# Patient Record
Sex: FEMALE | Race: BLACK OR AFRICAN AMERICAN | NOT HISPANIC OR LATINO | Employment: UNEMPLOYED | URBAN - METROPOLITAN AREA
[De-identification: names, ages, dates, MRNs, and addresses within clinical notes are randomized per-mention and may not be internally consistent; named-entity substitution may affect disease eponyms.]

---

## 2017-01-24 ENCOUNTER — HOSPITAL ENCOUNTER (EMERGENCY)
Facility: MEDICAL CENTER | Age: 57
End: 2017-01-24
Attending: EMERGENCY MEDICINE
Payer: MEDICAID

## 2017-01-24 ENCOUNTER — APPOINTMENT (OUTPATIENT)
Dept: RADIOLOGY | Facility: MEDICAL CENTER | Age: 57
End: 2017-01-24
Attending: EMERGENCY MEDICINE
Payer: MEDICAID

## 2017-01-24 VITALS
OXYGEN SATURATION: 100 % | BODY MASS INDEX: 28.81 KG/M2 | HEIGHT: 66 IN | TEMPERATURE: 97.5 F | WEIGHT: 179.23 LBS | HEART RATE: 60 BPM | SYSTOLIC BLOOD PRESSURE: 159 MMHG | RESPIRATION RATE: 20 BRPM | DIASTOLIC BLOOD PRESSURE: 75 MMHG

## 2017-01-24 DIAGNOSIS — F17.200 SMOKER: ICD-10-CM

## 2017-01-24 DIAGNOSIS — Z76.0 MEDICATION REFILL: ICD-10-CM

## 2017-01-24 DIAGNOSIS — I10 ESSENTIAL HYPERTENSION: ICD-10-CM

## 2017-01-24 LAB
ALBUMIN SERPL BCP-MCNC: 4.4 G/DL (ref 3.2–4.9)
ALBUMIN/GLOB SERPL: 1.4 G/DL
ALP SERPL-CCNC: 86 U/L (ref 30–99)
ALT SERPL-CCNC: 15 U/L (ref 2–50)
ANION GAP SERPL CALC-SCNC: 8 MMOL/L (ref 0–11.9)
APTT PPP: 31.7 SEC (ref 24.7–36)
AST SERPL-CCNC: 19 U/L (ref 12–45)
BASOPHILS # BLD AUTO: 0.4 % (ref 0–1.8)
BASOPHILS # BLD: 0.03 K/UL (ref 0–0.12)
BILIRUB SERPL-MCNC: 0.6 MG/DL (ref 0.1–1.5)
BNP SERPL-MCNC: 16 PG/ML (ref 0–100)
BUN SERPL-MCNC: 15 MG/DL (ref 8–22)
CALCIUM SERPL-MCNC: 9.6 MG/DL (ref 8.5–10.5)
CHLORIDE SERPL-SCNC: 110 MMOL/L (ref 96–112)
CO2 SERPL-SCNC: 22 MMOL/L (ref 20–33)
CREAT SERPL-MCNC: 0.9 MG/DL (ref 0.5–1.4)
EKG IMPRESSION: NORMAL
EOSINOPHIL # BLD AUTO: 0.22 K/UL (ref 0–0.51)
EOSINOPHIL NFR BLD: 2.8 % (ref 0–6.9)
ERYTHROCYTE [DISTWIDTH] IN BLOOD BY AUTOMATED COUNT: 42 FL (ref 35.9–50)
GFR SERPL CREATININE-BSD FRML MDRD: >60 ML/MIN/1.73 M 2
GLOBULIN SER CALC-MCNC: 3.1 G/DL (ref 1.9–3.5)
GLUCOSE SERPL-MCNC: 95 MG/DL (ref 65–99)
HCT VFR BLD AUTO: 43.9 % (ref 37–47)
HGB BLD-MCNC: 14.5 G/DL (ref 12–16)
IMM GRANULOCYTES # BLD AUTO: 0.02 K/UL (ref 0–0.11)
IMM GRANULOCYTES NFR BLD AUTO: 0.3 % (ref 0–0.9)
INR PPP: 0.97 (ref 0.87–1.13)
LIPASE SERPL-CCNC: 18 U/L (ref 11–82)
LYMPHOCYTES # BLD AUTO: 2.5 K/UL (ref 1–4.8)
LYMPHOCYTES NFR BLD: 31.3 % (ref 22–41)
MCH RBC QN AUTO: 30.4 PG (ref 27–33)
MCHC RBC AUTO-ENTMCNC: 33 G/DL (ref 33.6–35)
MCV RBC AUTO: 92 FL (ref 81.4–97.8)
MONOCYTES # BLD AUTO: 0.46 K/UL (ref 0–0.85)
MONOCYTES NFR BLD AUTO: 5.8 % (ref 0–13.4)
NEUTROPHILS # BLD AUTO: 4.75 K/UL (ref 2–7.15)
NEUTROPHILS NFR BLD: 59.4 % (ref 44–72)
NRBC # BLD AUTO: 0 K/UL
NRBC BLD AUTO-RTO: 0 /100 WBC
PLATELET # BLD AUTO: 213 K/UL (ref 164–446)
PMV BLD AUTO: 9.8 FL (ref 9–12.9)
POTASSIUM SERPL-SCNC: 4.3 MMOL/L (ref 3.6–5.5)
PROT SERPL-MCNC: 7.5 G/DL (ref 6–8.2)
PROTHROMBIN TIME: 13.2 SEC (ref 12–14.6)
RBC # BLD AUTO: 4.77 M/UL (ref 4.2–5.4)
SODIUM SERPL-SCNC: 140 MMOL/L (ref 135–145)
TROPONIN I SERPL-MCNC: <0.01 NG/ML (ref 0–0.04)
WBC # BLD AUTO: 8 K/UL (ref 4.8–10.8)

## 2017-01-24 PROCEDURE — 83880 ASSAY OF NATRIURETIC PEPTIDE: CPT

## 2017-01-24 PROCEDURE — 700102 HCHG RX REV CODE 250 W/ 637 OVERRIDE(OP): Performed by: EMERGENCY MEDICINE

## 2017-01-24 PROCEDURE — 71010 DX-CHEST-PORTABLE (1 VIEW): CPT

## 2017-01-24 PROCEDURE — 85730 THROMBOPLASTIN TIME PARTIAL: CPT

## 2017-01-24 PROCEDURE — 93005 ELECTROCARDIOGRAM TRACING: CPT

## 2017-01-24 PROCEDURE — 80053 COMPREHEN METABOLIC PANEL: CPT

## 2017-01-24 PROCEDURE — 36415 COLL VENOUS BLD VENIPUNCTURE: CPT

## 2017-01-24 PROCEDURE — 83690 ASSAY OF LIPASE: CPT

## 2017-01-24 PROCEDURE — 85610 PROTHROMBIN TIME: CPT

## 2017-01-24 PROCEDURE — 84484 ASSAY OF TROPONIN QUANT: CPT

## 2017-01-24 PROCEDURE — 85025 COMPLETE CBC W/AUTO DIFF WBC: CPT

## 2017-01-24 PROCEDURE — 99284 EMERGENCY DEPT VISIT MOD MDM: CPT

## 2017-01-24 PROCEDURE — A9270 NON-COVERED ITEM OR SERVICE: HCPCS | Performed by: EMERGENCY MEDICINE

## 2017-01-24 RX ORDER — BUDESONIDE AND FORMOTEROL FUMARATE DIHYDRATE 160; 4.5 UG/1; UG/1
2 AEROSOL RESPIRATORY (INHALATION) 2 TIMES DAILY
Qty: 1 INHALER | Refills: 0 | Status: SHIPPED | OUTPATIENT
Start: 2017-01-24 | End: 2017-03-23 | Stop reason: SDUPTHER

## 2017-01-24 RX ORDER — TRIAMTERENE AND HYDROCHLOROTHIAZIDE 37.5; 25 MG/1; MG/1
1 TABLET ORAL EVERY MORNING
Qty: 30 TAB | Refills: 0 | Status: SHIPPED | OUTPATIENT
Start: 2017-01-24 | End: 2017-03-23 | Stop reason: SDUPTHER

## 2017-01-24 RX ORDER — ASPIRIN 81 MG/1
324 TABLET, CHEWABLE ORAL ONCE
Status: COMPLETED | OUTPATIENT
Start: 2017-01-24 | End: 2017-01-24

## 2017-01-24 RX ADMIN — ASPIRIN 324 MG: 81 TABLET, CHEWABLE ORAL at 13:50

## 2017-01-24 ASSESSMENT — ENCOUNTER SYMPTOMS
COUGH: 0
DIZZINESS: 1
SHORTNESS OF BREATH: 0
FEVER: 0
TINGLING: 1

## 2017-01-24 ASSESSMENT — PAIN SCALES - GENERAL: PAINLEVEL_OUTOF10: 4

## 2017-01-24 NOTE — ED AVS SNAPSHOT
1/24/2017          Anali Tellez  [address]  [facility] [address]    Dear Anali:    [facility] wants to ensure your discharge home is safe and you or your loved ones have had all your questions answered regarding your care after you leave the hospital.    You may receive a telephone call within two days of your discharge.  This call is to make certain you understand your discharge instructions as well as ensure we provided you with the best care possible during your stay with us.     The call will only last approximately 3-5 minutes and will be done by a nurse.    Once again, we want to ensure your discharge home is safe and that you have a clear understanding of any next steps in your care.  If you have any questions or concerns, please do not hesitate to contact us, we are here for you.  Thank you for choosing [facility] for your healthcare needs.    Sincerely,    Qasim Chanel    [facility]

## 2017-01-24 NOTE — ED NOTES
Verbalizes understanding of discharge and followup instructions. PIV removed, VSS. Given Rx's x2. Ambulates with steady gait to discharge.

## 2017-01-24 NOTE — DISCHARGE INSTRUCTIONS
Arterial Hypertension  Arterial hypertension (high blood pressure) is a condition of elevated pressure in your blood vessels. Hypertension over a long period of time is a risk factor for strokes, heart attacks, and heart failure. It is also the leading cause of kidney (renal) failure.   CAUSES   · In Adults -- Over 90% of all hypertension has no known cause. This is called essential or primary hypertension. In the other 10% of people with hypertension, the increase in blood pressure is caused by another disorder. This is called secondary hypertension. Important causes of secondary hypertension are:  · Heavy alcohol use.  · Obstructive sleep apnea.  · Hyperaldosterosim (Conn's syndrome).  · Steroid use.  · Chronic kidney failure.  · Hyperparathyroidism.  · Medications.  · Renal artery stenosis.  · Pheochromocytoma.  · Cushing's disease.  · Coarctation of the aorta.  · Scleroderma renal crisis.  · Licorice (in excessive amounts).  · Drugs (cocaine, methamphetamine).  Your caregiver can explain any items above that apply to you.  · In Children -- Secondary hypertension is more common and should always be considered.  · Pregnancy -- Few women of childbearing age have high blood pressure. However, up to 10% of them develop hypertension of pregnancy. Generally, this will not harm the woman. It may be a sign of 3 complications of pregnancy: preeclampsia, HELLP syndrome, and eclampsia. Follow up and control with medication is necessary.  SYMPTOMS   · This condition normally does not produce any noticeable symptoms. It is usually found during a routine exam.  · Malignant hypertension is a late problem of high blood pressure. It may have the following symptoms:  · Headaches.  · Blurred vision.  · End-organ damage (this means your kidneys, heart, lungs, and other organs are being damaged).  · Stressful situations can increase the blood pressure. If a person with normal blood pressure has their blood pressure go up while being  "seen by their caregiver, this is often termed \"white coat hypertension.\" Its importance is not known. It may be related with eventually developing hypertension or complications of hypertension.  · Hypertension is often confused with mental tension, stress, and anxiety.  DIAGNOSIS   The diagnosis is made by 3 separate blood pressure measurements. They are taken at least 1 week apart from each other. If there is organ damage from hypertension, the diagnosis may be made without repeat measurements.  Hypertension is usually identified by having blood pressure readings:  · Above 140/90 mmHg measured in both arms, at 3 separate times, over a couple weeks.  · Over 130/80 mmHg should be considered a risk factor and may require treatment in patients with diabetes.  Blood pressure readings over 120/80 mmHg are called \"pre-hypertension\" even in non-diabetic patients.  To get a true blood pressure measurement, use the following guidelines. Be aware of the factors that can alter blood pressure readings.  · Take measurements at least 1 hour after caffeine.  · Take measurements 30 minutes after smoking and without any stress. This is another reason to quit smoking  it raises your blood pressure.  · Use a proper cuff size. Ask your caregiver if you are not sure about your cuff size.  · Most home blood pressure cuffs are automatic. They will measure systolic and diastolic pressures. The systolic pressure is the pressure reading at the start of sounds. Diastolic pressure is the pressure at which the sounds disappear. If you are elderly, measure pressures in multiple postures. Try sitting, lying or standing.  · Sit at rest for a minimum of 5 minutes before taking measurements.  · You should not be on any medications like decongestants. These are found in many cold medications.  · Record your blood pressure readings and review them with your caregiver.  If you have hypertension:  · Your caregiver may do tests to be sure you do not have " "secondary hypertension (see \"causes\" above).  · Your caregiver may also look for signs of metabolic syndrome. This is also called Syndrome X or Insulin Resistance Syndrome. You may have this syndrome if you have type 2 diabetes, abdominal obesity, and abnormal blood lipids in addition to hypertension.  · Your caregiver will take your medical and family history and perform a physical exam.  · Diagnostic tests may include blood tests (for glucose, cholesterol, potassium, and kidney function), a urinalysis, or an EKG. Other tests may also be necessary depending on your condition.  PREVENTION   There are important lifestyle issues that you can adopt to reduce your chance of developing hypertension:  · Maintain a normal weight.  · Limit the amount of salt (sodium) in your diet.  · Exercise often.  · Limit alcohol intake.  · Get enough potassium in your diet. Discuss specific advice with your caregiver.  · Follow a DASH diet (dietary approaches to stop hypertension). This diet is rich in fruits, vegetables, and low-fat dairy products, and avoids certain fats.  PROGNOSIS   Essential hypertension cannot be cured. Lifestyle changes and medical treatment can lower blood pressure and reduce complications. The prognosis of secondary hypertension depends on the underlying cause. Many people whose hypertension is controlled with medicine or lifestyle changes can live a normal, healthy life.   RISKS AND COMPLICATIONS   While high blood pressure alone is not an illness, it often requires treatment due to its short- and long-term effects on many organs. Hypertension increases your risk for:  · CVAs or strokes (cerebrovascular accident).  · Heart failure due to chronically high blood pressure (hypertensive cardiomyopathy).  · Heart attack (myocardial infarction).  · Damage to the retina (hypertensive retinopathy).  · Kidney failure (hypertensive nephropathy).  Your caregiver can explain list items above that apply to you. Treatment " "of hypertension can significantly reduce the risk of complications.  TREATMENT   · For overweight patients, weight loss and regular exercise are recommended. Physical fitness lowers blood pressure.  · Mild hypertension is usually treated with diet and exercise. A diet rich in fruits and vegetables, fat-free dairy products, and foods low in fat and salt (sodium) can help lower blood pressure. Decreasing salt intake decreases blood pressure in a 1/3 of people.  · Stop smoking if you are a smoker.  The steps above are highly effective in reducing blood pressure. While these actions are easy to suggest, they are difficult to achieve. Most patients with moderate or severe hypertension end up requiring medications to bring their blood pressure down to a normal level. There are several classes of medications for treatment. Blood pressure pills (antihypertensives) will lower blood pressure by their different actions. Lowering the blood pressure by 10 mmHg may decrease the risk of complications by as much as 25%.  The goal of treatment is effective blood pressure control. This will reduce your risk for complications. Your caregiver will help you determine the best treatment for you according to your lifestyle. What is excellent treatment for one person, may not be for you.  HOME CARE INSTRUCTIONS   · Do not smoke.  · Follow the lifestyle changes outlined in the \"Prevention\" section.  · If you are on medications, follow the directions carefully. Blood pressure medications must be taken as prescribed. Skipping doses reduces their benefit. It also puts you at risk for problems.  · Follow up with your caregiver, as directed.  · If you are asked to monitor your blood pressure at home, follow the guidelines in the \"Diagnosis\" section above.  SEEK MEDICAL CARE IF:   · You think you are having medication side effects.  · You have recurrent headaches or lightheadedness.  · You have swelling in your ankles.  · You have trouble with " your vision.  SEEK IMMEDIATE MEDICAL CARE IF:   · You have sudden onset of chest pain or pressure, difficulty breathing, or other symptoms of a heart attack.  · You have a severe headache.  · You have symptoms of a stroke (such as sudden weakness, difficulty speaking, difficulty walking).  MAKE SURE YOU:   · Understand these instructions.  · Will watch your condition.  · Will get help right away if you are not doing well or get worse.  Document Released: 12/18/2006 Document Revised: 03/11/2013 Document Reviewed: 07/17/2008  [employer]® Patient Information ©2014 [employer].  Chest Pain, Nonspecific  It is often hard to give a specific diagnosis for the cause of chest pain. There is always a chance that your pain could be related to something serious, like a heart attack or a blood clot in the lungs. You need to follow up with your caregiver for further evaluation. More lab tests or other studies such as X-rays, electrocardiography, stress testing, or cardiac imaging may be needed to find the cause of your pain.  Most of the time, nonspecific chest pain improves within 2 to 3 days with rest and mild pain medicine. For the next few days, avoid physical exertion or activities that bring on pain. Do not smoke. Avoid drinking alcohol. Call your caregiver for routine follow-up as advised.   SEEK IMMEDIATE MEDICAL CARE IF:  · You develop increased chest pain or pain that radiates to the arm, neck, jaw, back, or abdomen.   · You develop shortness of breath, increased coughing, or you start coughing up blood.   · You have severe back or abdominal pain, nausea, or vomiting.   · You develop severe weakness, fainting, fever, or chills.   Document Released: 12/18/2006 Document Revised: 03/11/2013 Document Reviewed: 06/06/2008  [employer]® Patient Information ©2013 [employer].    Smoking Cessation, Tips for Success  If you are ready to quit smoking, congratulations! You have chosen to help yourself be healthier. Cigarettes bring  "nicotine, tar, carbon monoxide, and other irritants into your body. Your lungs, heart, and blood vessels will be able to work better without these poisons. There are many different ways to quit smoking. Nicotine gum, nicotine patches, a nicotine inhaler, or nicotine nasal spray can help with physical craving. Hypnosis, support groups, and medicines help break the habit of smoking.  WHAT THINGS CAN I DO TO MAKE QUITTING EASIER?   Here are some tips to help you quit for good:  · Pick a date when you will quit smoking completely. Tell all of your friends and family about your plan to quit on that date.  · Do not try to slowly cut down on the number of cigarettes you are smoking. Pick a quit date and quit smoking completely starting on that day.  · Throw away all cigarettes.    · Clean and remove all ashtrays from your home, work, and car.  · On a card, write down your reasons for quitting. Carry the card with you and read it when you get the urge to smoke.  · Cleanse your body of nicotine. Drink enough water and fluids to keep your urine clear or pale yellow. Do this after quitting to flush the nicotine from your body.  · Learn to predict your moods. Do not let a bad situation be your excuse to have a cigarette. Some situations in your life might tempt you into wanting a cigarette.  · Never have \"just one\" cigarette. It leads to wanting another and another. Remind yourself of your decision to quit.  · Change habits associated with smoking. If you smoked while driving or when feeling stressed, try other activities to replace smoking. Stand up when drinking your coffee. Brush your teeth after eating. Sit in a different chair when you read the paper. Avoid alcohol while trying to quit, and try to drink fewer caffeinated beverages. Alcohol and caffeine may urge you to smoke.  · Avoid foods and drinks that can trigger a desire to smoke, such as sugary or spicy foods and alcohol.  · Ask people who smoke not to smoke around " "you.  · Have something planned to do right after eating or having a cup of coffee. For example, plan to take a walk or exercise.  · Try a relaxation exercise to calm you down and decrease your stress. Remember, you may be tense and nervous for the first 2 weeks after you quit, but this will pass.  · Find new activities to keep your hands busy. Play with a pen, coin, or rubber band. Doodle or draw things on paper.  · Brush your teeth right after eating. This will help cut down on the craving for the taste of tobacco after meals. You can also try mouthwash.    · Use oral substitutes in place of cigarettes. Try using lemon drops, carrots, cinnamon sticks, or chewing gum. Keep them handy so they are available when you have the urge to smoke.  · When you have the urge to smoke, try deep breathing.  · Designate your home as a nonsmoking area.  · If you are a heavy smoker, ask your health care provider about a prescription for nicotine chewing gum. It can ease your withdrawal from nicotine.  · Reward yourself. Set aside the cigarette money you save and buy yourself something nice.  · Look for support from others. Join a support group or smoking cessation program. Ask someone at home or at work to help you with your plan to quit smoking.  · Always ask yourself, \"Do I need this cigarette or is this just a reflex?\" Tell yourself, \"Today, I choose not to smoke,\" or \"I do not want to smoke.\" You are reminding yourself of your decision to quit.  · Do not replace cigarette smoking with electronic cigarettes (commonly called e-cigarettes). The safety of e-cigarettes is unknown, and some may contain harmful chemicals.  · If you relapse, do not give up! Plan ahead and think about what you will do the next time you get the urge to smoke.  HOW WILL I FEEL WHEN I QUIT SMOKING?  You may have symptoms of withdrawal because your body is used to nicotine (the addictive substance in cigarettes). You may crave cigarettes, be irritable, feel " very hungry, cough often, get headaches, or have difficulty concentrating. The withdrawal symptoms are only temporary. They are strongest when you first quit but will go away within 10-14 days. When withdrawal symptoms occur, stay in control. Think about your reasons for quitting. Remind yourself that these are signs that your body is healing and getting used to being without cigarettes. Remember that withdrawal symptoms are easier to treat than the major diseases that smoking can cause.   Even after the withdrawal is over, expect periodic urges to smoke. However, these cravings are generally short lived and will go away whether you smoke or not. Do not smoke!  WHAT RESOURCES ARE AVAILABLE TO HELP ME QUIT SMOKING?  Your health care provider can direct you to community resources or hospitals for support, which may include:  · Group support.  · Education.  · Hypnosis.  · Therapy.     This information is not intended to replace advice given to you by your health care provider. Make sure you discuss any questions you have with your health care provider.     Document Released: 09/15/2005 Document Revised: 01/08/2016 Document Reviewed: 06/05/2014  [employer] Interactive Patient Education ©2016 [employer]

## 2017-01-24 NOTE — ED NOTES
Pt ambulated to yellow 65.   A/ox4, has been off her bp medication for 3wks. C/o on/off chest pain and tingling left fingers.   No other deficit noted.   Changed to gown and placed monitor

## 2017-01-24 NOTE — ED PROVIDER NOTES
ED Provider Note    Scribed for Francis Cochran M.D. by Tolu Maradiaga. 1/24/2017, 1:29 PM.    Means of arrival: Walk in   History obtained from: Patient  History limited by: None    CHIEF COMPLAINT  Chief Complaint   Patient presents with   • Hypertension   • Medication Refill     off htn meds x 3 weeks.   • Tingling     left arm hand onset last night        HPI  Anali Tellez is a 56 y.o. female who presents to the Emergency Department complaining of hypertension, medication refill, tingling. Patient reports she has been off of her blood pressure medications for the past 3 weeks due to difficulty finding primary care provider and insurance issues that she is currently working out. She says she was taking Triamterene-HCTZ. She requests a medications refill. She says she came to the ED because of symptoms of chest pain, dizziness, and tingling to her left arm and hand onset last night around 10:00 PM or 11:00 PM. Currently, she has mild chest pain. Her chest pain is not exacerbated with activity. She denies any fever, shortness of breath, cough. Patient is a current smoker (Quarter pack per day). She has a past medical history of hypertension, pneumonia, and COPD.     REVIEW OF SYSTEMS  Review of Systems   Constitutional: Negative for fever.   Respiratory: Negative for cough and shortness of breath.    Cardiovascular: Positive for chest pain.   Neurological: Positive for dizziness and tingling.   All other systems reviewed and are negative.      PAST MEDICAL HISTORY   has a past medical history of Hypertension; Pneumonia; and Chronic obstructive pulmonary disease (CMS-HCC).    SURGICAL HISTORY  patient denies any surgical history    SOCIAL HISTORY  Social History   Substance Use Topics   • Smoking status: Current Every Day Smoker   • Smokeless tobacco: None   • Alcohol Use: Yes      Comment: occ      History   Drug Use No       FAMILY HISTORY  History reviewed. No pertinent family history.    CURRENT  "MEDICATIONS  Home Medications     Reviewed by Laila Johnson R.N. (Registered Nurse) on 01/24/17 at 1309  Med List Status: Complete    Medication Last Dose Status    albuterol (VENTOLIN OR PROVENTIL) 108 (90 BASE) MCG/ACT Aero Soln inhalation aerosol 1/23/2017 Active    albuterol 108 (90 BASE) MCG/ACT Aero Soln inhalation aerosol 1/23/2017 Active    budesonide-formoterol (SYMBICORT) 160-4.5 MCG/ACT Aerosol 1/23/2017 Active    triamterene-hctz (MAXZIDE-25/DYAZIDE) 37.5-25 MG Tab >3wks Active                ALLERGIES  No Known Allergies    PHYSICAL EXAM  VITAL SIGNS: /75 mmHg  Pulse 66  Temp(Src) 36.4 °C (97.5 °F)  Resp 18  Ht 1.676 m (5' 6\")  Wt 81.3 kg (179 lb 3.7 oz)  BMI 28.94 kg/m2  SpO2 99%  LMP 05/19/2010    Constitutional: Well developed, Well nourished, No acute distress, Non-toxic appearance.   HENT: Normocephalic, Atraumatic, Bilateral external ears normal, oropharynx moist, No oral exudates, Nose normal.   Eyes: Pupils are equal round and react to light, extraocular motions are intact, conjunctiva is normal, there are no signs of exudate.   Neck: Supple, no meningeal signs.  Lymphatic: No lymphadenopathy noted.   Cardiovascular: Regular rate and rhythm without murmurs gallops or rubs.   Thorax & Lungs: No respiratory distress. Breathing comfortably. Lungs are clear to auscultation bilaterally, there are no wheezes no rales. Chest wall is nontender.  Abdomen: Soft, nontender, nondistended. Bowel sounds are present.   Skin: Warm, Dry, No erythema,   Back: No tenderness, No CVA tenderness.  Musculoskeletal: Good range of motion in all major joints. No tenderness to palpation or major deformities noted. Intact distal pulses, no clubbing, no cyanosis, no edema,   Neurologic: Alert & oriented x 3, Moving all extremities. No gross abnormalities.    Psychiatric: Affect normal, Judgment normal, Mood normal.         LABS  Results for orders placed or performed during the hospital encounter of " 01/24/17   CBC WITH DIFFERENTIAL   Result Value Ref Range    WBC 8.0 4.8 - 10.8 K/uL    RBC 4.77 4.20 - 5.40 M/uL    Hemoglobin 14.5 12.0 - 16.0 g/dL    Hematocrit 43.9 37.0 - 47.0 %    MCV 92.0 81.4 - 97.8 fL    MCH 30.4 27.0 - 33.0 pg    MCHC 33.0 (L) 33.6 - 35.0 g/dL    RDW 42.0 35.9 - 50.0 fL    Platelet Count 213 164 - 446 K/uL    MPV 9.8 9.0 - 12.9 fL    Neutrophils-Polys 59.40 44.00 - 72.00 %    Lymphocytes 31.30 22.00 - 41.00 %    Monocytes 5.80 0.00 - 13.40 %    Eosinophils 2.80 0.00 - 6.90 %    Basophils 0.40 0.00 - 1.80 %    Immature Granulocytes 0.30 0.00 - 0.90 %    Nucleated RBC 0.00 /100 WBC    Neutrophils (Absolute) 4.75 2.00 - 7.15 K/uL    Lymphs (Absolute) 2.50 1.00 - 4.80 K/uL    Monos (Absolute) 0.46 0.00 - 0.85 K/uL    Eos (Absolute) 0.22 0.00 - 0.51 K/uL    Baso (Absolute) 0.03 0.00 - 0.12 K/uL    Immature Granulocytes (abs) 0.02 0.00 - 0.11 K/uL    NRBC (Absolute) 0.00 K/uL   COMP METABOLIC PANEL   Result Value Ref Range    Sodium 140 135 - 145 mmol/L    Potassium 4.3 3.6 - 5.5 mmol/L    Chloride 110 96 - 112 mmol/L    Co2 22 20 - 33 mmol/L    Anion Gap 8.0 0.0 - 11.9    Glucose 95 65 - 99 mg/dL    Bun 15 8 - 22 mg/dL    Creatinine 0.90 0.50 - 1.40 mg/dL    Calcium 9.6 8.5 - 10.5 mg/dL    AST(SGOT) 19 12 - 45 U/L    ALT(SGPT) 15 2 - 50 U/L    Alkaline Phosphatase 86 30 - 99 U/L    Total Bilirubin 0.6 0.1 - 1.5 mg/dL    Albumin 4.4 3.2 - 4.9 g/dL    Total Protein 7.5 6.0 - 8.2 g/dL    Globulin 3.1 1.9 - 3.5 g/dL    A-G Ratio 1.4 g/dL   LIPASE   Result Value Ref Range    Lipase 18 11 - 82 U/L   PROTHROMBIN TIME   Result Value Ref Range    PT 13.2 12.0 - 14.6 sec    INR 0.97 0.87 - 1.13   APTT   Result Value Ref Range    APTT 31.7 24.7 - 36.0 sec   TROPONIN   Result Value Ref Range    Troponin I <0.01 0.00 - 0.04 ng/mL   BTYPE NATRIURETIC PEPTIDE   Result Value Ref Range    B Natriuretic Peptide 16 0 - 100 pg/mL   ESTIMATED GFR   Result Value Ref Range    GFR If  >60 >60  mL/min/1.73 m 2    GFR If Non African American >60 >60 mL/min/1.73 m 2     All labs reviewed by me.    EKG  Interpreted by me    Rhythm: normal sinus  Rate: 88 which is normal  Axis: normal at 5   Ectopy: none  Conduction: Signs of left atrial enlargement. Otherwise no acute.    ST Segments: Non-specific depressions in 2, 3, and  aVF. Otherwise no acute.  T Waves: Non-specific T wave flattening throughout. Otherwise no acute.   Q Waves: none    Clinical Impression: Nonspecific EKG improved from prior EKG. No signs of acute MI.       RADIOLOGY  DX-CHEST-PORTABLE (1 VIEW)   Final Result      No evidence of acute cardiopulmonary process.        The radiologist's interpretation of all radiological studies have been reviewed by me.    COURSE & MEDICAL DECISION MAKING  Pertinent Labs & Imaging studies reviewed. (See chart for details)    1:29 PM - Patient seen and examined at bedside. Patient will be treated with ASA 324mg PO. Ordered DX-chest, CBC with differential, CMP, Lipase, Prothrombin time, APTT, Troponin, BNP, EKG to evaluate her symptoms. The differential diagnoses include but are not limited to: Hypertension, acute coronary syndrome. It has been over 16 hours since onset of patient's chest pain.      2:29 PM - Patient re-evaluated at bedside. Patient reports feeling fine. I discussed patient's lab and radiology results noted above that showed to be normal. Discussed patient's refill medications of Triamterene and Symbicort. Patient advised to return to the ED if symptoms worsen and [facility]. Patient understood and agreed.     Francis Cochran M.D. spent approximately 10 minutes with the patient explaining the importance of smoking cessation.     Decision Making:  Patient uses for evaluation. Her clinically, I do not feel is cardiovascular in nature. Her chest discomfort is been going on for greater than 16 hours and constant in nature. EKG troponin are negative. Blood  pressure slightly elevated. I'll start her back, her blood pressure medications and recommended she follow up with primary care physician 1 week for recheck, return as needed.    DISPOSITION:  Patient will be discharged home in stable condition.    FOLLOW UP:  [facility]  [address]  [place] [address]  [phone]          [facility]  [address] [facility] [address]  [phone]  Schedule an appointment as soon as possible for a visit      FINAL IMPRESSION  1. Essential hypertension    2. Medication refill    3. Smoker     3. Smoking cessation discussion with the patient 10 minutes     Tolu WHITE (Scribe), am scribing for, and in the presence of, Francis Cochran M.D..    Electronically signed by: Tolu Maradiaga (Scribe), 1/24/2017    IFrancis M.D. personally performed the services described in this documentation, as scribed by Tolu Maradiaga in my presence, and it is both accurate and complete.    The note accurately reflects work and decisions made by me.  Francis Cochran  1/24/2017  5:38 PM

## 2017-01-24 NOTE — ED NOTES
.  Chief Complaint   Patient presents with   • Hypertension   • Medication Refill     off htn meds x 3 weeks.   • Tingling     left arm hand onset last night      Ambulated to triage above c/c.   Informed of triage process. Awaiting room in triage lobby. Asked to return to triage desk with any questions or concerns.

## 2017-01-24 NOTE — ED AVS SNAPSHOT
365net Access Code: [email or web address]  Expires: 2/2/2017  8:56 AM    365net  A secure, online tool to manage your health information     [employer]’s 365net® is a secure, online tool that connects you to your personalized health information from the privacy of your home -- day or night - making it very easy for you to manage your healthcare. Once the activation process is completed, you can even access your medical information using the 365net anni, which is available for free in the Apple Anni store or Google Play store.     365net provides the following levels of access (as shown below):   My Chart Features   [facility] Primary Care Doctor [facility]  Specialists [facility]  Urgent  Care Non-[facility]  Primary Care  Doctor   Email your healthcare team securely and privately 24/7 X X X X   Manage appointments: schedule your next appointment; view details of past/upcoming appointments X      Request prescription refills. X      View recent personal medical records, including lab and immunizations X X X X   View health record, including health history, allergies, medications X X X X   Read reports about your outpatient visits, procedures, consult and ER notes X X X X   See your discharge summary, which is a recap of your hospital and/or ER visit that includes your diagnosis, lab results, and care plan. X X       How to register for 365net:  1. Go to  [email or web address].  2. Click on the Sign Up Now box, which takes you to the New Member Sign Up page. You will need to provide the following information:  a. Enter your 365net Access Code exactly as it appears at the top of this page. (You will not need to use this code after you’ve completed the sign-up process. If you do not sign up before the expiration date, you must request a new code.)   b. Enter your date of birth.   c. Enter your home email address.   d. Click Submit, and follow the next screen’s instructions.  3. Create a 365net ID. This will be your 365net  login ID and cannot be changed, so think of one that is secure and easy to remember.  4. Create a [employer] password. You can change your password at any time.  5. Enter your Password Reset Question and Answer. This can be used at a later time if you forget your password.   6. Enter your e-mail address. This allows you to receive e-mail notifications when new information is available in [employer].  7. Click Sign Up. You can now view your health information.    For assistance activating your [employer] account, call [phone]

## 2017-01-24 NOTE — ED AVS SNAPSHOT
After Visit Summary                                                                                                                Anlai Tellez   MRN: [id number]    Department:  [facility], Emergency Dept   Date of Visit:  1/24/2017            [facility], Emergency Dept    [address] [address]    Phone:  [phone]      You were seen by     Francis Cochran M.D.      Your Diagnosis Was     Essential hypertension     I10       These are the medications you received during your hospitalization from 01/24/2017 1239 to 01/24/2017 1439     Date/Time Order Dose Route Action    01/24/2017 1350 aspirin (ASA) chewable tab 324 mg 324 mg Oral Given      Follow-up Information     1. Follow up with [facility].    Contact information    [address]  [place] [address] [phone]          2. Schedule an appointment as soon as possible for a visit with [facility].    Contact information    [address] [id number] [phone]      Medication Information     Review all of your home medications and newly ordered medications with your primary doctor and/or pharmacist as soon as possible. Follow medication instructions as directed by your doctor and/or pharmacist.     Please keep your complete medication list with you and share with your physician. Update the information when medications are discontinued, doses are changed, or new medications (including over-the-counter products) are added; and carry medication information at all times in the event of emergency situations.               Medication List      CONTINUE taking these medications        Instructions    budesonide-formoterol 160-4.5 MCG/ACT Aero   Commonly known as:  SYMBICORT    Inhale 2 Puffs by mouth 2 Times a Day.   Dose:  2 Puff       triamterene-hctz 37.5-25 MG Tabs   Commonly known as:  MAXZIDE-25/DYAZIDE    Take 1 Tab by mouth every morning.   Dose:  1 Tab            ASK your doctor about these medications        Instructions    * albuterol 108 (90 BASE) MCG/ACT Aers inhalation aerosol    Inhale 2 Puffs by mouth every four hours as needed for Shortness of Breath.   Dose:  2 Puff       * albuterol 108 (90 BASE) MCG/ACT Aers inhalation aerosol    Inhale 2 Puffs by mouth every 6 hours as needed for Shortness of Breath.   Dose:  2 Puff       * Notice:  This list has 2 medication(s) that are the same as other medications prescribed for you. Read the directions carefully, and ask your doctor or other care provider to review them with you.            Procedures and tests performed during your visit     APTT    BTYPE NATRIURETIC PEPTIDE    CBC WITH DIFFERENTIAL    COMP METABOLIC PANEL    DX-CHEST-PORTABLE (1 VIEW)    EKG (NOW)    ESTIMATED GFR    LIPASE    PROTHROMBIN TIME    TROPONIN        Discharge Instructions       Arterial Hypertension  Arterial hypertension (high blood pressure) is a condition of elevated pressure in your blood vessels. Hypertension over a long period of time is a risk factor for strokes, heart attacks, and heart failure. It is also the leading cause of kidney (renal) failure.   CAUSES   · In Adults -- Over 90% of all hypertension has no known cause. This is called essential or primary hypertension. In the other 10% of people with hypertension, the increase in blood pressure is caused by another disorder. This is called secondary hypertension. Important causes of secondary hypertension are:  · Heavy alcohol use.  · Obstructive sleep apnea.  · Hyperaldosterosim (Conn's syndrome).  · Steroid use.  · Chronic kidney failure.  · Hyperparathyroidism.  · Medications.  · Renal artery stenosis.  · Pheochromocytoma.  · Cushing's disease.  · Coarctation of the aorta.  · Scleroderma renal crisis.  · Licorice (in excessive amounts).  · Drugs (cocaine, methamphetamine).  Your caregiver can explain any items above that apply to you.  · In Children -- Secondary hypertension is  "more common and should always be considered.  · Pregnancy -- Few women of childbearing age have high blood pressure. However, up to 10% of them develop hypertension of pregnancy. Generally, this will not harm the woman. It may be a sign of 3 complications of pregnancy: preeclampsia, HELLP syndrome, and eclampsia. Follow up and control with medication is necessary.  SYMPTOMS   · This condition normally does not produce any noticeable symptoms. It is usually found during a routine exam.  · Malignant hypertension is a late problem of high blood pressure. It may have the following symptoms:  · Headaches.  · Blurred vision.  · End-organ damage (this means your kidneys, heart, lungs, and other organs are being damaged).  · Stressful situations can increase the blood pressure. If a person with normal blood pressure has their blood pressure go up while being seen by their caregiver, this is often termed \"white coat hypertension.\" Its importance is not known. It may be related with eventually developing hypertension or complications of hypertension.  · Hypertension is often confused with mental tension, stress, and anxiety.  DIAGNOSIS   The diagnosis is made by 3 separate blood pressure measurements. They are taken at least 1 week apart from each other. If there is organ damage from hypertension, the diagnosis may be made without repeat measurements.  Hypertension is usually identified by having blood pressure readings:  · Above 140/90 mmHg measured in both arms, at 3 separate times, over a couple weeks.  · Over 130/80 mmHg should be considered a risk factor and may require treatment in patients with diabetes.  Blood pressure readings over 120/80 mmHg are called \"pre-hypertension\" even in non-diabetic patients.  To get a true blood pressure measurement, use the following guidelines. Be aware of the factors that can alter blood pressure readings.  · Take measurements at least 1 hour after caffeine.  · Take measurements 30 " "minutes after smoking and without any stress. This is another reason to quit smoking  it raises your blood pressure.  · Use a proper cuff size. Ask your caregiver if you are not sure about your cuff size.  · Most home blood pressure cuffs are automatic. They will measure systolic and diastolic pressures. The systolic pressure is the pressure reading at the start of sounds. Diastolic pressure is the pressure at which the sounds disappear. If you are elderly, measure pressures in multiple postures. Try sitting, lying or standing.  · Sit at rest for a minimum of 5 minutes before taking measurements.  · You should not be on any medications like decongestants. These are found in many cold medications.  · Record your blood pressure readings and review them with your caregiver.  If you have hypertension:  · Your caregiver may do tests to be sure you do not have secondary hypertension (see \"causes\" above).  · Your caregiver may also look for signs of metabolic syndrome. This is also called Syndrome X or Insulin Resistance Syndrome. You may have this syndrome if you have type 2 diabetes, abdominal obesity, and abnormal blood lipids in addition to hypertension.  · Your caregiver will take your medical and family history and perform a physical exam.  · Diagnostic tests may include blood tests (for glucose, cholesterol, potassium, and kidney function), a urinalysis, or an EKG. Other tests may also be necessary depending on your condition.  PREVENTION   There are important lifestyle issues that you can adopt to reduce your chance of developing hypertension:  · Maintain a normal weight.  · Limit the amount of salt (sodium) in your diet.  · Exercise often.  · Limit alcohol intake.  · Get enough potassium in your diet. Discuss specific advice with your caregiver.  · Follow a DASH diet (dietary approaches to stop hypertension). This diet is rich in fruits, vegetables, and low-fat dairy products, and avoids certain fats.  PROGNOSIS   "   Essential hypertension cannot be cured. Lifestyle changes and medical treatment can lower blood pressure and reduce complications. The prognosis of secondary hypertension depends on the underlying cause. Many people whose hypertension is controlled with medicine or lifestyle changes can live a normal, healthy life.   RISKS AND COMPLICATIONS   While high blood pressure alone is not an illness, it often requires treatment due to its short- and long-term effects on many organs. Hypertension increases your risk for:  · CVAs or strokes (cerebrovascular accident).  · Heart failure due to chronically high blood pressure (hypertensive cardiomyopathy).  · Heart attack (myocardial infarction).  · Damage to the retina (hypertensive retinopathy).  · Kidney failure (hypertensive nephropathy).  Your caregiver can explain list items above that apply to you. Treatment of hypertension can significantly reduce the risk of complications.  TREATMENT   · For overweight patients, weight loss and regular exercise are recommended. Physical fitness lowers blood pressure.  · Mild hypertension is usually treated with diet and exercise. A diet rich in fruits and vegetables, fat-free dairy products, and foods low in fat and salt (sodium) can help lower blood pressure. Decreasing salt intake decreases blood pressure in a 1/3 of people.  · Stop smoking if you are a smoker.  The steps above are highly effective in reducing blood pressure. While these actions are easy to suggest, they are difficult to achieve. Most patients with moderate or severe hypertension end up requiring medications to bring their blood pressure down to a normal level. There are several classes of medications for treatment. Blood pressure pills (antihypertensives) will lower blood pressure by their different actions. Lowering the blood pressure by 10 mmHg may decrease the risk of complications by as much as 25%.  The goal of treatment is effective blood pressure control.  "This will reduce your risk for complications. Your caregiver will help you determine the best treatment for you according to your lifestyle. What is excellent treatment for one person, may not be for you.  HOME CARE INSTRUCTIONS   · Do not smoke.  · Follow the lifestyle changes outlined in the \"Prevention\" section.  · If you are on medications, follow the directions carefully. Blood pressure medications must be taken as prescribed. Skipping doses reduces their benefit. It also puts you at risk for problems.  · Follow up with your caregiver, as directed.  · If you are asked to monitor your blood pressure at home, follow the guidelines in the \"Diagnosis\" section above.  SEEK MEDICAL CARE IF:   · You think you are having medication side effects.  · You have recurrent headaches or lightheadedness.  · You have swelling in your ankles.  · You have trouble with your vision.  SEEK IMMEDIATE MEDICAL CARE IF:   · You have sudden onset of chest pain or pressure, difficulty breathing, or other symptoms of a heart attack.  · You have a severe headache.  · You have symptoms of a stroke (such as sudden weakness, difficulty speaking, difficulty walking).  MAKE SURE YOU:   · Understand these instructions.  · Will watch your condition.  · Will get help right away if you are not doing well or get worse.  Document Released: 12/18/2006 Document Revised: 03/11/2013 Document Reviewed: 07/17/2008  [employer]® Patient Information ©2014 [employer].  Chest Pain, Nonspecific  It is often hard to give a specific diagnosis for the cause of chest pain. There is always a chance that your pain could be related to something serious, like a heart attack or a blood clot in the lungs. You need to follow up with your caregiver for further evaluation. More lab tests or other studies such as X-rays, electrocardiography, stress testing, or cardiac imaging may be needed to find the cause of your pain.  Most of the time, nonspecific chest pain improves " within 2 to 3 days with rest and mild pain medicine. For the next few days, avoid physical exertion or activities that bring on pain. Do not smoke. Avoid drinking alcohol. Call your caregiver for routine follow-up as advised.   SEEK IMMEDIATE MEDICAL CARE IF:  · You develop increased chest pain or pain that radiates to the arm, neck, jaw, back, or abdomen.   · You develop shortness of breath, increased coughing, or you start coughing up blood.   · You have severe back or abdominal pain, nausea, or vomiting.   · You develop severe weakness, fainting, fever, or chills.   Document Released: 12/18/2006 Document Revised: 03/11/2013 Document Reviewed: 06/06/2008  [employer]® Patient Information ©2013 [employer].    Smoking Cessation, Tips for Success  If you are ready to quit smoking, congratulations! You have chosen to help yourself be healthier. Cigarettes bring nicotine, tar, carbon monoxide, and other irritants into your body. Your lungs, heart, and blood vessels will be able to work better without these poisons. There are many different ways to quit smoking. Nicotine gum, nicotine patches, a nicotine inhaler, or nicotine nasal spray can help with physical craving. Hypnosis, support groups, and medicines help break the habit of smoking.  WHAT THINGS CAN I DO TO MAKE QUITTING EASIER?   Here are some tips to help you quit for good:  · Pick a date when you will quit smoking completely. Tell all of your friends and family about your plan to quit on that date.  · Do not try to slowly cut down on the number of cigarettes you are smoking. Pick a quit date and quit smoking completely starting on that day.  · Throw away all cigarettes.    · Clean and remove all ashtrays from your home, work, and car.  · On a card, write down your reasons for quitting. Carry the card with you and read it when you get the urge to smoke.  · Cleanse your body of nicotine. Drink enough water and fluids to keep your urine clear or pale yellow. Do  "this after quitting to flush the nicotine from your body.  · Learn to predict your moods. Do not let a bad situation be your excuse to have a cigarette. Some situations in your life might tempt you into wanting a cigarette.  · Never have \"just one\" cigarette. It leads to wanting another and another. Remind yourself of your decision to quit.  · Change habits associated with smoking. If you smoked while driving or when feeling stressed, try other activities to replace smoking. Stand up when drinking your coffee. Brush your teeth after eating. Sit in a different chair when you read the paper. Avoid alcohol while trying to quit, and try to drink fewer caffeinated beverages. Alcohol and caffeine may urge you to smoke.  · Avoid foods and drinks that can trigger a desire to smoke, such as sugary or spicy foods and alcohol.  · Ask people who smoke not to smoke around you.  · Have something planned to do right after eating or having a cup of coffee. For example, plan to take a walk or exercise.  · Try a relaxation exercise to calm you down and decrease your stress. Remember, you may be tense and nervous for the first 2 weeks after you quit, but this will pass.  · Find new activities to keep your hands busy. Play with a pen, coin, or rubber band. Doodle or draw things on paper.  · Brush your teeth right after eating. This will help cut down on the craving for the taste of tobacco after meals. You can also try mouthwash.    · Use oral substitutes in place of cigarettes. Try using lemon drops, carrots, cinnamon sticks, or chewing gum. Keep them handy so they are available when you have the urge to smoke.  · When you have the urge to smoke, try deep breathing.  · Designate your home as a nonsmoking area.  · If you are a heavy smoker, ask your health care provider about a prescription for nicotine chewing gum. It can ease your withdrawal from nicotine.  · Reward yourself. Set aside the cigarette money you save and buy yourself " "something nice.  · Look for support from others. Join a support group or smoking cessation program. Ask someone at home or at work to help you with your plan to quit smoking.  · Always ask yourself, \"Do I need this cigarette or is this just a reflex?\" Tell yourself, \"Today, I choose not to smoke,\" or \"I do not want to smoke.\" You are reminding yourself of your decision to quit.  · Do not replace cigarette smoking with electronic cigarettes (commonly called e-cigarettes). The safety of e-cigarettes is unknown, and some may contain harmful chemicals.  · If you relapse, do not give up! Plan ahead and think about what you will do the next time you get the urge to smoke.  HOW WILL I FEEL WHEN I QUIT SMOKING?  You may have symptoms of withdrawal because your body is used to nicotine (the addictive substance in cigarettes). You may crave cigarettes, be irritable, feel very hungry, cough often, get headaches, or have difficulty concentrating. The withdrawal symptoms are only temporary. They are strongest when you first quit but will go away within 10-14 days. When withdrawal symptoms occur, stay in control. Think about your reasons for quitting. Remind yourself that these are signs that your body is healing and getting used to being without cigarettes. Remember that withdrawal symptoms are easier to treat than the major diseases that smoking can cause.   Even after the withdrawal is over, expect periodic urges to smoke. However, these cravings are generally short lived and will go away whether you smoke or not. Do not smoke!  WHAT RESOURCES ARE AVAILABLE TO HELP ME QUIT SMOKING?  Your health care provider can direct you to community resources or hospitals for support, which may include:  · Group support.  · Education.  · Hypnosis.  · Therapy.     This information is not intended to replace advice given to you by your health care provider. Make sure you discuss any questions you have with your health care provider.   "   Document Released: 09/15/2005 Document Revised: 01/08/2016 Document Reviewed: 06/05/2014  [employer] Interactive Patient Education ©2016 [employer]            Patient Information     Patient Information    Following emergency treatment: all patient requiring follow-up care must return either to a private physician or a clinic if your condition worsens before you are able to obtain further medical attention, please return to the emergency room.     Billing Information    At [facility], we work to make the billing process streamlined for our patients.  Our Representatives are here to answer any questions you may have regarding your hospital bill.  If you have insurance coverage and have supplied your insurance information to us, we will submit a claim to your insurer on your behalf.  Should you have any questions regarding your bill, we can be reached online or by phone as follows:  Online: You are able pay your bills online or live chat with our representatives about any billing questions you may have. We are here to help Monday - Friday from 8:00am to 7:30pm and 9:00am - 12:00pm on Saturdays.  Please visit [email or web address]  for more information.   Phone:  [phone] or [phone]    Please note that your emergency physician, surgeon, pathologist, radiologist, anesthesiologist, and other specialists are not employed by [facility] and will therefore bill separately for their services.  Please contact them directly for any questions concerning their bills at the numbers below:     Emergency Physician Services:  [phone]  [facility]:  [phone]  [facility]:  [phone]  [facility]:  [phone]    1. Your final bill may vary from the amount quoted upon discharge if all procedures are not complete at that time, or if your doctor has additional procedures of which we are not aware. You will receive an additional bill  if you return to the Emergency Department at [facility] for suture removal regardless of the facility of which the sutures were placed.     2. Please arrange for settlement of this account at the emergency registration.    3. All self-pay accounts are due in full at the time of treatment.  If you are unable to meet this obligation then payment is expected within 4-5 days.     4. If you have had radiology studies (CT, X-ray, Ultrasound, MRI), you have received a preliminary result during your emergency department visit. Please contact the radiology department [phone] to receive a copy of your final result. Please discuss the Final result with your primary physician or with the follow up physician provided.     Crisis Hotline:  [place] Crisis Hotline:  9-532-NFORAEP or 1-784.683.5422  [place] Crisis Hotline:    1-179.748.2768 or 538-084-6464         ED Discharge Follow Up Questions    1. In order to provide you with very good care, we would like to follow up with a phone call in the next few days.  May we have your permission to contact you?     YES /  NO    2. What is the best phone number to call you? (       )_____-__________    3. What is the best time to call you?      Morning  /  Afternoon  /  Evening                   Patient Signature:  ____________________________________________________________    Date:  ____________________________________________________________

## 2017-03-23 ENCOUNTER — OFFICE VISIT (OUTPATIENT)
Dept: MEDICAL GROUP | Facility: MEDICAL CENTER | Age: 57
End: 2017-03-23
Attending: INTERNAL MEDICINE
Payer: MEDICAID

## 2017-03-23 VITALS
WEIGHT: 183 LBS | HEART RATE: 78 BPM | TEMPERATURE: 97.9 F | SYSTOLIC BLOOD PRESSURE: 162 MMHG | HEIGHT: 66 IN | DIASTOLIC BLOOD PRESSURE: 90 MMHG | RESPIRATION RATE: 16 BRPM | OXYGEN SATURATION: 98 % | BODY MASS INDEX: 29.41 KG/M2

## 2017-03-23 DIAGNOSIS — Z72.0 TOBACCO USE: ICD-10-CM

## 2017-03-23 DIAGNOSIS — I10 ESSENTIAL HYPERTENSION: ICD-10-CM

## 2017-03-23 DIAGNOSIS — G89.29 CHRONIC BILATERAL LOW BACK PAIN WITH LEFT-SIDED SCIATICA: ICD-10-CM

## 2017-03-23 DIAGNOSIS — F10.21 ALCOHOLISM IN REMISSION (HCC): ICD-10-CM

## 2017-03-23 DIAGNOSIS — Z13.9 SCREENING: ICD-10-CM

## 2017-03-23 DIAGNOSIS — M54.42 CHRONIC BILATERAL LOW BACK PAIN WITH LEFT-SIDED SCIATICA: ICD-10-CM

## 2017-03-23 DIAGNOSIS — J44.9 COPD WITHOUT EXACERBATION (HCC): ICD-10-CM

## 2017-03-23 DIAGNOSIS — Z12.11 SCREEN FOR COLON CANCER: ICD-10-CM

## 2017-03-23 PROCEDURE — 99203 OFFICE O/P NEW LOW 30 MIN: CPT | Performed by: INTERNAL MEDICINE

## 2017-03-23 PROCEDURE — 99204 OFFICE O/P NEW MOD 45 MIN: CPT | Performed by: INTERNAL MEDICINE

## 2017-03-23 RX ORDER — BUDESONIDE AND FORMOTEROL FUMARATE DIHYDRATE 160; 4.5 UG/1; UG/1
2 AEROSOL RESPIRATORY (INHALATION) 2 TIMES DAILY
Qty: 1 INHALER | Refills: 6 | Status: SHIPPED | OUTPATIENT
Start: 2017-03-23 | End: 2017-04-20 | Stop reason: CLARIF

## 2017-03-23 RX ORDER — TRIAMTERENE AND HYDROCHLOROTHIAZIDE 37.5; 25 MG/1; MG/1
1 TABLET ORAL EVERY MORNING
Qty: 30 TAB | Refills: 6 | Status: SHIPPED | OUTPATIENT
Start: 2017-03-23 | End: 2017-10-23 | Stop reason: SDUPTHER

## 2017-03-23 RX ORDER — ALBUTEROL SULFATE 2.5 MG/3ML
2.5 SOLUTION RESPIRATORY (INHALATION) EVERY 4 HOURS PRN
COMMUNITY
End: 2021-01-15 | Stop reason: SDUPTHER

## 2017-03-23 RX ORDER — NAPROXEN 500 MG/1
500 TABLET ORAL 2 TIMES DAILY WITH MEALS
Qty: 60 TAB | Refills: 1 | Status: SHIPPED | OUTPATIENT
Start: 2017-03-23 | End: 2017-07-26

## 2017-03-23 RX ORDER — TIOTROPIUM BROMIDE 18 UG/1
18 CAPSULE ORAL; RESPIRATORY (INHALATION) DAILY
Qty: 30 CAP | Refills: 6 | Status: SHIPPED | OUTPATIENT
Start: 2017-03-23 | End: 2017-09-22 | Stop reason: CLARIF

## 2017-03-23 ASSESSMENT — PATIENT HEALTH QUESTIONNAIRE - PHQ9: CLINICAL INTERPRETATION OF PHQ2 SCORE: 0

## 2017-03-23 NOTE — MR AVS SNAPSHOT
"Anali Tellez   3/23/2017 2:10 PM   Office Visit   MRN: [id number]    Department:  [facility]   Dept Phone:  [phone]    Description:  Female : 1960   Provider:  Tata Gonsalez M.D.           Reason for Visit     Establish Care meds/high bp       Allergies as of 3/23/2017     No Known Allergies      You were diagnosed with     COPD without exacerbation (CMS-[facility])   [5837175]       Essential hypertension   [9609987]       Screen for colon cancer   [667111]       Screening   [413098]         Vital Signs     Blood Pressure Pulse Temperature Respirations Height Weight    162/90 mmHg 78 36.6 °C (97.9 °F) 16 1.689 m (5' 6.5\") 83.008 kg (183 lb)    Body Mass Index Oxygen Saturation Last Menstrual Period Breastfeeding? Smoking Status       29.10 kg/m2 98% 2010 No Current Every Day Smoker       Basic Information     Date Of Birth Sex Race Ethnicity Preferred Language    1960 Female Black or  Non- English      Your appointments     Mar 23, 2017  2:10 PM   New Patient with Tata Gonsalez M.D.   [facility] ([facility]    [address] [facility] [address] [address]   [phone]           Please bring Photo ID, Insurance Cards, All Medication Bottles and copies of any legal documents (such as Living Will, Power of ) If speaking a language besides English please bring an adult . Please arrive 30 minutes prior for check in and registration. You will be receiving a confirmation call a few days before your appointment from our automated call confirmation system.            May 04, 2017  2:30 PM   Established Patient with Tata Gonsalez M.D.   [facility])    [address] [place] [address]   [phone]           You will be receiving a confirmation call a few days before your appointment from our automated call confirmation system.              Problem List              ICD-10-CM Priority Class " Noted - Resolved    COPD without exacerbation (CMS-[facility]) J44.9   11/6/2016 - Present    HTN (hypertension) I10   11/6/2016 - Present      Health Maintenance        Date Due Completion Dates    COLONOSCOPY 1960 ---    IMM DTaP/Tdap/Td Vaccine (1 - Tdap) 7/17/1978 ---    PAP SMEAR 7/17/1980 ---    MAMMOGRAM 7/17/1999 ---    IMM INFLUENZA (1) 9/1/2016 9/2/2014            Current Immunizations     Influenza Vaccine Quad Inj (Pf) 9/2/2014    Pneumococcal polysaccharide vaccine (PPSV-23) 11/6/2016  6:13 AM      Below and/or attached are the medications your provider expects you to take. Review all of your home medications and newly ordered medications with your provider and/or pharmacist. Follow medication instructions as directed by your provider and/or pharmacist. Please keep your medication list with you and share with your provider. Update the information when medications are discontinued, doses are changed, or new medications (including over-the-counter products) are added; and carry medication information at all times in the event of emergency situations     Allergies:  No Known Allergies          Medications  Valid as of: March 23, 2017 -  2:04 PM    Generic Name Brand Name Tablet Size Instructions for use    Albuterol Sulfate (Aero Soln) albuterol 108 (90 BASE) MCG/ACT Inhale 2 Puffs by mouth every four hours as needed for Shortness of Breath.        Albuterol Sulfate (Aero Soln) albuterol 108 (90 BASE) MCG/ACT Inhale 2 Puffs by mouth every 6 hours as needed for Shortness of Breath.        Albuterol Sulfate (Nebu Soln) PROVENTIL 2.5mg/3ml 2.5 mg by Nebulization route every four hours as needed for Shortness of Breath.        Budesonide-Formoterol Fumarate (Aerosol) SYMBICORT 160-4.5 MCG/ACT Inhale 2 Puffs by mouth 2 Times a Day.        Naproxen (Tab) NAPROSYN 500 MG Take 1 Tab by mouth 2 times a day, with meals.        Tiotropium Bromide Monohydrate (Cap) SPIRIVA 18 MCG Inhale 1 Cap by mouth every day.         Triamterene-HCTZ (Tab) MAXZIDE-25/DYAZIDE 37.5-25 MG Take 1 Tab by mouth every morning.        .                 Medicines prescribed today were sent to:     [facility] [address] [facility]     [facility] [place] [address]    Phone: [phone] Fax: [phone]    Open 24 Hours?: No      Medication refill instructions:       If your prescription bottle indicates you have medication refills left, it is not necessary to call your provider’s office. Please contact your pharmacy and they will refill your medication.    If your prescription bottle indicates you do not have any refills left, you may request refills at any time through one of the following ways: The online [employer] system (except Urgent Care), by calling your provider’s office, or by asking your pharmacy to contact your provider’s office with a refill request. Medication refills are processed only during regular business hours and may not be available until the next business day. Your provider may request additional information or to have a follow-up visit with you prior to refilling your medication.   *Please Note: Medication refills are assigned a new Rx number when refilled electronically. Your pharmacy may indicate that no refills were authorized even though a new prescription for the same medication is available at the pharmacy. Please request the medicine by name with the pharmacy before contacting your provider for a refill.        Your To Do List     Future Labs/Procedures Complete By Expires    HEMOGLOBIN A1C  As directed 3/24/2018    LIPID PROFILE  As directed 3/24/2018      Referral     A referral request has been sent to our patient care coordination department. Please allow 3-5 business days for us to process this request and contact you either by phone or mail. If you do not hear from us by the 5th business day, please call us at [phone].           [employer] Access Code: [email or web address]  Expires: 4/1/2017  1:25  PM    [employer]  A secure, online tool to manage your health information     [employer]’s [employer]® is a secure, online tool that connects you to your personalized health information from the privacy of your home -- day or night - making it very easy for you to manage your healthcare. Once the activation process is completed, you can even access your medical information using the [employer] anni, which is available for free in the Apple Anni store or Google Play store.     [employer] provides the following levels of access (as shown below):   My Chart Features   [facility] Doctor [facility]  Specialists [facility]  Urgent  Care Non-[facility]  Primary Care  Doctor   Email your healthcare team securely and privately 24/7 X X X    Manage appointments: schedule your next appointment; view details of past/upcoming appointments X      Request prescription refills. X      View recent personal medical records, including lab and immunizations X X X X   View health record, including health history, allergies, medications X X X X   Read reports about your outpatient visits, procedures, consult and ER notes X X X X   See your discharge summary, which is a recap of your hospital and/or ER visit that includes your diagnosis, lab results, and care plan. X X       How to register for [employer]:  1. Go to  [email or web address].  2. Click on the Sign Up Now box, which takes you to the New Member Sign Up page. You will need to provide the following information:  a. Enter your [employer] Access Code exactly as it appears at the top of this page. (You will not need to use this code after you’ve completed the sign-up process. If you do not sign up before the expiration date, you must request a new code.)   b. Enter your date of birth.   c. Enter your home email address.   d. Click Submit, and follow the next screen’s instructions.  3. Create a [employer] ID. This will be your [employer] login ID and cannot be changed, so think of one that is secure and  easy to remember.  4. Create a [employer] password. You can change your password at any time.  5. Enter your Password Reset Question and Answer. This can be used at a later time if you forget your password.   6. Enter your e-mail address. This allows you to receive e-mail notifications when new information is available in [employer].  7. Click Sign Up. You can now view your health information.    For assistance activating your [employer] account, call [phone]        Quit Tobacco Information     Do you want to quit using tobacco?    Quitting tobacco decreases risks of cancer, heart and lung disease, increases life expectancy, improves sense of taste and smell, and increases spending money, among other benefits.    If you are thinking about quitting, we can help.  • [facility] Quit Tobacco Program: 639.404.6569  o Program occurs weekly for four weeks and includes pharmacist consultation on products to support quitting smoking or chewing tobacco. A provider referral is needed for pharmacist consultation.  • Tobacco Users Help Hotline: 5-726-QUIT-NOW (320-5433) or https://[place].quitlogix.org/  o Free, confidential telephone and online coaching for [place] residents. Sessions are designed on a schedule that is convenient for you. Eligible clients receive free nicotine replacement therapy.  • Nationally: www.smokefree.gov  o Information and professional assistance to support both immediate and long-term needs as you become, and remain, a non-smoker. Smokefree.gov allows you to choose the help that best fits your needs.
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
## 2017-03-23 NOTE — ASSESSMENT & PLAN NOTE
Patient reports issues with hypertension since age 14. She has been on medication since she was 17 years old. She has been off of her blood pressure medication for about the past month due to inability to get refills. She does not have a blood pressure cuff at home but will occasionally check her blood pressure at the drug store. When she is taking her triamterene-HCTZ, she reports her blood pressures are generally in the 130s over 80s. Today in clinic blood pressure is 162/90.
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
## 2017-03-23 NOTE — ASSESSMENT & PLAN NOTE
Patient reports initially being diagnosed in the hospital with COPD in 2015 when she presented with an acute exacerbation. She has had several exacerbations since that time. During her most recent hospitalization in February, she was started on Symbicort and reports that her symptoms have improved dramatically since taking this inhaler. She still has mild shortness of breath with exertion and sometimes has wheezing at night. She has a rescue inhaler and nebulizer which she generally uses after meals. For the past 5 weeks, she finds that after she eats and feels full, she also feels short of breath. She denies choking, coughing after eating, food allergies, heartburn, abdominal pain, nausea, vomiting, diarrhea, constipation. She only feels this way at the end of the meal. If she drinks water, she does not get this sensation. It seems to happen after every meal independent of the type of food she is eating.

## 2017-03-23 NOTE — ASSESSMENT & PLAN NOTE
Patient reports she used to have a problem with heavy drinking but stopped doing this at age 28. Currently she drinks about a sixpack of beer per week and denies any trouble with alcohol.

## 2017-03-23 NOTE — ASSESSMENT & PLAN NOTE
Patient currently smokes about 5 cigarettes daily. She has been tapering down and is interested in quitting.

## 2017-04-04 ENCOUNTER — APPOINTMENT (OUTPATIENT)
Dept: OTHER | Facility: MEDICAL CENTER | Age: 57
End: 2017-04-04
Attending: INTERNAL MEDICINE
Payer: MEDICAID

## 2017-04-04 ENCOUNTER — HOSPITAL ENCOUNTER (OUTPATIENT)
Dept: LAB | Facility: MEDICAL CENTER | Age: 57
End: 2017-04-04
Attending: INTERNAL MEDICINE
Payer: MEDICAID

## 2017-04-04 DIAGNOSIS — Z13.9 SCREENING: ICD-10-CM

## 2017-04-04 LAB
CHOLEST SERPL-MCNC: 177 MG/DL (ref 100–199)
EST. AVERAGE GLUCOSE BLD GHB EST-MCNC: 117 MG/DL
HBA1C MFR BLD: 5.7 % (ref 0–5.6)
HDLC SERPL-MCNC: 88 MG/DL
LDLC SERPL CALC-MCNC: 73 MG/DL
TRIGL SERPL-MCNC: 78 MG/DL (ref 0–149)

## 2017-04-04 PROCEDURE — 83036 HEMOGLOBIN GLYCOSYLATED A1C: CPT

## 2017-04-04 PROCEDURE — 80061 LIPID PANEL: CPT

## 2017-04-04 PROCEDURE — 36415 COLL VENOUS BLD VENIPUNCTURE: CPT

## 2017-04-05 PROBLEM — R73.03 PRE-DIABETES: Status: ACTIVE | Noted: 2017-04-05

## 2017-04-13 ENCOUNTER — APPOINTMENT (OUTPATIENT)
Dept: OTHER | Facility: MEDICAL CENTER | Age: 57
End: 2017-04-13
Payer: MEDICAID

## 2017-04-18 ENCOUNTER — TELEPHONE (OUTPATIENT)
Dept: PULMONOLOGY | Facility: HOSPICE | Age: 57
End: 2017-04-18

## 2017-04-18 NOTE — TELEPHONE ENCOUNTER
1. Caller Name: pt                      Call Back Number: [phone] (home)     2. Message: Pt call, left VM stating she is coughing up sputum and would like to know if this would effect the results of the test (pft) tomorrow. Called pt back to get more details or symptoms, she wasn't home. Left message with her sister to call us back with more information    3. Patient approves office to leave a detailed voicemail/MyChart message: yes

## 2017-04-19 ENCOUNTER — APPOINTMENT (OUTPATIENT)
Dept: OTHER | Facility: MEDICAL CENTER | Age: 57
End: 2017-04-19
Attending: INTERNAL MEDICINE
Payer: MEDICAID

## 2017-04-20 ENCOUNTER — OFFICE VISIT (OUTPATIENT)
Dept: MEDICAL GROUP | Facility: MEDICAL CENTER | Age: 57
End: 2017-04-20
Attending: INTERNAL MEDICINE
Payer: MEDICAID

## 2017-04-20 VITALS
OXYGEN SATURATION: 98 % | HEIGHT: 66 IN | TEMPERATURE: 98.6 F | WEIGHT: 181.6 LBS | RESPIRATION RATE: 18 BRPM | HEART RATE: 90 BPM | DIASTOLIC BLOOD PRESSURE: 76 MMHG | BODY MASS INDEX: 29.18 KG/M2 | SYSTOLIC BLOOD PRESSURE: 140 MMHG

## 2017-04-20 DIAGNOSIS — J06.9 VIRAL URI WITH COUGH: ICD-10-CM

## 2017-04-20 DIAGNOSIS — R73.03 PRE-DIABETES: ICD-10-CM

## 2017-04-20 DIAGNOSIS — J44.9 COPD WITHOUT EXACERBATION (HCC): ICD-10-CM

## 2017-04-20 PROBLEM — R05.9 COUGH: Status: ACTIVE | Noted: 2017-04-20

## 2017-04-20 PROCEDURE — 99212 OFFICE O/P EST SF 10 MIN: CPT | Performed by: INTERNAL MEDICINE

## 2017-04-20 PROCEDURE — 99214 OFFICE O/P EST MOD 30 MIN: CPT | Performed by: INTERNAL MEDICINE

## 2017-04-20 RX ORDER — GUAIFENESIN 600 MG/1
600 TABLET, EXTENDED RELEASE ORAL EVERY 12 HOURS
Qty: 28 TAB | Refills: 0 | Status: SHIPPED | OUTPATIENT
Start: 2017-04-20 | End: 2017-07-26

## 2017-04-20 RX ORDER — BENZONATATE 100 MG/1
100 CAPSULE ORAL 3 TIMES DAILY PRN
Qty: 60 CAP | Refills: 0 | Status: SHIPPED | OUTPATIENT
Start: 2017-04-20 | End: 2017-07-26

## 2017-04-20 NOTE — ASSESSMENT & PLAN NOTE
Patient reports onset of cough 2 days ago. It was initially productive of sputum which is out of the ordinary for her. Now it has become dry and she has developed a sore throat. She denies fevers and chills. She reports slightly increased shortness of breath but denies using her rescue inhaler or nebulizer treatments more frequently. She denies any sick contacts. Feels like her cough symptoms are worst at night when she is laying down in bed.

## 2017-04-20 NOTE — MR AVS SNAPSHOT
"Anali Tellez   2017 3:10 PM   Office Visit   MRN: [id number]    Department:  [facility]   Dept Phone:  [phone]    Description:  Female : 1960   Provider:  Tata Gonsalez M.D.           Reason for Visit     Follow-Up     Cough           Allergies as of 2017     No Known Allergies      You were diagnosed with     Cough   [786.2.ICD-9-CM]         Vital Signs     Blood Pressure Pulse Temperature Respirations Height Weight    140/76 mmHg 90 37 °C (98.6 °F) 18 1.689 m (5' 6.5\") 82.373 kg (181 lb 9.6 oz)    Body Mass Index Oxygen Saturation Last Menstrual Period Breastfeeding? Smoking Status       28.88 kg/m2 98% 2010 No Current Every Day Smoker       Basic Information     Date Of Birth Sex Race Ethnicity Preferred Language    1960 Female Black or  Non- English      Your appointments     2017 10:30 AM   Pulmonary Function Test with PULMONARY FUNCTION LAB   PULMONARY LAB [facility] (--)    [address] [facility] [id number] [phone]            May 04, 2017  2:30 PM   Established Patient with Taat Gonsalez M.D.   [facility] ([facility])    [address] [place] [id number] [phone]           You will be receiving a confirmation call a few days before your appointment from our automated call confirmation system.              Problem List              ICD-10-CM Priority Class Noted - Resolved    COPD without exacerbation (CMS-[facility]) J44.9   2016 - Present    HTN (hypertension) I10   2016 - Present    Alcoholism in remission (CMS-HCC) F10.21   3/23/2017 - Present    Tobacco use Z72.0   3/23/2017 - Present    Chronic bilateral low back pain with left-sided sciatica M54.42, G89.29   3/23/2017 - Present    Pre-diabetes R73.03   2017 - Present    Cough R05   2017 - Present      Health Maintenance        Date Due Completion Dates    COLONOSCOPY 1960 ---    IMM DTaP/Tdap/Td Vaccine (1 - " Tdap) 7/17/1978 ---    PAP SMEAR 7/17/1980 ---    MAMMOGRAM 7/17/1999 ---            Current Immunizations     Influenza Vaccine Quad Inj (Pf) 9/2/2014    Pneumococcal polysaccharide vaccine (PPSV-23) 11/6/2016  6:13 AM      Below and/or attached are the medications your provider expects you to take. Review all of your home medications and newly ordered medications with your provider and/or pharmacist. Follow medication instructions as directed by your provider and/or pharmacist. Please keep your medication list with you and share with your provider. Update the information when medications are discontinued, doses are changed, or new medications (including over-the-counter products) are added; and carry medication information at all times in the event of emergency situations     Allergies:  No Known Allergies          Medications  Valid as of: April 20, 2017 -  3:41 PM    Generic Name Brand Name Tablet Size Instructions for use    Albuterol Sulfate (Aero Soln) albuterol 108 (90 BASE) MCG/ACT Inhale 2 Puffs by mouth every four hours as needed for Shortness of Breath.        Albuterol Sulfate (Aero Soln) albuterol 108 (90 BASE) MCG/ACT Inhale 2 Puffs by mouth every 6 hours as needed for Shortness of Breath.        Albuterol Sulfate (Nebu Soln) PROVENTIL 2.5mg/3ml 2.5 mg by Nebulization route every four hours as needed for Shortness of Breath.        Benzonatate (Cap) TESSALON 100 MG Take 1 Cap by mouth 3 times a day as needed for Cough.        Budesonide-Formoterol Fumarate (Aerosol) SYMBICORT 160-4.5 MCG/ACT Inhale 2 Puffs by mouth 2 Times a Day.        GuaiFENesin (TABLET SR 12 HR) MUCINEX 600 MG Take 1 Tab by mouth every 12 hours.        Naproxen (Tab) NAPROSYN 500 MG Take 1 Tab by mouth 2 times a day, with meals.        Tiotropium Bromide Monohydrate (Cap) SPIRIVA 18 MCG Inhale 1 Cap by mouth every day.        Triamterene-HCTZ (Tab) MAXZIDE-25/DYAZIDE 37.5-25 MG Take 1 Tab by mouth every morning.        .                  Medicines prescribed today were sent to:     [facility] - [place], [place] - [address]    [address] [place] [address]    Phone: [phone] Fax: [phone]    Open 24 Hours?: No      Medication refill instructions:       If your prescription bottle indicates you have medication refills left, it is not necessary to call your provider’s office. Please contact your pharmacy and they will refill your medication.    If your prescription bottle indicates you do not have any refills left, you may request refills at any time through one of the following ways: The online [employer] system (except Urgent Care), by calling your provider’s office, or by asking your pharmacy to contact your provider’s office with a refill request. Medication refills are processed only during regular business hours and may not be available until the next business day. Your provider may request additional information or to have a follow-up visit with you prior to refilling your medication.   *Please Note: Medication refills are assigned a new Rx number when refilled electronically. Your pharmacy may indicate that no refills were authorized even though a new prescription for the same medication is available at the pharmacy. Please request the medicine by name with the pharmacy before contacting your provider for a refill.           Arg[employer] Access Code: [email or web address]  Expires: 4/30/2017 10:49 AM    Arg[employer]al  A secure, online tool to manage your health information     [employer]’s Arg[employer]al® is a secure, online tool that connects you to your personalized health information from the privacy of your home -- day or night - making it very easy for you to manage your healthcare. Once the activation process is completed, you can even access your medical information using the Arg[employer]al anni, which is available for free in the Apple Anni store or Google Play store.     Arg[employer]al provides the following levels of access (as shown below):   My  Chart Features   [facility] Doctor [facility]  Specialists [facility]  Urgent  Care Non-[facility]  Primary Care  Doctor   Email your healthcare team securely and privately 24/7 X X X    Manage appointments: schedule your next appointment; view details of past/upcoming appointments X      Request prescription refills. X      View recent personal medical records, including lab and immunizations X X X X   View health record, including health history, allergies, medications X X X X   Read reports about your outpatient visits, procedures, consult and ER notes X X X X   See your discharge summary, which is a recap of your hospital and/or ER visit that includes your diagnosis, lab results, and care plan. X X       How to register for [employer]:  1. Go to  [email or web address].  2. Click on the Sign Up Now box, which takes you to the New Member Sign Up page. You will need to provide the following information:  a. Enter your [employer] Access Code exactly as it appears at the top of this page. (You will not need to use this code after you’ve completed the sign-up process. If you do not sign up before the expiration date, you must request a new code.)   b. Enter your date of birth.   c. Enter your home email address.   d. Click Submit, and follow the next screen’s instructions.  3. Create a [employer] ID. This will be your [employer] login ID and cannot be changed, so think of one that is secure and easy to remember.  4. Create a [employer] password. You can change your password at any time.  5. Enter your Password Reset Question and Answer. This can be used at a later time if you forget your password.   6. Enter your e-mail address. This allows you to receive e-mail notifications when new information is available in [employer].  7. Click Sign Up. You can now view your health information.    For assistance activating your [employer] account, call [phone]        Quit Tobacco Information     Do you want to quit using  tobacco?    Quitting tobacco decreases risks of cancer, heart and lung disease, increases life expectancy, improves sense of taste and smell, and increases spending money, among other benefits.    If you are thinking about quitting, we can help.  • [facility] Quit Tobacco Program: [phone]  o Program occurs weekly for four weeks and includes pharmacist consultation on products to support quitting smoking or chewing tobacco. A provider referral is needed for pharmacist consultation.  • Tobacco Users Help Hotline: 4-647-QUIT-NOW (128-5612) or https://[place].quitlogix.org/  o Free, confidential telephone and online coaching for [place] residents. Sessions are designed on a schedule that is convenient for you. Eligible clients receive free nicotine replacement therapy.  • Nationally: www.smokefree.gov  o Information and professional assistance to support both immediate and long-term needs as you become, and remain, a non-smoker. Smokefree.gov allows you to choose the help that best fits your needs.

## 2017-04-21 NOTE — ASSESSMENT & PLAN NOTE
Symptoms are currently well controlled with Symbicort 2 puffs twice daily and Spiriva once daily. The patient generally uses her albuterol inhaler only once daily. She has been notified that the Symbicort is not covered by insurance and need to find an alternative medication.

## 2017-04-21 NOTE — PROGRESS NOTES
Subjective:   Anali Tellez is a 56 y.o. female here today for cough ×2 days, follow-up labs    Viral URI with cough  Patient reports onset of cough 2 days ago. It was initially productive of sputum which is out of the ordinary for her. Now it has become dry and she has developed a sore throat. She denies fevers and chills. She reports slightly increased shortness of breath but denies using her rescue inhaler or nebulizer treatments more frequently. She denies any sick contacts. Feels like her cough symptoms are worst at night when she is laying down in bed.    COPD without exacerbation (CMS-[facility])  Symptoms are currently well controlled with Symbicort 2 puffs twice daily and Spiriva once daily. The patient generally uses her albuterol inhaler only once daily. She has been notified that the Symbicort is not covered by insurance and need to find an alternative medication.    Pre-diabetes  Hemoglobin A1c of 5.7 on 4/4/17         Current medicines (including changes today)  Current Outpatient Prescriptions   Medication Sig Dispense Refill   • benzonatate (TESSALON) 100 MG Cap Take 1 Cap by mouth 3 times a day as needed for Cough. 60 Cap 0   • guaifenesin LA (MUCINEX) 600 MG TABLET SR 12 HR Take 1 Tab by mouth every 12 hours. 28 Tab 0   • Mometasone Furo-Formoterol Fum 200-5 MCG/ACT Aerosol Inhale 2 Puffs by mouth 2 Times a Day. 1 Inhaler 6   • albuterol (PROVENTIL) 2.5mg/3ml Nebu Soln solution for nebulization 2.5 mg by Nebulization route every four hours as needed for Shortness of Breath.     • tiotropium (SPIRIVA) 18 MCG Cap Inhale 1 Cap by mouth every day. 30 Cap 6   • triamterene-hctz (MAXZIDE-25/DYAZIDE) 37.5-25 MG Tab Take 1 Tab by mouth every morning. 30 Tab 6   • naproxen (NAPROSYN) 500 MG Tab Take 1 Tab by mouth 2 times a day, with meals. 60 Tab 1   • albuterol 108 (90 BASE) MCG/ACT Aero Soln inhalation aerosol Inhale 2 Puffs by mouth every 6 hours as needed for Shortness of Breath. 8.5 g 3   • albuterol  "(VENTOLIN OR PROVENTIL) 108 (90 BASE) MCG/ACT Aero Soln inhalation aerosol Inhale 2 Puffs by mouth every four hours as needed for Shortness of Breath. 1 Inhaler 1     No current facility-administered medications for this visit.     She  has a past medical history of Hypertension; Chronic obstructive pulmonary disease (CMS-HCC); and Low back pain.    ROS   As above in HPI     Objective:     Blood pressure 140/76, pulse 90, temperature 37 °C (98.6 °F), resp. rate 18, height 1.689 m (5' 6.5\"), weight 82.373 kg (181 lb 9.6 oz), last menstrual period 05/19/2010, SpO2 98 %, not currently breastfeeding. Body mass index is 28.88 kg/(m^2).   Physical Exam:  Constitutional: Alert, no distress.  Skin: Warm, dry, good turgor, no rashes in visible areas.  Eye: Equal, round and reactive, conjunctiva clear, lids normal.  ENMT: Lips without lesions, fair dentition, oropharynx clear, TM's clear bilaterally, turbinates mildly swollen bilaterally.  Neck: Trachea midline, no masses, no thyromegaly. No cervical or supraclavicular lymphadenopathy  Respiratory: Unlabored respiratory effort, lungs clear to auscultation, no wheezes, no ronchi.  Cardiovascular: Normal S1, S2, no murmur, no edema.  Psych: Alert and oriented x3, normal affect and mood.    Results and Imaging:   Lab Results   Component Value Date/Time    CHOLESTEROL, 04/04/2017 01:58 PM    LDL 73 04/04/2017 01:58 PM    HDL 88 04/04/2017 01:58 PM    TRIGLYCERIDES 78 04/04/2017 01:58 PM       Lab Results   Component Value Date/Time    SODIUM 140 01/24/2017 01:35 PM    POTASSIUM 4.3 01/24/2017 01:35 PM    CHLORIDE 110 01/24/2017 01:35 PM    CO2 22 01/24/2017 01:35 PM    GLUCOSE 95 01/24/2017 01:35 PM    BUN 15 01/24/2017 01:35 PM    CREATININE 0.90 01/24/2017 01:35 PM     Lab Results   Component Value Date/Time    ALKALINE PHOSPHATASE 86 01/24/2017 01:35 PM    AST(SGOT) 19 01/24/2017 01:35 PM    ALT(SGPT) 15 01/24/2017 01:35 PM    TOTAL BILIRUBIN 0.6 01/24/2017 01:35 PM    "        Ref. Range 4/4/2017 13:58   Glycohemoglobin Latest Ref Range: 0.0-5.6 % 5.7 (H)   Estim. Avg Glu Latest Units: mg/dL 117       Assessment and Plan:   The following treatment plan was discussed    1. Viral URI with cough  Today duration of symptoms. History and physical exam most consistent with a viral upper respiratory infection.  Discussed supportive therapy with patient. Prescription for Tessalon Perles and Mucinex given today. Patient will contact us if no improvement in symptoms over the next week.  -Tessalon Perles 100 mg 3 times a day when necessary  -Mucinex 600 mg twice a day when necessary    2. COPD without exacerbation (CMS-HCC)  Stable, well controlled with current medications. Symbicort not covered by insurance. Will switch to Dulera  -cont spiriva daily  -switch to Dulera 200/5 2 puff BID    3. Pre-diabetes  Hemoglobin A1c of 5.7 on most recent labs. Discussed diet and exercise with patient.    HCM:  Patient continues to refuse mammogram and colon cancer screening  Scheduled for Pap at her convenience      Followup: Return in about 2 weeks (around 5/4/2017) for PAP.

## 2017-04-28 ENCOUNTER — APPOINTMENT (OUTPATIENT)
Dept: OTHER | Facility: MEDICAL CENTER | Age: 57
End: 2017-04-28
Attending: INTERNAL MEDICINE
Payer: MEDICAID

## 2017-05-26 DIAGNOSIS — J44.9 COPD WITHOUT EXACERBATION (HCC): ICD-10-CM

## 2017-07-26 ENCOUNTER — OFFICE VISIT (OUTPATIENT)
Dept: MEDICAL GROUP | Facility: MEDICAL CENTER | Age: 57
End: 2017-07-26
Attending: INTERNAL MEDICINE
Payer: MEDICAID

## 2017-07-26 VITALS
OXYGEN SATURATION: 99 % | RESPIRATION RATE: 16 BRPM | BODY MASS INDEX: 29.09 KG/M2 | HEIGHT: 66 IN | HEART RATE: 88 BPM | TEMPERATURE: 97.1 F | WEIGHT: 181 LBS | SYSTOLIC BLOOD PRESSURE: 128 MMHG | DIASTOLIC BLOOD PRESSURE: 74 MMHG

## 2017-07-26 DIAGNOSIS — R21 RASH: ICD-10-CM

## 2017-07-26 DIAGNOSIS — Z72.0 TOBACCO USE: ICD-10-CM

## 2017-07-26 DIAGNOSIS — G89.29 CHRONIC BILATERAL LOW BACK PAIN WITH LEFT-SIDED SCIATICA: ICD-10-CM

## 2017-07-26 DIAGNOSIS — J44.9 COPD WITHOUT EXACERBATION (HCC): ICD-10-CM

## 2017-07-26 DIAGNOSIS — M54.42 CHRONIC BILATERAL LOW BACK PAIN WITH LEFT-SIDED SCIATICA: ICD-10-CM

## 2017-07-26 PROBLEM — J06.9 VIRAL URI WITH COUGH: Status: RESOLVED | Noted: 2017-04-20 | Resolved: 2017-07-26

## 2017-07-26 PROCEDURE — 99214 OFFICE O/P EST MOD 30 MIN: CPT | Performed by: INTERNAL MEDICINE

## 2017-07-26 PROCEDURE — 99213 OFFICE O/P EST LOW 20 MIN: CPT | Performed by: INTERNAL MEDICINE

## 2017-07-26 RX ORDER — METHOCARBAMOL 500 MG/1
TABLET, FILM COATED ORAL
Qty: 30 TAB | Refills: 0 | Status: SHIPPED | OUTPATIENT
Start: 2017-07-26 | End: 2017-08-11 | Stop reason: SDUPTHER

## 2017-07-26 RX ORDER — BUDESONIDE AND FORMOTEROL FUMARATE DIHYDRATE 160; 4.5 UG/1; UG/1
2 AEROSOL RESPIRATORY (INHALATION) 2 TIMES DAILY
Qty: 1 INHALER | Refills: 11 | Status: SHIPPED | OUTPATIENT
Start: 2017-07-26 | End: 2017-11-22 | Stop reason: SDUPTHER

## 2017-07-26 ASSESSMENT — PAIN SCALES - GENERAL: PAINLEVEL: 4=SLIGHT-MODERATE PAIN

## 2017-07-26 NOTE — MR AVS SNAPSHOT
"Anali Tellez   2017 4:50 PM   Office Visit   MRN: [id number]    Department:  Healthcare Center   Dept Phone:  [phone]    Description:  Female : 1960   Provider:  Tata Gonsalez M.D.           Allergies as of 2017     No Known Allergies      You were diagnosed with     Rash   [[id number]]       COPD without exacerbation (CMS-HCC)   [5929905]       Chronic bilateral low back pain with left-sided sciatica   [[id number]]         Vital Signs     Blood Pressure Pulse Temperature Respirations Height Weight    128/74 mmHg 88 36.2 °C (97.1 °F) 16 1.689 m (5' 6.5\") 82.101 kg (181 lb)    Body Mass Index Oxygen Saturation Last Menstrual Period Breastfeeding? Smoking Status       28.78 kg/m2 99% 2010 No Current Every Day Smoker       Basic Information     Date Of Birth Sex Race Ethnicity Preferred Language    1960 Female Black or  Non- English      Your appointments     Aug 31, 2017 11:30 AM   Established Patient with Tata Gonsalez M.D.   [facility] ([facility])    [address] [place] [id number]   [phone]           You will be receiving a confirmation call a few days before your appointment from our automated call confirmation system.              Problem List              ICD-10-CM Priority Class Noted - Resolved    COPD without exacerbation (CMS-HCC) J44.9   2016 - Present    HTN (hypertension) I10   2016 - Present    Alcoholism in remission (CMS-HCC) F10.21   3/23/2017 - Present    Tobacco use Z72.0   3/23/2017 - Present    Chronic bilateral low back pain with left-sided sciatica M54.42, G89.29   3/23/2017 - Present    Pre-diabetes R73.03   2017 - Present    Rash R21   2017 - Present      Health Maintenance        Date Due Completion Dates    COLONOSCOPY 1960 ---    IMM DTaP/Tdap/Td Vaccine (1 - Tdap) 1978 ---    PAP SMEAR 1980 ---    MAMMOGRAM 2018 (Originally 1999) ---    IMM " INFLUENZA (1) 9/1/2017 9/2/2014            Current Immunizations     Influenza Vaccine Quad Inj (Pf) 9/2/2014    Pneumococcal polysaccharide vaccine (PPSV-23) 11/6/2016  6:13 AM      Below and/or attached are the medications your provider expects you to take. Review all of your home medications and newly ordered medications with your provider and/or pharmacist. Follow medication instructions as directed by your provider and/or pharmacist. Please keep your medication list with you and share with your provider. Update the information when medications are discontinued, doses are changed, or new medications (including over-the-counter products) are added; and carry medication information at all times in the event of emergency situations     Allergies:  No Known Allergies          Medications  Valid as of: July 26, 2017 -  5:06 PM    Generic Name Brand Name Tablet Size Instructions for use    Albuterol Sulfate (Aero Soln) albuterol 108 (90 BASE) MCG/ACT Inhale 2 Puffs by mouth every four hours as needed for Shortness of Breath.        Albuterol Sulfate (Aero Soln) albuterol 108 (90 BASE) MCG/ACT Inhale 2 Puffs by mouth every 6 hours as needed for Shortness of Breath.        Albuterol Sulfate (Nebu Soln) PROVENTIL 2.5mg/3ml 2.5 mg by Nebulization route every four hours as needed for Shortness of Breath.        Budesonide-Formoterol Fumarate (Aerosol) SYMBICORT 160-4.5 MCG/ACT Inhale 2 Puffs by mouth 2 Times a Day.        Hydrocortisone (Cream) hydrocortisone 2.5 % Apply to rash on arms twice a day as needed        Methocarbamol (Tab) ROBAXIN 500 MG Take 1-2 tabs up to TID PRN muscle spasm        Tiotropium Bromide Monohydrate (Cap) SPIRIVA 18 MCG Inhale 1 Cap by mouth every day.        Triamterene-HCTZ (Tab) MAXZIDE-25/DYAZIDE 37.5-25 MG Take 1 Tab by mouth every morning.        .                 Medicines prescribed today were sent to:     [facility] - [place] - [address]    [address] [facility] [place]  [address]    Phone: [phone] Fax: [phone]    Open 24 Hours?: No      Medication refill instructions:       If your prescription bottle indicates you have medication refills left, it is not necessary to call your provider’s office. Please contact your pharmacy and they will refill your medication.    If your prescription bottle indicates you do not have any refills left, you may request refills at any time through one of the following ways: The online [employer] system (except Urgent Care), by calling your provider’s office, or by asking your pharmacy to contact your provider’s office with a refill request. Medication refills are processed only during regular business hours and may not be available until the next business day. Your provider may request additional information or to have a follow-up visit with you prior to refilling your medication.   *Please Note: Medication refills are assigned a new Rx number when refilled electronically. Your pharmacy may indicate that no refills were authorized even though a new prescription for the same medication is available at the pharmacy. Please request the medicine by name with the pharmacy before contacting your provider for a refill.        Your To Do List     Future Labs/Procedures Complete By Expires    DX-LUMBAR SPINE-4+ VIEWS  As directed 7/26/2018      Referral     A referral request has been sent to our patient care coordination department. Please allow 3-5 business days for us to process this request and contact you either by phone or mail. If you do not hear from us by the 5th business day, please call us at [phone].           [employer] Access Code: [email or web address]  Expires: 8/25/2017  5:06 PM    [employer]  A secure, online tool to manage your health information     [employer]’s [employer]® is a secure, online tool that connects you to your personalized health information from the privacy of your home -- day or night - making it very easy for you to manage  your healthcare. Once the activation process is completed, you can even access your medical information using the [facility] anni, which is available for free in the Apple Anni store or Google Play store.     [facility] provides the following levels of access (as shown below):   My Chart Features   [facility] Doctor [facility]  Specialists [facility]  Urgent  Care Non-[facility]  Primary Care  Doctor   Email your healthcare team securely and privately 24/7 X X X    Manage appointments: schedule your next appointment; view details of past/upcoming appointments X      Request prescription refills. X      View recent personal medical records, including lab and immunizations X X X X   View health record, including health history, allergies, medications X X X X   Read reports about your outpatient visits, procedures, consult and ER notes X X X X   See your discharge summary, which is a recap of your hospital and/or ER visit that includes your diagnosis, lab results, and care plan. X X       How to register for [facility]:  1. Go to  [email or web address].  2. Click on the Sign Up Now box, which takes you to the New Member Sign Up page. You will need to provide the following information:  a. Enter your [facility] Access Code exactly as it appears at the top of this page. (You will not need to use this code after you’ve completed the sign-up process. If you do not sign up before the expiration date, you must request a new code.)   b. Enter your date of birth.   c. Enter your home email address.   d. Click Submit, and follow the next screen’s instructions.  3. Create a [facility] ID. This will be your [facility] login ID and cannot be changed, so think of one that is secure and easy to remember.  4. Create a [facility] password. You can change your password at any time.  5. Enter your Password Reset Question and Answer. This can be used at a later time if you forget your password.   6. Enter your e-mail address. This allows you to receive  e-mail notifications when new information is available in [employer].  7. Click Sign Up. You can now view your health information.    For assistance activating your [employer] account, call [phone]        Quit Tobacco Information     Do you want to quit using tobacco?    Quitting tobacco decreases risks of cancer, heart and lung disease, increases life expectancy, improves sense of taste and smell, and increases spending money, among other benefits.    If you are thinking about quitting, we can help.  • [facility] Quit Tobacco Program: 715.575.5183  o Program occurs weekly for four weeks and includes pharmacist consultation on products to support quitting smoking or chewing tobacco. A provider referral is needed for pharmacist consultation.  • Tobacco Users Help Hotline: 7-247-QUIT-NOW (643-9433) or https://[place].quitlogix.org/  o Free, confidential telephone and online coaching for [place] residents. Sessions are designed on a schedule that is convenient for you. Eligible clients receive free nicotine replacement therapy.  • Nationally: www.smokefree.gov  o Information and professional assistance to support both immediate and long-term needs as you become, and remain, a non-smoker. Smokefree.gov allows you to choose the help that best fits your needs.

## 2017-07-26 NOTE — ASSESSMENT & PLAN NOTE
Patient reports an itchy raised rash on both of her arms and her neck that started several months ago at the time she switched from her Symbicort to Breo Ellipta.  She denies any other changes in medications, contact with new cleaning products or chemicals in the environment, changes in her soaps, lotions, or laundry detergent. She has been applying some over-the-counter hydrocortisone to the rash which helps a little bit but it has not resolved. It is not painful and she denies fevers, chills, pus draining from the rash.

## 2017-07-26 NOTE — ASSESSMENT & PLAN NOTE
At last visit, patient was started on Breo ellipta due to formulary restrictions. However, she broke out in a rash shortly after starting this medication and thinks it may be related. She continues to have shortness of breath that is worse when she eats and she tends to take her inhaler following this, either her Spiriva or her Breo. I had ordered pulmonary function testing at her last visit because her symptoms are somewhat atypical, but she has not had this done yet. She continues to smoke daily.
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
## 2017-07-27 NOTE — ASSESSMENT & PLAN NOTE
Patient reports she is trying to cut back but is having some difficulty. She used a nicotine patch when she was in the hospital and states that this curbed her cravings. She is not quite ready to quit at this point in time and does not want a prescription for nicotine patches.

## 2017-08-11 ENCOUNTER — HOSPITAL ENCOUNTER (OUTPATIENT)
Dept: RADIOLOGY | Facility: MEDICAL CENTER | Age: 57
End: 2017-08-11
Attending: INTERNAL MEDICINE
Payer: MEDICAID

## 2017-08-11 ENCOUNTER — HOSPITAL ENCOUNTER (OUTPATIENT)
Dept: OTHER | Facility: MEDICAL CENTER | Age: 57
End: 2017-08-11
Attending: INTERNAL MEDICINE
Payer: MEDICAID

## 2017-08-11 DIAGNOSIS — J44.9 COPD WITHOUT EXACERBATION (HCC): ICD-10-CM

## 2017-08-11 DIAGNOSIS — M54.42 CHRONIC BILATERAL LOW BACK PAIN WITH LEFT-SIDED SCIATICA: ICD-10-CM

## 2017-08-11 DIAGNOSIS — G89.29 CHRONIC BILATERAL LOW BACK PAIN WITH LEFT-SIDED SCIATICA: ICD-10-CM

## 2017-08-11 PROBLEM — M47.816 LUMBAR FACET ARTHROPATHY: Status: ACTIVE | Noted: 2017-08-11

## 2017-08-11 PROCEDURE — 94060 EVALUATION OF WHEEZING: CPT | Mod: 26 | Performed by: INTERNAL MEDICINE

## 2017-08-11 PROCEDURE — 94060 EVALUATION OF WHEEZING: CPT

## 2017-08-11 PROCEDURE — 72110 X-RAY EXAM L-2 SPINE 4/>VWS: CPT

## 2017-08-11 ASSESSMENT — PULMONARY FUNCTION TESTS
FEV1_PERCENT_CHANGE: 6
FEV1_PERCENT_PREDICTED: 110
FEV1/FVC_PERCENT_PREDICTED: 96
FVC_PERCENT_PREDICTED: 122
FVC_PREDICTED: 3.01
FVC_PERCENT_PREDICTED: 115
FEV1: 2.64
FVC: 3.48
FEV1: 2.8
FEV1_PERCENT_PREDICTED: 117
FEV1/FVC_PERCENT_CHANGE: 100
FEV1/FVC_PERCENT_PREDICTED: 79
FEV1/FVC: 76
FVC: 3.7
FEV1/FVC: 75.68
FEV1_PREDICTED: 2.39
FEV1/FVC_PERCENT_PREDICTED: 96
FEV1_PERCENT_CHANGE: 6

## 2017-08-14 NOTE — PROCEDURES
DATE OF SERVICE:  08/11/2017    Baseline spirometry demonstrates a normal FEV1 at 2.64 liters, which is 110%   predicted.  FEV1/FVC ratio was normal at 76%.  MVV is normal at 122% of   predicted.    After the administration of an inhaled bronchodilator, there is a 6%   improvement in FEV1, which is not statistically significant.    Flow volume loop was normal.    IMPRESSION:  No evidence of any significant obstructive or restrictive lung   disease is noted on this study.       ____________________________________     MD MANNY SERRANO / [facility]    DD:  08/14/2017 10:07:09  DT:  08/14/2017 10:13:53    D#:  [id number]  Job#:  [id number]    cc: Tata Gonsalez MD

## 2017-08-15 ENCOUNTER — TELEPHONE (OUTPATIENT)
Dept: MEDICAL GROUP | Facility: MEDICAL CENTER | Age: 57
End: 2017-08-15

## 2017-08-15 RX ORDER — METHOCARBAMOL 500 MG/1
TABLET, FILM COATED ORAL
Qty: 90 TAB | Refills: 3 | Status: SHIPPED | OUTPATIENT
Start: 2017-08-15 | End: 2018-02-16 | Stop reason: SDUPTHER

## 2017-08-15 NOTE — TELEPHONE ENCOUNTER
----- Message from Tata Gonsalez M.D. sent at 8/11/2017 12:40 PM PDT -----  Please inform patient her x ray does show some worsening of the arthritis in her spine when compared to previous x rays in the system from 2010.  The arthritis is affecting the disks between the vertebrae as well as the small joints in the spine called facet joints.  At this point, physical therapy anti-inflammatories as we are doing is the best treatment.  We can discuss any questions she has at her next visit.    Tata Gonsalez M.D.

## 2017-08-31 ENCOUNTER — OFFICE VISIT (OUTPATIENT)
Dept: MEDICAL GROUP | Facility: MEDICAL CENTER | Age: 57
End: 2017-08-31
Attending: INTERNAL MEDICINE
Payer: MEDICAID

## 2017-08-31 VITALS
HEART RATE: 88 BPM | TEMPERATURE: 98.6 F | OXYGEN SATURATION: 98 % | BODY MASS INDEX: 28.41 KG/M2 | HEIGHT: 67 IN | WEIGHT: 181 LBS | DIASTOLIC BLOOD PRESSURE: 78 MMHG | SYSTOLIC BLOOD PRESSURE: 124 MMHG | RESPIRATION RATE: 16 BRPM

## 2017-08-31 DIAGNOSIS — M54.42 CHRONIC BILATERAL LOW BACK PAIN WITH LEFT-SIDED SCIATICA: ICD-10-CM

## 2017-08-31 DIAGNOSIS — Z72.0 TOBACCO USE: ICD-10-CM

## 2017-08-31 DIAGNOSIS — Z12.11 SCREEN FOR COLON CANCER: ICD-10-CM

## 2017-08-31 DIAGNOSIS — G89.29 CHRONIC BILATERAL LOW BACK PAIN WITH LEFT-SIDED SCIATICA: ICD-10-CM

## 2017-08-31 DIAGNOSIS — M47.816 LUMBAR FACET ARTHROPATHY: ICD-10-CM

## 2017-08-31 DIAGNOSIS — R21 RASH: ICD-10-CM

## 2017-08-31 PROCEDURE — 99214 OFFICE O/P EST MOD 30 MIN: CPT | Performed by: INTERNAL MEDICINE

## 2017-08-31 PROCEDURE — 99213 OFFICE O/P EST LOW 20 MIN: CPT | Performed by: INTERNAL MEDICINE

## 2017-08-31 RX ORDER — TRIAMCINOLONE ACETONIDE 1 MG/G
CREAM TOPICAL
Qty: 1 TUBE | Refills: 0 | Status: SHIPPED | OUTPATIENT
Start: 2017-08-31 | End: 2018-03-09 | Stop reason: SDUPTHER

## 2017-08-31 NOTE — ASSESSMENT & PLAN NOTE
Patient continues to smoke daily. She does not feel she is quite ready to quit at this point. We discussed nicotine patches but she declined.

## 2017-08-31 NOTE — ASSESSMENT & PLAN NOTE
Patient continues to complain of raised papular itchy rash over her right arm. It only extends up to her elbow. She states that the hydrocortisone cream that we tried at her last visit was not helpful. She has noticed it spreads to her hairline but nowhere else. Her left arm is relatively unaffected.

## 2017-08-31 NOTE — PROGRESS NOTES
Subjective:   Anali Tellez is a 57 y.o. female here today for Follow-up back pain, rash    Rash  Patient continues to complain of raised papular itchy rash over her right arm. It only extends up to her elbow. She states that the hydrocortisone cream that we tried at her last visit was not helpful. She has noticed it spreads to her hairline but nowhere else. Her left arm is relatively unaffected.    Chronic bilateral low back pain with left-sided sciatica  Patient reports that her back pain improved significantly with the Robaxin. She did physical therapy for her back in 2011 but has not done any recently.    Tobacco use  Patient continues to smoke daily. She does not feel she is quite ready to quit at this point. We discussed nicotine patches but she declined.       Current medicines (including changes today)  Current Outpatient Prescriptions   Medication Sig Dispense Refill   • triamcinolone acetonide (KENALOG) 0.1 % Cream Apply to rash on arm twice daily 1 Tube 0   • methocarbamol (ROBAXIN) 500 MG Tab TAKE 1 TO 2 TABLETS ORALLY 3 TIMES DAILY IF NEEDED FOR MUSCLE SPASM 90 Tab 3   • budesonide-formoterol (SYMBICORT) 160-4.5 MCG/ACT Aerosol Inhale 2 Puffs by mouth 2 Times a Day. 1 Inhaler 11   • albuterol (PROVENTIL) 2.5mg/3ml Nebu Soln solution for nebulization 2.5 mg by Nebulization route every four hours as needed for Shortness of Breath.     • tiotropium (SPIRIVA) 18 MCG Cap Inhale 1 Cap by mouth every day. 30 Cap 6   • triamterene-hctz (MAXZIDE-25/DYAZIDE) 37.5-25 MG Tab Take 1 Tab by mouth every morning. 30 Tab 6   • albuterol 108 (90 BASE) MCG/ACT Aero Soln inhalation aerosol Inhale 2 Puffs by mouth every 6 hours as needed for Shortness of Breath. 8.5 g 3   • albuterol (VENTOLIN OR PROVENTIL) 108 (90 BASE) MCG/ACT Aero Soln inhalation aerosol Inhale 2 Puffs by mouth every four hours as needed for Shortness of Breath. 1 Inhaler 1     No current facility-administered medications for this visit.      She  " has a past medical history of Chronic obstructive pulmonary disease (CMS-HCC); Hypertension; and Low back pain.    ROS   As above in HPI     Objective:     Blood pressure 124/78, pulse 88, temperature 37 °C (98.6 °F), resp. rate 16, height 1.689 m (5' 6.5\"), weight 82.1 kg (181 lb), last menstrual period 05/19/2010, SpO2 98 %. Body mass index is 28.78 kg/m².   Physical Exam:  Constitutional: Alert, no distress.  Skin: Warm, dry, good turgor, papular rash over right forearm with excoriation, several small papules at the base of the hairline bilaterally.  Eye: Equal, round and reactive, conjunctiva clear, lids normal.  Psych: Alert and oriented x3, normal affect and mood.    Results and Imaging:   L spine X ray (8/11/17):    FINDINGS:  Mild right convex scoliosis of the lumbar spine.  Lumbar vertebral body heights preserved.  Mild L3-4 through L5-S1 degenerative disc disease mildly increased when compared to previous examination. Moderate L3-4 through L5-S1 facet joint arthritic changes increased since previous examination.    No instability on lateral flexion and extension views.  No dislocation.  The SI joints are mildly sclerotic.   Impression       Mildly increased L3-4 through L5-S1 degenerative disc disease and facet joint arthritic changes.  No instability on lateral flexion and extension views.         Assessment and Plan:   The following treatment plan was discussed    1. Rash  No improvement with low potency topical corticosteroid, however does appear to be an allergic type reaction. We will increase topical steroid to a medium potency.  I have also placed a referral to dermatology in case this does not work for further evaluation.  - REFERRAL TO DERMATOLOGY  - triamcinolone acetonide (KENALOG) 0.1 % Cream; Apply to rash on arm twice daily  Dispense: 1 Tube; Refill: 0    2. Lumbar facet arthropathy (HCC)  Improved with Robaxin. Patient will continue. We will also get her set up with physical therapy as it " has been about 6 years since she has done any to help with strengthening of the core and back muscles and injury prevention.  - REFERRAL TO PHYSICAL THERAPY Reason for Therapy: Eval/Treat/Report    3. Chronic bilateral low back pain with left-sided sciatica  Improved with Robaxin. Patient will continue. We will also get her set up with physical therapy as it has been about 6 years since she has done any to help with strengthening of the core and back muscles and injury prevention.  -robaxin 500 mg TID PRN  - REFERRAL TO PHYSICAL THERAPY Reason for Therapy: Eval/Treat/Report    4. Tobacco use  Continues to smoke daily. Not interested in quitting at this time. We will continue to encourage cessation.    5. Screen for colon cancer  - OCCULT BLOOD FECES IMMUNOASSAY; Future      Followup: Return in about 6 months (around 2/28/2018), or if symptoms worsen or fail to improve.

## 2017-08-31 NOTE — ASSESSMENT & PLAN NOTE
Patient reports that her back pain improved significantly with the Robaxin. She did physical therapy for her back in 2011 but has not done any recently.

## 2017-09-07 ENCOUNTER — TELEPHONE (OUTPATIENT)
Dept: MEDICAL GROUP | Facility: MEDICAL CENTER | Age: 57
End: 2017-09-07

## 2017-09-07 ENCOUNTER — OFFICE VISIT (OUTPATIENT)
Dept: MEDICAL GROUP | Facility: MEDICAL CENTER | Age: 57
End: 2017-09-07
Attending: INTERNAL MEDICINE
Payer: MEDICAID

## 2017-09-07 VITALS
BODY MASS INDEX: 29.09 KG/M2 | HEIGHT: 66 IN | OXYGEN SATURATION: 98 % | SYSTOLIC BLOOD PRESSURE: 140 MMHG | TEMPERATURE: 97.1 F | DIASTOLIC BLOOD PRESSURE: 88 MMHG | WEIGHT: 181 LBS | HEART RATE: 90 BPM | RESPIRATION RATE: 16 BRPM

## 2017-09-07 DIAGNOSIS — G89.29 CHRONIC BILATERAL LOW BACK PAIN WITH LEFT-SIDED SCIATICA: ICD-10-CM

## 2017-09-07 DIAGNOSIS — M54.42 CHRONIC BILATERAL LOW BACK PAIN WITH LEFT-SIDED SCIATICA: ICD-10-CM

## 2017-09-07 PROCEDURE — 99214 OFFICE O/P EST MOD 30 MIN: CPT | Performed by: INTERNAL MEDICINE

## 2017-09-07 PROCEDURE — 99213 OFFICE O/P EST LOW 20 MIN: CPT | Performed by: INTERNAL MEDICINE

## 2017-09-07 RX ORDER — MELOXICAM 15 MG/1
15 TABLET ORAL DAILY
Qty: 30 TAB | Refills: 1 | Status: SHIPPED | OUTPATIENT
Start: 2017-09-07 | End: 2017-12-08 | Stop reason: SDUPTHER

## 2017-09-07 ASSESSMENT — PAIN SCALES - GENERAL: PAINLEVEL: 5=MODERATE PAIN

## 2017-09-07 NOTE — TELEPHONE ENCOUNTER
[facility] called in, states the pts referral will need to be re-routed. States they do not see anyone with any type of medicaid plan over the age of 18 yrs old. FYI please advise. I will forward into to referral team as well.

## 2017-09-07 NOTE — ASSESSMENT & PLAN NOTE
"Patient reports that work yesterday \"throwing her back out\". Since that time, she has had worsening shooting pain down the back for legs worse on the left than the right. She denies any weakness in her legs. States the pain feels like burning. It is happening mostly at night when she is asleep. She has been taking the Robaxin twice daily which helps a lot with her muscle spasms, however she is still having significant pain. She is not taking any Tylenol or ibuprofen over-the-counter. She brings in a request from the HR department at the [employer] where she works as a , requesting a description of her limitations, medical diagnoses, and requesting for any accommodations that would make her job more sustainable. I have completed this paperwork for her today.  "

## 2017-09-08 NOTE — PROGRESS NOTES
"Subjective:   Anali Tellez is a 57 y.o. female here today for Follow-up back pain, paperwork    Chronic bilateral low back pain with left-sided sciatica  Patient reports that work yesterday \"throwing her back out\". Since that time, she has had worsening shooting pain down the back for legs worse on the left than the right. She denies any weakness in her legs. States the pain feels like burning. It is happening mostly at night when she is asleep. She has been taking the Robaxin twice daily which helps a lot with her muscle spasms, however she is still having significant pain. She is not taking any Tylenol or ibuprofen over-the-counter. She brings in a request from the HR department at the [employer] where she works as a , requesting a description of her limitations, medical diagnoses, and requesting for any accommodations that would make her job more sustainable. I have completed this paperwork for her today.       Current medicines (including changes today)  Current Outpatient Prescriptions   Medication Sig Dispense Refill   • meloxicam (MOBIC) 15 MG tablet Take 1 Tab by mouth every day. 30 Tab 1   • triamcinolone acetonide (KENALOG) 0.1 % Cream Apply to rash on arm twice daily 1 Tube 0   • methocarbamol (ROBAXIN) 500 MG Tab TAKE 1 TO 2 TABLETS ORALLY 3 TIMES DAILY IF NEEDED FOR MUSCLE SPASM 90 Tab 3   • budesonide-formoterol (SYMBICORT) 160-4.5 MCG/ACT Aerosol Inhale 2 Puffs by mouth 2 Times a Day. 1 Inhaler 11   • albuterol (PROVENTIL) 2.5mg/3ml Nebu Soln solution for nebulization 2.5 mg by Nebulization route every four hours as needed for Shortness of Breath.     • tiotropium (SPIRIVA) 18 MCG Cap Inhale 1 Cap by mouth every day. 30 Cap 6   • triamterene-hctz (MAXZIDE-25/DYAZIDE) 37.5-25 MG Tab Take 1 Tab by mouth every morning. 30 Tab 6   • albuterol 108 (90 BASE) MCG/ACT Aero Soln inhalation aerosol Inhale 2 Puffs by mouth every 6 hours as needed for Shortness of Breath. 8.5 g 3   • albuterol " "(VENTOLIN OR PROVENTIL) 108 (90 BASE) MCG/ACT Aero Soln inhalation aerosol Inhale 2 Puffs by mouth every four hours as needed for Shortness of Breath. 1 Inhaler 1     No current facility-administered medications for this visit.      She  has a past medical history of Chronic obstructive pulmonary disease (CMS-[facility]); Hypertension; and Low back pain.    ROS   As above in HPI     Objective:     Blood pressure 140/88, pulse 90, temperature 36.2 °C (97.1 °F), resp. rate 16, height 1.689 m (5' 6.5\"), weight 82.1 kg (181 lb), last menstrual period 05/19/2010, SpO2 98 %, not currently breastfeeding. Body mass index is 28.78 kg/m².   Physical Exam:  Constitutional: Alert, no distress.  Skin: Warm, dry, good turgor, no rashes in visible areas.  Eye: Equal, round and reactive, conjunctiva clear, lids normal.  MSK: Tenderness to palpation over lumbar spine and bilateral lumbar paraspinal muscles  Psych: Alert and oriented x3, normal affect and mood.      Assessment and Plan:   The following treatment plan was discussed    1. Chronic bilateral low back pain with left-sided sciatica  Acute flare of chronic pain related to \"overdoing it\" at work. I have prescribed her meloxicam to take for the next 14 days to help decrease the inflammation. She can continue her Robaxin as needed. I completed the paperwork she gave me for her work detailing her condition and limitations. She will follow up with physical therapy as was ordered at last visit. If symptoms continue to persist, would consider obtaining an MRI and possibly starting her on gabapentin if neuropathic component of pain is persistent.  - start meloxicam (MOBIC) 15 MG tablet; Take 1 Tab by mouth every day.  Dispense: 30 Tab; Take daily for the next 2 weeks  -Robaxin 500 mg 3 times a day when necessary  -Follow up with physical therapy  -Paperwork requesting work accommodation completed today    Followup: Return if symptoms worsen or fail to improve.         "

## 2017-09-22 ENCOUNTER — TELEPHONE (OUTPATIENT)
Dept: MEDICAL GROUP | Facility: MEDICAL CENTER | Age: 57
End: 2017-09-22

## 2017-09-22 NOTE — TELEPHONE ENCOUNTER
Received notification from pharmacy that Pt's spiriva would not be covered and would need a prior auth. Continue with prior auth or change Rx.? Please advise

## 2017-09-27 ENCOUNTER — OFFICE VISIT (OUTPATIENT)
Dept: MEDICAL GROUP | Facility: MEDICAL CENTER | Age: 57
End: 2017-09-27
Attending: NURSE PRACTITIONER
Payer: MEDICAID

## 2017-09-27 VITALS
DIASTOLIC BLOOD PRESSURE: 78 MMHG | SYSTOLIC BLOOD PRESSURE: 114 MMHG | HEART RATE: 92 BPM | BODY MASS INDEX: 28.25 KG/M2 | OXYGEN SATURATION: 98 % | WEIGHT: 180 LBS | TEMPERATURE: 97.9 F | HEIGHT: 67 IN | RESPIRATION RATE: 16 BRPM

## 2017-09-27 DIAGNOSIS — G89.29 CHRONIC BILATERAL LOW BACK PAIN WITH LEFT-SIDED SCIATICA: ICD-10-CM

## 2017-09-27 DIAGNOSIS — M54.42 CHRONIC BILATERAL LOW BACK PAIN WITH LEFT-SIDED SCIATICA: ICD-10-CM

## 2017-09-27 PROCEDURE — 99213 OFFICE O/P EST LOW 20 MIN: CPT | Performed by: NURSE PRACTITIONER

## 2017-09-27 PROCEDURE — 99212 OFFICE O/P EST SF 10 MIN: CPT | Performed by: NURSE PRACTITIONER

## 2017-09-27 RX ORDER — GABAPENTIN 300 MG/1
300 CAPSULE ORAL
Qty: 30 CAP | Refills: 0 | Status: SHIPPED | OUTPATIENT
Start: 2017-09-27 | End: 2017-11-01 | Stop reason: SDUPTHER

## 2017-09-27 ASSESSMENT — PAIN SCALES - GENERAL: PAINLEVEL: NO PAIN

## 2017-09-27 NOTE — ASSESSMENT & PLAN NOTE
"She has chronic low back pain with left sided sciatica and was started on Meloxicam and methocarbamol at her last appt. She states these medications are helpful for her during the day but the pain keeps her up at night. She states the pain is mostly in her legs and she describes it as \"heavy\" but she also reports sharp pains in her legs. She denies any saddles paraesthesia, bowel or bladder incontinence.    She picked up some paperwork done for unemployment by her PCP today and is not satisfied with the responses. It states that she can work as a  with modification but the patient argues that there are no available modifications. She is required to be able to lift and bend. She states that she has also applied for disability. We had a discussion in regards to this and I explained to her that she should seek out vocational rehabilitation through [employer] to be trained to do other less physical work.  "

## 2017-09-27 NOTE — PROGRESS NOTES
"Subjective:     Chief Complaint   Patient presents with   • Leg Pain     bilat.\"Only at night.It's like somebody sitting on my legs\"     Anali Tellez is a 57 y.o. female here today for leg pain. She is a patient of my colleague Dr. Gonsalez who is out of the clinic.      Chronic bilateral low back pain with left-sided sciatica  She has chronic low back pain with left sided sciatica and was started on Meloxicam and methocarbamol at her last appt. She states these medications are helpful for her during the day but the pain keeps her up at night. She states the pain is mostly in her legs and she describes it as \"heavy\" but she also reports sharp pains in her legs. She denies any saddles paraesthesia, bowel or bladder incontinence.    She picked up some paperwork done for unemployment by her PCP today and is not satisfied with the responses. It states that she can work as a  with modification but the patient argues that there are no available modifications. She is required to be able to lift and bend. She states that she has also applied for disability. We had a discussion in regards to this and I explained to her that she should seek out vocational rehabilitation through [employer] to be trained to do other less physical work.           Current medicines (including changes today)  Current Outpatient Prescriptions   Medication Sig Dispense Refill   • gabapentin (NEURONTIN) 300 MG Cap Take 1 Cap by mouth every bedtime. 30 Cap 0   • Tiotropium Bromide Monohydrate (SPIRIVA RESPIMAT) 2.5 MCG/ACT Aero Soln Inhale 2 Puffs by mouth every day. 1 Inhaler 5   • meloxicam (MOBIC) 15 MG tablet Take 1 Tab by mouth every day. 30 Tab 1   • triamcinolone acetonide (KENALOG) 0.1 % Cream Apply to rash on arm twice daily 1 Tube 0   • methocarbamol (ROBAXIN) 500 MG Tab TAKE 1 TO 2 TABLETS ORALLY 3 TIMES DAILY IF NEEDED FOR MUSCLE SPASM 90 Tab 3   • budesonide-formoterol (SYMBICORT) 160-4.5 MCG/ACT Aerosol Inhale " "2 Puffs by mouth 2 Times a Day. 1 Inhaler 11   • albuterol (PROVENTIL) 2.5mg/3ml Nebu Soln solution for nebulization 2.5 mg by Nebulization route every four hours as needed for Shortness of Breath.     • triamterene-hctz (MAXZIDE-25/DYAZIDE) 37.5-25 MG Tab Take 1 Tab by mouth every morning. 30 Tab 6   • albuterol 108 (90 BASE) MCG/ACT Aero Soln inhalation aerosol Inhale 2 Puffs by mouth every 6 hours as needed for Shortness of Breath. 8.5 g 3   • albuterol (VENTOLIN OR PROVENTIL) 108 (90 BASE) MCG/ACT Aero Soln inhalation aerosol Inhale 2 Puffs by mouth every four hours as needed for Shortness of Breath. 1 Inhaler 1     No current facility-administered medications for this visit.      She  has a past medical history of Chronic obstructive pulmonary disease (CMS-[facility]); Hypertension; and Low back pain.      Current medications, allergies and problems list reviewed and updated in EPIC.        ROS  Review of systems as documented above in history of present illness.         Objective:     Blood pressure 114/78, pulse 92, temperature 36.6 °C (97.9 °F), resp. rate 16, height 1.689 m (5' 6.5\"), weight 81.6 kg (180 lb), last menstrual period 05/19/2010, SpO2 98 %, not currently breastfeeding. Body mass index is 28.62 kg/m².     Physical Exam:  Alert, oriented in no acute distress.  Eye contact is good, speech goal directed, affect calm  HEENT: conjunctiva non-injected, sclera non-icteric.  Neck: Supple.  Lungs: clear to auscultation bilaterally with good excursion.  CV: regular rate and rhythm.  Abdomen: soft  Ext: no edema  Skin: no rashes or lesions in visible areas.  MSK:  Normal gait. She does appear uncomfortable sitting in the exam chair and shifted her weight or stood up. No swelling or deformity noted in lumbar spine. Positive paraspinal muscle tenderness noted on exam      Assessment and Plan:   The following treatment plan was discussed     1. Chronic bilateral low back pain with left-sided sciatica  Her pain is " not controlled, particularly at night when she is experiencing pain and a heavy sensation in her legs.  Will start on gabapentin 300 mg nightly. May continue on meloxicam and methocarbamol as needed during the day.    As noted above, had a long discussion with her in regards to vocational rehabilitation and training as I doubt she will be deemed disabled by the Social Security Administration. She voiced understanding of this and will look into it.    gabapentin (NEURONTIN) 300 MG Cap  Discussed dosing/side effects. Pt instructed to call clinic with any adverse effects           Followup: Return if symptoms worsen or fail to improve.          Please note that this dictation was created using voice recognition software. Every reasonable attempt has been made to correct obvious errors, however there may be errors of grammar and possibly content that were not discovered before finalizing the note.

## 2017-10-04 ENCOUNTER — TELEPHONE (OUTPATIENT)
Dept: MEDICAL GROUP | Facility: MEDICAL CENTER | Age: 57
End: 2017-10-04

## 2017-10-04 NOTE — TELEPHONE ENCOUNTER
We received a call today from the  at [employer] regarding this patient. She had brought by paperwork today to be completed regarding her ability to continue to work as a . Due to her chronic back pain, she has difficulty performing the tasks required by her job, and has been off of work for the past month. I recommended that she return to work with light duty and modified activities that included left bending and lifting, however, the  told me that this was not possible because it would be unfair to the other employees who are doing full duty. I then be completed the paperwork to state that since light duty is not an option for this patient that she should find a different type of work.  When she initially brought in paperwork for me to complete, there was a list of 80 jobs that the [employer] offered attached. The  at [facility] [facility] [employer] asked me to determine whether the patient was capable of filling any of those positions. I let him know that I was not comfortable doing this given my lack of knowledge about what these positions entailed and the fact that I'm not an occupational health doctor. I recommended that the patient be seen by an occupational health specialist regarding a full assessment of what she can and cannot do at work. I told him that I was not comfortable stating that any of the 80 plus jobs submitted were not possible for the patient. He had originally stated that the purpose of this phone call was to get my statement in writing that the patient could not do any of the jobs that they offered. Again I told him I could not do this as is possible given my training and my assessment of the patient.    Tata Gonsalez M.D.

## 2017-10-23 DIAGNOSIS — I10 ESSENTIAL HYPERTENSION: ICD-10-CM

## 2017-10-25 RX ORDER — TRIAMTERENE AND HYDROCHLOROTHIAZIDE 75; 50 MG/1; MG/1
TABLET ORAL
Qty: 30 TAB | Refills: 5 | Status: SHIPPED | OUTPATIENT
Start: 2017-10-25 | End: 2018-04-24 | Stop reason: SDUPTHER

## 2017-11-01 DIAGNOSIS — G89.29 CHRONIC BILATERAL LOW BACK PAIN WITH LEFT-SIDED SCIATICA: ICD-10-CM

## 2017-11-01 DIAGNOSIS — M54.42 CHRONIC BILATERAL LOW BACK PAIN WITH LEFT-SIDED SCIATICA: ICD-10-CM

## 2017-11-02 RX ORDER — GABAPENTIN 300 MG/1
300 CAPSULE ORAL
Qty: 30 CAP | Refills: 3 | Status: SHIPPED | OUTPATIENT
Start: 2017-11-02 | End: 2018-03-23 | Stop reason: SDUPTHER

## 2017-11-22 DIAGNOSIS — J44.9 COPD WITHOUT EXACERBATION (HCC): ICD-10-CM

## 2017-11-22 RX ORDER — BUDESONIDE AND FORMOTEROL FUMARATE DIHYDRATE 160; 4.5 UG/1; UG/1
AEROSOL RESPIRATORY (INHALATION)
Qty: 1 INHALER | Refills: 2 | Status: SHIPPED | OUTPATIENT
Start: 2017-11-22 | End: 2018-02-08 | Stop reason: SDUPTHER

## 2017-12-08 DIAGNOSIS — M54.42 CHRONIC BILATERAL LOW BACK PAIN WITH LEFT-SIDED SCIATICA: ICD-10-CM

## 2017-12-08 DIAGNOSIS — G89.29 CHRONIC BILATERAL LOW BACK PAIN WITH LEFT-SIDED SCIATICA: ICD-10-CM

## 2017-12-11 RX ORDER — MELOXICAM 15 MG/1
15 TABLET ORAL
Qty: 30 TAB | Refills: 3 | Status: SHIPPED | OUTPATIENT
Start: 2017-12-11 | End: 2018-04-24 | Stop reason: SDUPTHER

## 2018-02-08 ENCOUNTER — OFFICE VISIT (OUTPATIENT)
Dept: MEDICAL GROUP | Facility: MEDICAL CENTER | Age: 58
End: 2018-02-08
Attending: INTERNAL MEDICINE
Payer: MEDICAID

## 2018-02-08 VITALS
WEIGHT: 194 LBS | HEIGHT: 66 IN | HEART RATE: 90 BPM | BODY MASS INDEX: 31.18 KG/M2 | OXYGEN SATURATION: 98 % | SYSTOLIC BLOOD PRESSURE: 128 MMHG | DIASTOLIC BLOOD PRESSURE: 80 MMHG | TEMPERATURE: 97.9 F | RESPIRATION RATE: 16 BRPM

## 2018-02-08 DIAGNOSIS — R06.02 SHORTNESS OF BREATH: ICD-10-CM

## 2018-02-08 DIAGNOSIS — E66.9 OBESITY (BMI 30-39.9): ICD-10-CM

## 2018-02-08 DIAGNOSIS — J44.9 COPD WITHOUT EXACERBATION (HCC): ICD-10-CM

## 2018-02-08 DIAGNOSIS — R09.89 CHOKING EPISODE: ICD-10-CM

## 2018-02-08 PROCEDURE — 99214 OFFICE O/P EST MOD 30 MIN: CPT | Performed by: INTERNAL MEDICINE

## 2018-02-08 PROCEDURE — 99213 OFFICE O/P EST LOW 20 MIN: CPT | Performed by: INTERNAL MEDICINE

## 2018-02-08 RX ORDER — RANITIDINE 150 MG/1
150 TABLET ORAL 2 TIMES DAILY
Qty: 60 TAB | Refills: 0 | Status: SHIPPED | OUTPATIENT
Start: 2018-02-08 | End: 2018-03-09

## 2018-02-08 RX ORDER — BUDESONIDE AND FORMOTEROL FUMARATE DIHYDRATE 160; 4.5 UG/1; UG/1
AEROSOL RESPIRATORY (INHALATION)
Qty: 1 INHALER | Refills: 2 | Status: SHIPPED | OUTPATIENT
Start: 2018-02-08 | End: 2018-03-28

## 2018-02-08 ASSESSMENT — PAIN SCALES - GENERAL: PAINLEVEL: NO PAIN

## 2018-02-08 NOTE — ASSESSMENT & PLAN NOTE
Patient reports sudden onset of shortness of breath and difficulty breathing that occurs regularly around 8 or 9 PM every night. She is with her sister today who helps provide some of the history. Her sister states that they usually eat dinner about half an hour before that time, and that the shortness of breath seems to be preceded by a coughing event. Patient also reports coughing and sometimes feeling like she is choking when she eats. Sometimes when the shortness of breath happens, she feels like her throat makes a weird noise although she denies any phlegm or cough. Usually, she will run and grab her Symbicort and take 2 puffs of the inhaler after which she feels better. The shortness of breath is generally accompanied by a hot flash. Patient denies any associated anxiety. Symptoms last for brief seconds to minutes and then resolve. They always occur about 20 minutes after eating

## 2018-02-09 NOTE — PROGRESS NOTES
Subjective:   Anali Tellez is a 57 y.o. female here today for shortness of breath, paperwork     Shortness of breath  Patient reports sudden onset of shortness of breath and difficulty breathing that occurs regularly around 8 or 9 PM every night. She is with her sister today who helps provide some of the history. Her sister states that they usually eat dinner about half an hour before that time, and that the shortness of breath seems to be preceded by a coughing event. Patient also reports coughing and sometimes feeling like she is choking when she eats. Sometimes when the shortness of breath happens, she feels like her throat makes a weird noise although she denies any phlegm or cough. Usually, she will run and grab her Symbicort and take 2 puffs of the inhaler after which she feels better. The shortness of breath is generally accompanied by a hot flash. Patient denies any associated anxiety. Symptoms last for brief seconds to minutes and then resolve. They always occur about 20 minutes after eating    COPD without exacerbation (CMS-[facility])  Patient does have a long history of tobacco use. She had PFTs done in August 2017 which were essentially normal. She states that without her Spiriva, her breathing is very difficult during the day. She has been using the Symbicort is more a rescue inhaler, taking 2 puffs at night when she feels short of breath. She states that she will occasionally use the albuterol. She denies dyspnea on exertion. Her shortness of breath is mostly related to eating as discussed extensively below.     Patient brings in paperwork from her disability  which she asks me to complete today. See scanned into media.    Current medicines (including changes today)  Current Outpatient Prescriptions   Medication Sig Dispense Refill   • raNITidine (ZANTAC) 150 MG Tab Take 1 Tab by mouth 2 times a day. 60 Tab 0   • budesonide-formoterol (SYMBICORT) 160-4.5 MCG/ACT Aerosol INHALE 2 PUFFS ORALLY 2  "TIMES DAILY 1 Inhaler 2   • meloxicam (MOBIC) 15 MG tablet Take 1 Tab by mouth 1 time daily as needed for Moderate Pain. 30 Tab 3   • gabapentin (NEURONTIN) 300 MG Cap Take 1 Cap by mouth every bedtime. 30 Cap 3   • triamterene-hydrochlorothiazide (MAXZIDE 75-50) 75-50 MG Tab TAKE 1 TABLET ORALLY EVERY MORNING 30 Tab 5   • Tiotropium Bromide Monohydrate (SPIRIVA RESPIMAT) 2.5 MCG/ACT Aero Soln Inhale 2 Puffs by mouth every day. 1 Inhaler 5   • triamcinolone acetonide (KENALOG) 0.1 % Cream Apply to rash on arm twice daily 1 Tube 0   • methocarbamol (ROBAXIN) 500 MG Tab TAKE 1 TO 2 TABLETS ORALLY 3 TIMES DAILY IF NEEDED FOR MUSCLE SPASM 90 Tab 3   • albuterol (PROVENTIL) 2.5mg/3ml Nebu Soln solution for nebulization 2.5 mg by Nebulization route every four hours as needed for Shortness of Breath.     • albuterol 108 (90 BASE) MCG/ACT Aero Soln inhalation aerosol Inhale 2 Puffs by mouth every 6 hours as needed for Shortness of Breath. 8.5 g 3   • albuterol (VENTOLIN OR PROVENTIL) 108 (90 BASE) MCG/ACT Aero Soln inhalation aerosol Inhale 2 Puffs by mouth every four hours as needed for Shortness of Breath. 1 Inhaler 1     No current facility-administered medications for this visit.      She  has a past medical history of Chronic obstructive pulmonary disease (CMS-formerly [facility]); Hypertension; and Low back pain.    ROS   As above in HPI     Objective:     Blood pressure 128/80, pulse 90, temperature 36.6 °C (97.9 °F), resp. rate 16, height 1.689 m (5' 6.5\"), weight 88 kg (194 lb), last menstrual period 05/19/2010, SpO2 98 %, not currently breastfeeding. Body mass index is 30.85 kg/m².   Physical Exam:  Constitutional: Alert, no distress.  Skin: Warm, dry, good turgor, no rashes in visible areas.  Eye: Equal, round and reactive, conjunctiva clear, lids normal.  Respiratory: Unlabored respiratory effort, lungs clear to auscultation, no wheezes, no ronchi.  Psych: Alert and oriented x3, normal affect and mood.      Assessment and Plan: "   The following treatment plan was discussed    1. Shortness of breath  Unclear etiology. Does not sound like classic COPD to me. Given its relationship to eating, I question whether she is having slight aspiration events. I also question whether she demonstrating an atypical presentation of GERD. Differential would also include vocal cord spasm. We will start with a swallow eval as well as empiric therapy with ranitidine twice daily for the next 4 weeks and she will follow-up in clinic after that.  -Start ranitidine 150 mg twice a day ×4 weeks  - CLINICAL SWALLOW EVALUATION  -Follow-up in 4 weeks    2. COPD without exacerbation (CMS-HCC)  Unclear whether patient has significant COPD although she does seem to do better when on Spiriva. We discussed that she could continue this and at the Symbicort is not meant to be used as needed but rather as a daily maintenance inhaler. We clarified today that the albuterol is to be used as a rescue inhaler. Patient expressed understanding. Symbicort refilled today.  - budesonide-formoterol (SYMBICORT) 160-4.5 MCG/ACT Aerosol; INHALE 2 PUFFS ORALLY 2 TIMES DAILY  Dispense: 1 Inhaler; Refill: 2    3. Obesity (BMI 30-39.9)  - Patient identified as having weight management issue.  Appropriate orders and counseling given.    4. Choking episode  We need to rule out aspiration events given patient's shortness of breath and coughing related to meals. Therefore I have ordered a swallow eval.  - CLINICAL SWALLOW EVALUATION    Paperwork: I did complete the patient's paperwork for her  today. I listed all of her medical diagnoses. I did state that she has more difficulty with prolonged sitting, standing, or walking, but that she is still able to do these things. I also discussed that we do not have ways to test whether she should continue some of the more detailed tasks or strength related tasks. I stated that I do believe she can go back to work and does not qualify for  disability at this time.    Followup: Return in about 4 weeks (around 3/8/2018), or if symptoms worsen or fail to improve, for shortness of breath.

## 2018-02-09 NOTE — ASSESSMENT & PLAN NOTE
Patient does have a long history of tobacco use. She had PFTs done in August 2017 which were essentially normal. She states that without her Spiriva, her breathing is very difficult during the day. She has been using the Symbicort is more a rescue inhaler, taking 2 puffs at night when she feels short of breath. She states that she will occasionally use the albuterol. She denies dyspnea on exertion. Her shortness of breath is mostly related to eating as discussed extensively below.

## 2018-02-16 RX ORDER — METHOCARBAMOL 500 MG/1
TABLET, FILM COATED ORAL
Qty: 90 TAB | Refills: 2 | Status: SHIPPED | OUTPATIENT
Start: 2018-02-16 | End: 2018-07-05 | Stop reason: SDUPTHER

## 2018-02-28 RX ORDER — TIOTROPIUM BROMIDE INHALATION SPRAY 3.12 UG/1
SPRAY, METERED RESPIRATORY (INHALATION)
Qty: 4 G | Refills: 4 | Status: SHIPPED | OUTPATIENT
Start: 2018-02-28 | End: 2018-09-11

## 2018-03-09 ENCOUNTER — OFFICE VISIT (OUTPATIENT)
Dept: MEDICAL GROUP | Facility: MEDICAL CENTER | Age: 58
End: 2018-03-09
Attending: INTERNAL MEDICINE
Payer: MEDICAID

## 2018-03-09 VITALS
TEMPERATURE: 97.9 F | HEIGHT: 66 IN | BODY MASS INDEX: 31.82 KG/M2 | DIASTOLIC BLOOD PRESSURE: 84 MMHG | WEIGHT: 198 LBS | SYSTOLIC BLOOD PRESSURE: 130 MMHG | HEART RATE: 96 BPM | OXYGEN SATURATION: 97 % | RESPIRATION RATE: 16 BRPM

## 2018-03-09 DIAGNOSIS — M47.816 LUMBAR FACET ARTHROPATHY: ICD-10-CM

## 2018-03-09 DIAGNOSIS — M54.42 CHRONIC BILATERAL LOW BACK PAIN WITH LEFT-SIDED SCIATICA: ICD-10-CM

## 2018-03-09 DIAGNOSIS — G89.29 CHRONIC BILATERAL LOW BACK PAIN WITH LEFT-SIDED SCIATICA: ICD-10-CM

## 2018-03-09 DIAGNOSIS — L30.8 OTHER ECZEMA: ICD-10-CM

## 2018-03-09 DIAGNOSIS — R06.02 SHORTNESS OF BREATH: ICD-10-CM

## 2018-03-09 PROBLEM — L30.9 ECZEMA: Status: ACTIVE | Noted: 2018-03-09

## 2018-03-09 PROCEDURE — 99214 OFFICE O/P EST MOD 30 MIN: CPT | Performed by: INTERNAL MEDICINE

## 2018-03-09 PROCEDURE — 99212 OFFICE O/P EST SF 10 MIN: CPT | Performed by: INTERNAL MEDICINE

## 2018-03-09 RX ORDER — TRIAMCINOLONE ACETONIDE 1 MG/G
CREAM TOPICAL
Qty: 45 G | Refills: 3 | Status: SHIPPED | OUTPATIENT
Start: 2018-03-09 | End: 2018-05-03 | Stop reason: SDUPTHER

## 2018-03-09 ASSESSMENT — PAIN SCALES - GENERAL: PAINLEVEL: 4=SLIGHT-MODERATE PAIN

## 2018-03-09 NOTE — ASSESSMENT & PLAN NOTE
Patient reports continued daily low back pain. She was given a physical therapy referral back in August but never followed up with it. She is interested in doing this now. She would also like a referral to pain management. Her current pain regimen includes meloxicam and Robaxin and she states when she stops these medications she has significantly worsened pain. She would ultimately like to be off of medications and back in the work force. She does describe pain shooting down the side of her left leg. She denies weakness in her legs, loss of bowel or bladder function, numbness or tingling.

## 2018-03-09 NOTE — PROGRESS NOTES
Subjective:   Anali Tellez is a 57 y.o. female here today for follow-up shortness of breath, low back pain, rash on hands    Chronic bilateral low back pain with left-sided sciatica  Patient reports continued daily low back pain. She was given a physical therapy referral back in August but never followed up with it. She is interested in doing this now. She would also like a referral to pain management. Her current pain regimen includes meloxicam and Robaxin and she states when she stops these medications she has significantly worsened pain. She would ultimately like to be off of medications and back in the work force. She does describe pain shooting down the side of her left leg. She denies weakness in her legs, loss of bowel or bladder function, numbness or tingling.    Eczema  Patient reports a flare of dyshidrotic eczema on her hands. She did use the Kenalog cream last time this happened with full resolution of the rash. She states that it is itchy and slightly painful in the areas where the skin has cracked. She ran out of the Kenalog cream so hasn't been able to use it again. She denies a rash on any other places outside of her hands. She wears gloves when she washes dishes and she tries to minimize the number of times she has to wash her hands during the day.    Shortness of breath  Patient has noticed that not only does her shortness of breath, and side with right after eating but also if she brings her knees up to the level of her chest when she washes in the shower or tries to tie her shoe. If she avoids this activity she does not feel it. She is still using her Symbicort [place] as needed inhaler instead of daily inhaler despite education to the contrary. She has not been able to have the clinical swallow evaluation done. She reports that she has been taking the ranitidine for about a month and hasn't noticed any improvement in her symptoms. She again denies heartburn.       Current medicines  "(including changes today)  Current Outpatient Prescriptions   Medication Sig Dispense Refill   • triamcinolone acetonide (KENALOG) 0.1 % Cream Apply to rash on arm twice daily 45 g 3   • SPIRIVA RESPIMAT 2.5 MCG/ACT Aero Soln INHALE 2 PUFFS ORALLY ONCE DAILY 4 g 4   • methocarbamol (ROBAXIN) 500 MG Tab TAKE 1 TO 2 TABLETS ORALLY 3 TIMES DAILY IF NEEDED FOR MUSCLE SPASM 90 Tab 2   • budesonide-formoterol (SYMBICORT) 160-4.5 MCG/ACT Aerosol INHALE 2 PUFFS ORALLY 2 TIMES DAILY 1 Inhaler 2   • meloxicam (MOBIC) 15 MG tablet Take 1 Tab by mouth 1 time daily as needed for Moderate Pain. 30 Tab 3   • gabapentin (NEURONTIN) 300 MG Cap Take 1 Cap by mouth every bedtime. 30 Cap 3   • triamterene-hydrochlorothiazide (MAXZIDE 75-50) 75-50 MG Tab TAKE 1 TABLET ORALLY EVERY MORNING 30 Tab 5   • albuterol (PROVENTIL) 2.5mg/3ml Nebu Soln solution for nebulization 2.5 mg by Nebulization route every four hours as needed for Shortness of Breath.     • albuterol 108 (90 BASE) MCG/ACT Aero Soln inhalation aerosol Inhale 2 Puffs by mouth every 6 hours as needed for Shortness of Breath. 8.5 g 3   • albuterol (VENTOLIN OR PROVENTIL) 108 (90 BASE) MCG/ACT Aero Soln inhalation aerosol Inhale 2 Puffs by mouth every four hours as needed for Shortness of Breath. 1 Inhaler 1     No current facility-administered medications for this visit.      She  has a past medical history of Chronic obstructive pulmonary disease (CMS-[facility]); Hypertension; and Low back pain.    ROS   As above in HPI     Objective:     Blood pressure 130/84, pulse 96, temperature 36.6 °C (97.9 °F), resp. rate 16, height 1.689 m (5' 6.5\"), weight 89.8 kg (198 lb), last menstrual period 05/19/2010, SpO2 97 %, not currently breastfeeding. Body mass index is 31.48 kg/m².   Physical Exam:  Constitutional: Alert, no distress.  Skin: Warm, dry, good turgor, macular hyperpigmented rash on both hands, wraps around to the palmar surface on the fingers of the right hand with small vesicles " .  Eye: Equal, round and reactive, conjunctiva clear, lids normal.  Respiratory: Unlabored respiratory effort  Psych: Alert and oriented x3, normal affect and mood.      Assessment and Plan:   The following treatment plan was discussed    1. Chronic bilateral low back pain with left-sided sciatica  With lumbar facet arthropathy. Has not been able to achieve adequate pain control with anti-inflammatory and muscle relaxers. Is interested in being evaluated by pain management for possible injections or other procedures. She is also interested in establishing with physical therapy which was referred about 6 months ago. She was given referral information in clinic today.  -Follow-up with physical therapy, patient to call if referral is   - REFERRAL TO PAIN CLINIC  -Continue Robaxin and meloxicam for now    2. Lumbar facet arthropathy  - REFERRAL TO PAIN CLINIC    3. Other eczema  Rashes consistent with dyshidrotic eczema. I have refilled her Kenalog cream. We discussed the cyclic nature of the rash and that after it resolves with the steroid cream and will likely return and she should just reapply the cream in that case. Refills were given.  - triamcinolone acetonide (KENALOG) 0.1 % Cream; Apply to rash on arm twice daily  Dispense: 45 g; Refill: 3    4. Shortness of breath  Etiology still remains unclear. Patient was given scheduling information for the clinical swallow study. We will have her stop the ranitidine and she didn't notice any difference while taking this. She will continue her current inhalers.  -Follow-up swallow study results  -Discontinue ranitidine  -Return to clinic after has had swallow study done        Followup: Return in about 6 weeks (around 2018), or if symptoms worsen or fail to improve, for or after she has had swallow eval.

## 2018-03-09 NOTE — ASSESSMENT & PLAN NOTE
Patient reports a flare of dyshidrotic eczema on her hands. She did use the Kenalog cream last time this happened with full resolution of the rash. She states that it is itchy and slightly painful in the areas where the skin has cracked. She ran out of the Kenalog cream so hasn't been able to use it again. She denies a rash on any other places outside of her hands. She wears gloves when she washes dishes and she tries to minimize the number of times she has to wash her hands during the day.

## 2018-03-09 NOTE — ASSESSMENT & PLAN NOTE
Patient has noticed that not only does her shortness of breath, and side with right after eating but also if she brings her knees up to the level of her chest when she washes in the shower or tries to tie her shoe. If she avoids this activity she does not feel it. She is still using her Symbicort [place] as needed inhaler instead of daily inhaler despite education to the contrary. She has not been able to have the clinical swallow evaluation done. She reports that she has been taking the ranitidine for about a month and hasn't noticed any improvement in her symptoms. She again denies heartburn.

## 2018-03-23 DIAGNOSIS — G89.29 CHRONIC BILATERAL LOW BACK PAIN WITH LEFT-SIDED SCIATICA: ICD-10-CM

## 2018-03-23 DIAGNOSIS — M54.42 CHRONIC BILATERAL LOW BACK PAIN WITH LEFT-SIDED SCIATICA: ICD-10-CM

## 2018-03-23 RX ORDER — GABAPENTIN 300 MG/1
CAPSULE ORAL
Qty: 30 CAP | Refills: 2 | Status: SHIPPED | OUTPATIENT
Start: 2018-03-23 | End: 2018-06-26 | Stop reason: SDUPTHER

## 2018-03-28 DIAGNOSIS — J44.9 COPD WITHOUT EXACERBATION (HCC): ICD-10-CM

## 2018-03-28 RX ORDER — RANITIDINE 150 MG/1
TABLET ORAL
Qty: 60 TAB | Refills: 2 | OUTPATIENT
Start: 2018-03-28

## 2018-03-28 RX ORDER — BUDESONIDE AND FORMOTEROL FUMARATE DIHYDRATE 160; 4.5 UG/1; UG/1
AEROSOL RESPIRATORY (INHALATION)
Qty: 1 INHALER | Refills: 2 | Status: SHIPPED | OUTPATIENT
Start: 2018-03-28 | End: 2018-07-27 | Stop reason: SDUPTHER

## 2018-03-29 ENCOUNTER — TELEPHONE (OUTPATIENT)
Dept: MEDICAL GROUP | Facility: MEDICAL CENTER | Age: 58
End: 2018-03-29

## 2018-03-29 RX ORDER — RANITIDINE 150 MG/1
150 TABLET ORAL
Qty: 60 TAB | Refills: 2 | Status: SHIPPED | OUTPATIENT
Start: 2018-03-29 | End: 2018-08-02 | Stop reason: SDUPTHER

## 2018-04-24 DIAGNOSIS — I10 ESSENTIAL HYPERTENSION: ICD-10-CM

## 2018-04-24 DIAGNOSIS — G89.29 CHRONIC BILATERAL LOW BACK PAIN WITH LEFT-SIDED SCIATICA: ICD-10-CM

## 2018-04-24 DIAGNOSIS — M54.42 CHRONIC BILATERAL LOW BACK PAIN WITH LEFT-SIDED SCIATICA: ICD-10-CM

## 2018-04-24 RX ORDER — MELOXICAM 15 MG/1
TABLET ORAL
Qty: 30 TAB | Refills: 2 | Status: SHIPPED | OUTPATIENT
Start: 2018-04-24 | End: 2018-07-25 | Stop reason: SDUPTHER

## 2018-04-24 RX ORDER — TRIAMTERENE AND HYDROCHLOROTHIAZIDE 75; 50 MG/1; MG/1
TABLET ORAL
Qty: 30 TAB | Refills: 2 | Status: SHIPPED | OUTPATIENT
Start: 2018-04-24 | End: 2018-07-23 | Stop reason: SDUPTHER

## 2018-05-01 ENCOUNTER — TELEPHONE (OUTPATIENT)
Dept: MEDICAL GROUP | Facility: MEDICAL CENTER | Age: 58
End: 2018-05-01

## 2018-05-01 DIAGNOSIS — M54.42 CHRONIC BILATERAL LOW BACK PAIN WITH LEFT-SIDED SCIATICA: ICD-10-CM

## 2018-05-01 DIAGNOSIS — G89.29 CHRONIC BILATERAL LOW BACK PAIN WITH LEFT-SIDED SCIATICA: ICD-10-CM

## 2018-05-01 DIAGNOSIS — M47.816 LUMBAR FACET ARTHROPATHY: ICD-10-CM

## 2018-05-02 NOTE — TELEPHONE ENCOUNTER
New referral placed.  Please let her know she needs to schedule within a month of today or it will .  Tata Gonsalez M.D.

## 2018-05-03 DIAGNOSIS — L30.8 OTHER ECZEMA: ICD-10-CM

## 2018-05-08 ENCOUNTER — PHYSICAL THERAPY (OUTPATIENT)
Dept: PHYSICAL THERAPY | Facility: REHABILITATION | Age: 58
End: 2018-05-08
Attending: INTERNAL MEDICINE
Payer: MEDICAID

## 2018-05-08 DIAGNOSIS — M47.816 LUMBAR FACET ARTHROPATHY: ICD-10-CM

## 2018-05-08 DIAGNOSIS — G89.29 CHRONIC BILATERAL LOW BACK PAIN WITH LEFT-SIDED SCIATICA: ICD-10-CM

## 2018-05-08 DIAGNOSIS — M54.42 CHRONIC BILATERAL LOW BACK PAIN WITH LEFT-SIDED SCIATICA: ICD-10-CM

## 2018-05-08 PROCEDURE — 97161 PT EVAL LOW COMPLEX 20 MIN: CPT

## 2018-05-08 RX ORDER — TRIAMCINOLONE ACETONIDE 1 MG/G
CREAM TOPICAL
Qty: 1 TUBE | Refills: 0 | Status: SHIPPED | OUTPATIENT
Start: 2018-05-08 | End: 2018-05-29 | Stop reason: SDUPTHER

## 2018-05-08 ASSESSMENT — ENCOUNTER SYMPTOMS
PAIN TIMING: DURING SLEEP
EXACERBATED BY: PROLONGED STANDING
EXACERBATED BY: TWISTING
PAIN TIMING: WHEN ACTIVE
PAIN SCALE AT LOWEST: 5
PAIN SCALE AT HIGHEST: 9
QUALITY: SHOOTING
EXACERBATED BY: PROLONGED SITTING
EXACERBATED BY: LIFTING
EXACERBATED BY: WALKING
ALLEVIATING FACTORS: PHARMACOTHERAPY
QUALITY: TIGHT
QUALITY: RADIATING
PAIN SCALE: 5
QUALITY: ACHING
EXACERBATED BY: BENDING
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
## 2018-05-23 ENCOUNTER — PHYSICAL THERAPY (OUTPATIENT)
Dept: PHYSICAL THERAPY | Facility: REHABILITATION | Age: 58
End: 2018-05-23
Attending: INTERNAL MEDICINE
Payer: MEDICAID

## 2018-05-23 DIAGNOSIS — G89.29 CHRONIC BILATERAL LOW BACK PAIN WITH LEFT-SIDED SCIATICA: ICD-10-CM

## 2018-05-23 DIAGNOSIS — M47.816 LUMBAR FACET ARTHROPATHY: ICD-10-CM

## 2018-05-23 DIAGNOSIS — M54.42 CHRONIC BILATERAL LOW BACK PAIN WITH LEFT-SIDED SCIATICA: ICD-10-CM

## 2018-05-23 PROCEDURE — 97110 THERAPEUTIC EXERCISES: CPT

## 2018-05-23 NOTE — OP THERAPY DAILY TREATMENT
"  Outpatient Physical Therapy  DAILY TREATMENT     [facility] Physical [address]  [address]  [place] [address]  Phone:  [phone]  Fax:  [phone]    Date: 05/23/2018    Patient: Anali Tellez  YOB: 1960  MRN: [id number]     Time Calculation  Start time: 1000  Stop time: 1029 Time Calculation (min): 29 minutes     Chief Complaint: Back Problem    Visit #: 2    SUBJECTIVE:  About the same.  Tried the lumbar extension one time, but too painful and stopped.  L LE pains continue.     OBJECTIVE:          Therapeutic Exercises (CPT 42355):     1. NuStep, L3 x 5 min, spm avg 23. , fearful of a COPD exacerbation despite no observed SOB and minimal effort required.      2. Prone laying , with diaphragm breathing, pillow under feet and hips, x 5 min    3. Prone laying with alt knee flexion, x 20 ea    4. Prone laying with alt TKE, x 20 ea    5. Trial of manual txn hooklying , x 5 min      Time-based treatments/modalities:  Therapeutic exercise minutes (CPT 99255): 29 minutes         ASSESSMENT:   Response to treatment: High pain behaviors and fear avoidance.  Observable \"fasiculations\" described as sharp muscle spasms in her spine.  Is able to be coached through relaxation techniques and resolve. Limited tolerance to therex today, but did leave with reported improvement of 50%.     PLAN/RECOMMENDATIONS:   Plan for treatment: therapy treatment to continue next visit.  Planned interventions for next visit: continue with current treatment.      "

## 2018-05-29 ENCOUNTER — PHYSICAL THERAPY (OUTPATIENT)
Dept: PHYSICAL THERAPY | Facility: REHABILITATION | Age: 58
End: 2018-05-29
Attending: INTERNAL MEDICINE
Payer: MEDICAID

## 2018-05-29 DIAGNOSIS — M47.816 LUMBAR FACET ARTHROPATHY: ICD-10-CM

## 2018-05-29 DIAGNOSIS — L30.8 OTHER ECZEMA: ICD-10-CM

## 2018-05-29 DIAGNOSIS — M54.42 CHRONIC BILATERAL LOW BACK PAIN WITH LEFT-SIDED SCIATICA: ICD-10-CM

## 2018-05-29 DIAGNOSIS — G89.29 CHRONIC BILATERAL LOW BACK PAIN WITH LEFT-SIDED SCIATICA: ICD-10-CM

## 2018-05-29 PROCEDURE — 97110 THERAPEUTIC EXERCISES: CPT

## 2018-05-29 RX ORDER — TRIAMCINOLONE ACETONIDE 1 MG/G
CREAM TOPICAL
Qty: 1 TUBE | Refills: 2 | Status: SHIPPED | OUTPATIENT
Start: 2018-05-29 | End: 2018-07-05 | Stop reason: SDUPTHER

## 2018-05-29 NOTE — OP THERAPY DAILY TREATMENT
"  Outpatient Physical Therapy  DAILY TREATMENT     [facility] Physical Therapy [address]  [address]  [place] [address]  Phone:  [phone]  Fax:  [phone]    Date: 05/29/2018    Patient: Anali Tellez  YOB: 1960  MRN: [id number]     Time Calculation  Start time: 1402  Stop time: 1457 Time Calculation (min): 55 minutes     Chief Complaint: Back Problem    Visit #: 3    SUBJECTIVE:  I had a lot of back spasms after last session.  Has tried laying on her stomach at home- doesn't help.  \"Seems the same\" overall.     OBJECTIVE:        Therapeutic Exercises (CPT 56807):     1. NuStep, L5 x 5 min, spm avg 47     2. Prone laying , with diaphragm breathing, pillow under feet and hips, x 2 min    3. Prone laying with alt knee flexion, x 20 ea    4. Prone laying with alt TKE, x 20 ea    5. PPU FPC to elbows, x 8 reps    6. LTR, x 15 to midrange    7. Ball rolls, x 30    Therapeutic Treatments and Modalities:     1. E Stim Unattended (CPT 23396), IFC and MH in prone to L.S x 15 min    Time-based treatments/modalities:  Therapeutic exercise minutes (CPT 08110): 30 minutes       ASSESSMENT:   Response to treatment: Despite her subjective report, pt had decreased fear avoidance noted by increased SPM on nustep and decreased hesitation in laying prone.  Able to get up onto elbow today.     PLAN/RECOMMENDATIONS:   Plan for treatment: therapy treatment to continue next visit.  Planned interventions for next visit: continue with current treatment.      "

## 2018-05-30 ENCOUNTER — TELEPHONE (OUTPATIENT)
Dept: PHYSICAL THERAPY | Facility: REHABILITATION | Age: 58
End: 2018-05-30

## 2018-05-30 NOTE — OP THERAPY DISCHARGE SUMMARY
"  Outpatient Physical Therapy  DISCHARGE SUMMARY NOTE      [facility] Physical Therapy [address]  [address]  [address]  [place] [address]  Phone:  [phone]  Fax:  [phone]    Date of Visit: 05/30/2018    Patient: Anali Tellez  YOB: 1960  MRN: [id number]     Referring Provider: Tata Gonsalez M.D.  [address]  [address]  [place] [address]   Referring Diagnosis Chronic bilateral low back pain with left-sided sciatica [M54.42, G89.29];Lumbar facet arthropathy (HCC) [M46.96]     Physical Therapy Occurrence Codes    Date of onset of impairment:  5/8/10   Date physical therapy care plan established or reviewed:  5/8/18   Date physical therapy treatment started:  5/8/18        Your patient is being discharged from Physical Therapy with the following comments:   · Goals not met    Comments:  Ms. Tellez was seen only for 3 visits for treatment of her LBP.  The treatment approach was very conservative due to high pain behaviors.  Treatment included light core training and ROM in neutral spine positions as well as pain education.  Despite always leaving the clinic with a subjective report of less pain, she called to cancel all remaining appts due to \"excruciating pain\" after sessions.     Recommendations:  D/C per pt request.     Emily Payton, PT, DPT    Date: 5/30/2018  "

## 2018-06-05 ENCOUNTER — APPOINTMENT (OUTPATIENT)
Dept: PHYSICAL THERAPY | Facility: REHABILITATION | Age: 58
End: 2018-06-05
Attending: INTERNAL MEDICINE
Payer: MEDICAID

## 2018-06-07 ENCOUNTER — APPOINTMENT (OUTPATIENT)
Dept: PHYSICAL THERAPY | Facility: REHABILITATION | Age: 58
End: 2018-06-07
Attending: INTERNAL MEDICINE
Payer: MEDICAID

## 2018-06-12 ENCOUNTER — APPOINTMENT (OUTPATIENT)
Dept: PHYSICAL THERAPY | Facility: REHABILITATION | Age: 58
End: 2018-06-12
Attending: INTERNAL MEDICINE
Payer: MEDICAID

## 2018-06-22 ENCOUNTER — TELEPHONE (OUTPATIENT)
Dept: MEDICAL GROUP | Facility: MEDICAL CENTER | Age: 58
End: 2018-06-22

## 2018-06-22 NOTE — TELEPHONE ENCOUNTER
Patient came into the office stating the cream for the eczema is not really working for her. She would like to know if she continues to use it, or what you would like her to do.  Please call back # [phone]

## 2018-06-23 NOTE — TELEPHONE ENCOUNTER
Please have her make an appointment.  She can stop the cream if ineffective.  Tata Gonsalez M.D.

## 2018-06-26 DIAGNOSIS — M54.42 CHRONIC BILATERAL LOW BACK PAIN WITH LEFT-SIDED SCIATICA: ICD-10-CM

## 2018-06-26 DIAGNOSIS — G89.29 CHRONIC BILATERAL LOW BACK PAIN WITH LEFT-SIDED SCIATICA: ICD-10-CM

## 2018-06-26 RX ORDER — GABAPENTIN 300 MG/1
CAPSULE ORAL
Qty: 30 CAP | Refills: 2 | Status: SHIPPED | OUTPATIENT
Start: 2018-06-26 | End: 2018-10-09 | Stop reason: SDUPTHER

## 2018-07-06 RX ORDER — METHOCARBAMOL 500 MG/1
TABLET, FILM COATED ORAL
Qty: 90 TAB | Refills: 1 | Status: SHIPPED | OUTPATIENT
Start: 2018-07-06 | End: 2018-10-09 | Stop reason: SDUPTHER

## 2018-07-23 DIAGNOSIS — I10 ESSENTIAL HYPERTENSION: ICD-10-CM

## 2018-07-24 RX ORDER — TRIAMTERENE AND HYDROCHLOROTHIAZIDE 75; 50 MG/1; MG/1
TABLET ORAL
Qty: 30 TAB | Refills: 3 | Status: SHIPPED | OUTPATIENT
Start: 2018-07-24 | End: 2018-11-21 | Stop reason: SDUPTHER

## 2018-07-25 DIAGNOSIS — M54.42 CHRONIC BILATERAL LOW BACK PAIN WITH LEFT-SIDED SCIATICA: ICD-10-CM

## 2018-07-25 DIAGNOSIS — G89.29 CHRONIC BILATERAL LOW BACK PAIN WITH LEFT-SIDED SCIATICA: ICD-10-CM

## 2018-07-25 RX ORDER — MELOXICAM 15 MG/1
TABLET ORAL
Qty: 30 TAB | Refills: 2 | Status: SHIPPED | OUTPATIENT
Start: 2018-07-25 | End: 2018-07-27

## 2018-07-27 ENCOUNTER — HOSPITAL ENCOUNTER (OUTPATIENT)
Facility: MEDICAL CENTER | Age: 58
End: 2018-07-27
Attending: INTERNAL MEDICINE
Payer: MEDICAID

## 2018-07-27 ENCOUNTER — OFFICE VISIT (OUTPATIENT)
Dept: MEDICAL GROUP | Facility: MEDICAL CENTER | Age: 58
End: 2018-07-27
Attending: INTERNAL MEDICINE
Payer: MEDICAID

## 2018-07-27 VITALS
OXYGEN SATURATION: 100 % | SYSTOLIC BLOOD PRESSURE: 110 MMHG | HEART RATE: 78 BPM | WEIGHT: 181 LBS | HEIGHT: 66 IN | RESPIRATION RATE: 16 BRPM | BODY MASS INDEX: 29.09 KG/M2 | DIASTOLIC BLOOD PRESSURE: 70 MMHG | TEMPERATURE: 97.9 F

## 2018-07-27 DIAGNOSIS — Z13.220 SCREENING, LIPID: ICD-10-CM

## 2018-07-27 DIAGNOSIS — R21 RASH: ICD-10-CM

## 2018-07-27 DIAGNOSIS — G89.29 CHRONIC BILATERAL LOW BACK PAIN WITH LEFT-SIDED SCIATICA: ICD-10-CM

## 2018-07-27 DIAGNOSIS — M54.42 CHRONIC BILATERAL LOW BACK PAIN WITH LEFT-SIDED SCIATICA: ICD-10-CM

## 2018-07-27 DIAGNOSIS — R73.03 PRE-DIABETES: ICD-10-CM

## 2018-07-27 DIAGNOSIS — I10 ESSENTIAL HYPERTENSION: ICD-10-CM

## 2018-07-27 PROCEDURE — 88305 TISSUE EXAM BY PATHOLOGIST: CPT

## 2018-07-27 PROCEDURE — 11100 PR BIOPSY OF SKIN LESION: CPT | Performed by: INTERNAL MEDICINE

## 2018-07-27 PROCEDURE — 99214 OFFICE O/P EST MOD 30 MIN: CPT | Mod: 25 | Performed by: INTERNAL MEDICINE

## 2018-07-27 PROCEDURE — 99212 OFFICE O/P EST SF 10 MIN: CPT | Performed by: INTERNAL MEDICINE

## 2018-07-27 RX ORDER — BUDESONIDE AND FORMOTEROL FUMARATE DIHYDRATE 160; 4.5 UG/1; UG/1
AEROSOL RESPIRATORY (INHALATION)
Qty: 1 INHALER | Refills: 3 | Status: SHIPPED | OUTPATIENT
Start: 2018-07-27 | End: 2019-06-04 | Stop reason: SDUPTHER

## 2018-07-27 RX ORDER — CLOBETASOL PROPIONATE 0.5 MG/G
CREAM TOPICAL
Qty: 30 G | Refills: 0 | Status: SHIPPED | OUTPATIENT
Start: 2018-07-27 | End: 2018-08-28

## 2018-07-27 RX ORDER — NAPROXEN 500 MG/1
500 TABLET ORAL 2 TIMES DAILY WITH MEALS
Qty: 60 TAB | Refills: 0 | Status: SHIPPED | OUTPATIENT
Start: 2018-07-27 | End: 2018-10-24

## 2018-07-27 ASSESSMENT — PAIN SCALES - GENERAL: PAINLEVEL: 8=MODERATE-SEVERE PAIN

## 2018-07-27 NOTE — ASSESSMENT & PLAN NOTE
Patient continues on Maxide 75-50 which controls her blood pressure well.  In clinic today she is 110/70.  Her last metabolic panel was checked over a year ago.

## 2018-07-27 NOTE — ASSESSMENT & PLAN NOTE
Patient continues to have the persistent rash on her right hand and arm.  Reports that it is very itchy still.  It forms small pustules over her right forearm mostly on the dorsal surface that sometimes contain clear fluid.  She scratches them frequently and sometimes they will bleed.  She has noticed dark pigmentation of her hands and thickening of the skin over her fingers.  The left hand is not affected.  She denies contact with irritants such as harsh chemicals or frequent handwashing.  She is right-handed.  She believes the rash started when she 14 and has come and gone throughout her life.  She states it is usually worse in the summers.  She has never been able to identify a trigger.  She has been using the triamcinolone cream with some improvement in symptoms and improvement in itching but no resolution of the rash.  She reports using it twice daily.  She has not noticed the rash anywhere else on her body.

## 2018-07-27 NOTE — PROGRESS NOTES
Subjective:   Anali Tellez is a 58 y.o. female here today for worsening rash over her right hand and arm, back pain    Rash  Patient continues to have the persistent rash on her right hand and arm.  Reports that it is very itchy still.  It forms small pustules over her right forearm mostly on the dorsal surface that sometimes contain clear fluid.  She scratches them frequently and sometimes they will bleed.  She has noticed dark pigmentation of her hands and thickening of the skin over her fingers.  The left hand is not affected.  She denies contact with irritants such as harsh chemicals or frequent handwashing.  She is right-handed.  She believes the rash started when she 14 and has come and gone throughout her life.  She states it is usually worse in the summers.  She has never been able to identify a trigger.  She has been using the triamcinolone cream with some improvement in symptoms and improvement in itching but no resolution of the rash.  She reports using it twice daily.  She has not noticed the rash anywhere else on her body.    Pre-diabetes  Previous A1c of 5.7 on 4/4/17.  Patient is due for her yearly surveillance labs.    HTN (hypertension)  Patient continues on Maxide 75-50 which controls her blood pressure well.  In clinic today she is 110/70.  Her last metabolic panel was checked over a year ago.    Chronic bilateral low back pain with left-sided sciatica  Patient has established care with [facility]  She is currently taking Norco 5/325 daily.  An MRI of her lower back has been ordered through their office.  He is concerned for radiculopathy and wanted to do a procedure however an EMG is required before it can be authorized by insurance so they are in the process of setting that up for the patient.  She continues to take her Robaxin 500 mg 1-2 tablets as needed which is helpful for the spasms that she has in her back and neck.  She has run out of her Norco because she only  "received 14 tablets at the beginning of this month and she is not able to get a refill until August 3.  She is currently taking meloxicam but is asking for a different pain medication to help until she is able to get the Norco filled.  We discussed that she has signed a pain contract with pain management prohibiting me from prescribing her any opiates.       Current medicines (including changes today)  Current Outpatient Prescriptions   Medication Sig Dispense Refill   • clobetasol (TEMOVATE) 0.05 % Cream Apply thin layer to rash on hands twice daily as needed for 1 week 30 g 0   • naproxen (NAPROSYN) 500 MG Tab Take 1 Tab by mouth 2 times a day, with meals. 60 Tab 0   • budesonide-formoterol (SYMBICORT) 160-4.5 MCG/ACT Aerosol INHALE 2 PUFFS ORALLY 2 TIMES DAILY 1 Inhaler 3   • triamterene-hydrochlorothiazide (MAXZIDE 75-50) 75-50 MG Tab TAKE 1 TABLET ORALLY IN THE MORNING 30 Tab 3   • methocarbamol (ROBAXIN) 500 MG Tab TAKE 1 TO 2 TABLETS ORALLY 3 TIMES DAILY IF NEEDED FOR MUSCLE SPASM 90 Tab 1   • gabapentin (NEURONTIN) 300 MG Cap TAKE 1 CAPSULE ORALLY NIGHTLY AT BEDTIME 30 Cap 2   • raNITidine (ZANTAC) 150 MG Tab Take 1 Tab by mouth 2 times daily with meals as needed for Heartburn. 60 Tab 2   • SPIRIVA RESPIMAT 2.5 MCG/ACT Aero Soln INHALE 2 PUFFS ORALLY ONCE DAILY 4 g 4   • albuterol (PROVENTIL) 2.5mg/3ml Nebu Soln solution for nebulization 2.5 mg by Nebulization route every four hours as needed for Shortness of Breath.     • albuterol 108 (90 BASE) MCG/ACT Aero Soln inhalation aerosol Inhale 2 Puffs by mouth every 6 hours as needed for Shortness of Breath. 8.5 g 3     No current facility-administered medications for this visit.      She  has a past medical history of Chronic obstructive pulmonary disease (HCC); Hypertension; and Low back pain.    ROS   As above in HPI     Objective:     Blood pressure 110/70, pulse 78, temperature 36.6 °C (97.9 °F), resp. rate 16, height 1.689 m (5' 6.5\"), weight 82.1 kg (181 " lb), last menstrual period 05/19/2010, SpO2 100 %, not currently breastfeeding. Body mass index is 28.78 kg/m².   Physical Exam:  Constitutional: Alert, no distress.  Skin: Warm, dry, good turgor, significant hyperpigmentation and skin thickening over fingers of right hand as well as patchy areas of hyperpigmentation over palm, 1-2 mm raised papules over right forearm with excoriation, no increased warmth or erythema, no purulent discharge, no tenderness to palpation.  Some scarring over left forearm but no rash on left arm or hand currently.    Eye: Equal, round and reactive, conjunctiva clear, lids normal.  ENMT: Lips without lesions, good dentition, oropharynx clear, TM's clear bilaterally  Psych: Alert and oriented x3, normal affect and mood.    Procedure note  Risk and benefit of punch biopsy was explained to patient and she agreed to proceed.  Skin over right forearm lesion was prepped with alcohol.  Approximately 1 cc of 1% Xylocaine without epi was injected subcutaneously to achieve anesthesia.  A 3 mm punch was then used to take the biopsy.  Pressure was applied to achieve hemostasis and the wound was dressed with a Band-Aid.  Patient tolerated the procedure well without complication.    Assessment and Plan:   The following treatment plan was discussed    1. Rash  My highest suspicion is still for an allergic or contact dermatitis.  She improves with topical corticosteroid medium potency but does not completely resolve so we will try her with a short course of a higher potency corticosteroid.  I have advised to use it for no longer than a week and not put it on her face or genital region.  We discussed that it could cause worsening of hyperpigmentation and should not be used long-term.  We have also done a punch biopsy of 1 of the lesions on her arm and we will follow-up on the pathology.  Unfortunately there are no dermatologists in the renal area that will accept her insurance so we cannot refer her.  -  clobetasol (TEMOVATE) 0.05 % Cream; Apply thin layer to rash on hands twice daily as needed for 1 week  Dispense: 30 g; Refill: 0  - PATHOLOGY SPECIMEN; Future    2. Essential hypertension  Stable, well-controlled on current medications.  We will update labs.  - COMP METABOLIC PANEL; Future    3. Screening, lipid  - LIPID PROFILE; Future    4. Pre-diabetes  Last A1c 5.7, due for yearly surveillance A1c  - HEMOGLOBIN A1C; Future    5. Chronic bilateral low back pain with left-sided sciatica  Currently under the care of pain management.  Suspect that she did not get the fulfill of her Norco this month due to insurance requiring prior authorization.  Review of  shows only 14 tablets dispensed on 7/6/18.  Discussed with patient that according to the pain contract through her pain management doctor, I am not able to prescribe her any opiates or controlled substances which include tramadol that she is asking for.  We will switch her off of the meloxicam to naproxen to see if she responds better to a different NSAID.  We discussed discontinuing the meloxicam and starting the naproxen twice daily as needed.  If she finds out that the meloxicam was more effective she could switch back to it but she was counseled not to take both together.  She will continue to follow-up with her pain management doctor for further procedures and opiate prescription.  -Continue follow-up with pain management  - naproxen (NAPROSYN) 500 MG Tab; Take 1 Tab by mouth 2 times a day, with meals.  Dispense: 60 Tab; Refill: 0  -Discontinue meloxicam  -Continue Robaxin 500-1000 mg 3 times daily as needed        Followup: Return in about 4 weeks (around 8/24/2018) for rash on arm.

## 2018-07-27 NOTE — ASSESSMENT & PLAN NOTE
Patient has established care with Dr. Bunch of [facility].  She is currently taking Norco 5/325 daily.  An MRI of her lower back has been ordered through their office.  He is concerned for radiculopathy and wanted to do a procedure however an EMG is required before it can be authorized by insurance so they are in the process of setting that up for the patient.  She continues to take her Robaxin 500 mg 1-2 tablets as needed which is helpful for the spasms that she has in her back and neck.  She has run out of her Norco because she only received 14 tablets at the beginning of this month and she is not able to get a refill until August 3.  She is currently taking meloxicam but is asking for a different pain medication to help until she is able to get the Norco filled.  We discussed that she has signed a pain contract with pain management prohibiting me from prescribing her any opiates.

## 2018-08-02 RX ORDER — RANITIDINE 150 MG/1
TABLET ORAL
Qty: 60 TAB | Refills: 5 | Status: SHIPPED | OUTPATIENT
Start: 2018-08-02 | End: 2019-03-27 | Stop reason: SDUPTHER

## 2018-08-02 NOTE — TELEPHONE ENCOUNTER
Call Back Number: [phone]      Patient approves a detailed voicemail message: yes    REFILL REQUEST   Pt came into office to requested a refill on her Ranitidine HCl 150 MG. She had already let pharmacy know she needed a refill, but she stopped by the  to see if we could fill it faster. I let her know we would call her when the prescription was ready to get picked up, pt stated she'd just wait in the pharmacy lobby next door.

## 2018-08-07 ENCOUNTER — TELEPHONE (OUTPATIENT)
Dept: MEDICAL GROUP | Facility: MEDICAL CENTER | Age: 58
End: 2018-08-07

## 2018-08-07 DIAGNOSIS — L08.0 PUSTULAR RASH: ICD-10-CM

## 2018-08-07 NOTE — TELEPHONE ENCOUNTER
----- Message from Tata Gonsalez M.D. sent at 8/3/2018  3:09 PM PDT -----  Please let patient know we have the results of her skin biopsy back.  Unfortunately, it was not as helpful as we would have liked in trying to figure out what is going on with her rash.  All they could see is that the skin is irritated and inflamed but no specific diagnosis could be made.  Please ask her if the new cream is helping (clobetasol).  Tata Gonsalez M.D.

## 2018-08-07 NOTE — TELEPHONE ENCOUNTER
Phone Number Called: [phone] (home)       Message: LVM FOR PT TO CALL BACK FOR RESULTS     Left Message for patient to call back: yes

## 2018-08-08 NOTE — TELEPHONE ENCOUNTER
Called patient and informed her of results. Patient states that the cream is starting to help, but she has only used it for 1 week.

## 2018-08-14 NOTE — TELEPHONE ENCOUNTER
"1. Caller Name: Anali       Call Back Number: [phone]      Patient approves a detailed voicemail message: yes     Patient came by the office, stated she is allergic to the cream that was prescribed (Clobetasol Propionate). Patient stated she is allergic to latex and petroleum jelly, so now she has \"blisters filled with water.\"  Patient said she would not refill it and wait for a phone call back to see if something else could be prescribed.  "

## 2018-08-15 NOTE — TELEPHONE ENCOUNTER
At this point, I think we will have to refer her to dermatology.  The clobetasol is the strongest steroid cream, and she has not responded to other treatments.  The biopsy we did was not very helpful in determining what is going on.  Unfortunately, dermatology is available only in [place] for her insurance.    Tata Gonsalez M.D.

## 2018-08-28 ENCOUNTER — OFFICE VISIT (OUTPATIENT)
Dept: MEDICAL GROUP | Facility: MEDICAL CENTER | Age: 58
End: 2018-08-28
Attending: INTERNAL MEDICINE
Payer: MEDICAID

## 2018-08-28 VITALS
WEIGHT: 185 LBS | HEIGHT: 66 IN | TEMPERATURE: 98.6 F | BODY MASS INDEX: 29.73 KG/M2 | DIASTOLIC BLOOD PRESSURE: 72 MMHG | HEART RATE: 74 BPM | OXYGEN SATURATION: 99 % | SYSTOLIC BLOOD PRESSURE: 120 MMHG | RESPIRATION RATE: 16 BRPM

## 2018-08-28 DIAGNOSIS — J06.9 VIRAL URI: ICD-10-CM

## 2018-08-28 DIAGNOSIS — R21 RASH: ICD-10-CM

## 2018-08-28 PROCEDURE — 99212 OFFICE O/P EST SF 10 MIN: CPT | Performed by: INTERNAL MEDICINE

## 2018-08-28 PROCEDURE — 99214 OFFICE O/P EST MOD 30 MIN: CPT | Performed by: INTERNAL MEDICINE

## 2018-08-28 RX ORDER — TRIAMCINOLONE ACETONIDE 1 MG/G
CREAM TOPICAL
Qty: 60 G | Refills: 3 | Status: SHIPPED | OUTPATIENT
Start: 2018-08-28 | End: 2018-11-06 | Stop reason: SDUPTHER

## 2018-08-28 ASSESSMENT — PAIN SCALES - GENERAL: PAINLEVEL: 3=SLIGHT PAIN

## 2018-08-28 NOTE — ASSESSMENT & PLAN NOTE
Patient reports feeling sick for 5 days.  Denies any recent sick contacts.  Complains of nasal congestion and cough.  States that she is coughing up some yellow mucus.  Was feeling cold but no documented fevers.  Denies increased shortness of breath and has not had to use her nebulizer or her inhaler more frequently.  Feels like she is getting a little bit better.  Has not taken any OTC medications.

## 2018-08-28 NOTE — ASSESSMENT & PLAN NOTE
Patient presents for follow-up rash on her hands and arms.  Again, this is been a long-term problem for her since she was 14 years old.  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  She has never seen a dermatologist for this.  It tends to wax and wane throughout the year.  She denies frequent handwashing or exposure of her hands to chemicals currently.  She reports cracking and peeling of the skin.  At her last visit, we tried her on some clobetasol cream and she states that she believes she is allergic to petroleum jelly because when she put it on, her hands became very hot and itchy.  It does appear that the rash on her upper arms has improved somewhat.  She states that when she was using the triamcinolone regularly, she had better control of her symptoms.  We did biopsy the rash at her last visit which was unrevealing.  We discussed a dermatology referral but she would have to go to [place] for this and she is not interested in doing so now.  Besides her arms, she has started to notice the rash on both sides of her neck.  She has never seen it on her face.  It is itchy but not painful.

## 2018-08-28 NOTE — PROGRESS NOTES
Subjective:   Anali Tellez is a 58 y.o. female here today for follow up rash on arms/hands, feeling sick    Rash  Patient presents for follow-up rash on her hands and arms.  Again, this is been a long-term problem for her since she was 14 years old.  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  She has never seen a dermatologist for this.  It tends to wax and wane throughout the year.  She denies frequent handwashing or exposure of her hands to chemicals currently.  She reports cracking and peeling of the skin.  At her last visit, we tried her on some clobetasol cream and she states that she believes she is allergic to petroleum jelly because when she put it on, her hands became very hot and itchy.  It does appear that the rash on her upper arms has improved somewhat.  She states that when she was using the triamcinolone regularly, she had better control of her symptoms.  We did biopsy the rash at her last visit which was unrevealing.  We discussed a dermatology referral but she would have to go to [place] for this and she is not interested in doing so now.  Besides her arms, she has started to notice the rash on both sides of her neck.  She has never seen it on her face.  It is itchy but not painful.    Viral URI  Patient reports feeling sick for 5 days.  Denies any recent sick contacts.  Complains of nasal congestion and cough.  States that she is coughing up some yellow mucus.  Was feeling cold but no documented fevers.  Denies increased shortness of breath and has not had to use her nebulizer or her inhaler more frequently.  Feels like she is getting a little bit better.  Has not taken any OTC medications.       Current medicines (including changes today)  Current Outpatient Prescriptions   Medication Sig Dispense Refill   • triamcinolone acetonide (KENALOG) 0.1 % Cream Apply thin layer on 60 g 3   • raNITidine (ZANTAC) 150 MG Tab TAKE 1  "TABLET ORALLY 2 TIMES DAILY IF NEEDED FOR HEARTBURN WITH MEALS 60 Tab 5   • naproxen (NAPROSYN) 500 MG Tab Take 1 Tab by mouth 2 times a day, with meals. 60 Tab 0   • budesonide-formoterol (SYMBICORT) 160-4.5 MCG/ACT Aerosol INHALE 2 PUFFS ORALLY 2 TIMES DAILY 1 Inhaler 3   • triamterene-hydrochlorothiazide (MAXZIDE 75-50) 75-50 MG Tab TAKE 1 TABLET ORALLY IN THE MORNING 30 Tab 3   • methocarbamol (ROBAXIN) 500 MG Tab TAKE 1 TO 2 TABLETS ORALLY 3 TIMES DAILY IF NEEDED FOR MUSCLE SPASM 90 Tab 1   • gabapentin (NEURONTIN) 300 MG Cap TAKE 1 CAPSULE ORALLY NIGHTLY AT BEDTIME 30 Cap 2   • SPIRIVA RESPIMAT 2.5 MCG/ACT Aero Soln INHALE 2 PUFFS ORALLY ONCE DAILY 4 g 4   • albuterol (PROVENTIL) 2.5mg/3ml Nebu Soln solution for nebulization 2.5 mg by Nebulization route every four hours as needed for Shortness of Breath.     • albuterol 108 (90 BASE) MCG/ACT Aero Soln inhalation aerosol Inhale 2 Puffs by mouth every 6 hours as needed for Shortness of Breath. 8.5 g 3     No current facility-administered medications for this visit.      She  has a past medical history of Chronic obstructive pulmonary disease (HCC); Hypertension; and Low back pain.    ROS   As above in HPI  Denies chest pain     Objective:     Blood pressure 120/72, pulse 74, temperature 37 °C (98.6 °F), resp. rate 16, height 1.689 m (5' 6.5\"), weight 83.9 kg (185 lb), last menstrual period 05/19/2010, SpO2 99 %, not currently breastfeeding. Body mass index is 29.42 kg/m².   Physical Exam:  Constitutional: Alert, no distress.  Skin: Warm, dry, good turgor, thickening of skin over bilateral hands with cracking of thumb pad on right, papules on lateral aspects of neck and both wrists but reduced over forearms.  Eye: Equal, round and reactive, conjunctiva clear, lids normal.  ENMT: Lips without lesions, good dentition, oropharynx clear, TM's clear bilaterally, turbinates injected bilaterally, no tenderness over frontal or maxillary sinuses  Neck: Trachea midline, no " masses, no thyromegaly. No cervical or supraclavicular lymphadenopathy  Respiratory: Unlabored respiratory effort, lungs clear to auscultation, no wheezes, no ronchi.  Cardiovascular: Regular rate and rhythm, no murmurs appreciated      Results and Imaging:   Biopsy rash (7/27/18):  FINAL DIAGNOSIS:    A. Right arm:         Superficial perivascular predominantly lymphocytic dermatitis.    COMMENT:  The biopsy is rather small and shows very few distinguishing marks in  the pattern of inflammation. The differential remains broad but may  include entities such as viral exanthem, drug eruption, late urticaria,  or erythema figuratum.    Assessment and Plan:   The following treatment plan was discussed    1. Rash  Biopsy was consistent with a lymphocytic dermatitis.  She has had some response to triamcinolone in the past.  I have refilled this for her today.  Given that she feels she had a reaction to the clobetasol, we will stop it.  I offered her a dermatology referral which she declines at this time because she cannot travel to [place].  We discussed applying a non-fragrance cream to her hands at night and wearing gloves to help with some of the dryness and cracking.  - triamcinolone acetonide (KENALOG) 0.1 % Cream; Apply thin layer on  Dispense: 60 g; Refill: 3  -Moisturizer with gloves at night  -Consider dermatology referral in the future if no improvement, patient declines at this time    2. Viral URI  Vitals are stable, lungs are clear, and there is no evidence of an acute bacterial process going on.  Since the patient is starting to feel better, I advised conservative care with over-the-counter decongestant such as Afrin or pseudoephedrine.  Patient will let us know if she stops improving or if symptoms worsen, especially in terms of her shortness of breath and cough.  At that point I would prescribe her a course of azithromycin and possibly steroids.        Followup: Return in about 3 months (around  11/28/2018), or if symptoms worsen or fail to improve, for labs, hypertension.

## 2018-09-11 RX ORDER — TIOTROPIUM BROMIDE INHALATION SPRAY 3.12 UG/1
SPRAY, METERED RESPIRATORY (INHALATION)
Qty: 4 G | Refills: 5 | Status: SHIPPED | OUTPATIENT
Start: 2018-09-11 | End: 2018-10-11

## 2018-10-09 DIAGNOSIS — G89.29 CHRONIC BILATERAL LOW BACK PAIN WITH LEFT-SIDED SCIATICA: ICD-10-CM

## 2018-10-09 DIAGNOSIS — M54.42 CHRONIC BILATERAL LOW BACK PAIN WITH LEFT-SIDED SCIATICA: ICD-10-CM

## 2018-10-09 RX ORDER — GABAPENTIN 300 MG/1
CAPSULE ORAL
Qty: 30 CAP | Refills: 3 | Status: SHIPPED | OUTPATIENT
Start: 2018-10-09 | End: 2019-02-05 | Stop reason: SDUPTHER

## 2018-10-09 RX ORDER — METHOCARBAMOL 500 MG/1
TABLET, FILM COATED ORAL
Qty: 90 TAB | Refills: 3 | Status: SHIPPED | OUTPATIENT
Start: 2018-10-09 | End: 2019-04-17 | Stop reason: SDUPTHER

## 2018-10-11 RX ORDER — TIOTROPIUM BROMIDE INHALATION SPRAY 3.12 UG/1
SPRAY, METERED RESPIRATORY (INHALATION)
Qty: 4 G | Refills: 5 | Status: SHIPPED | OUTPATIENT
Start: 2018-10-11 | End: 2019-06-04 | Stop reason: SDUPTHER

## 2018-10-23 DIAGNOSIS — M54.42 CHRONIC BILATERAL LOW BACK PAIN WITH LEFT-SIDED SCIATICA: ICD-10-CM

## 2018-10-23 DIAGNOSIS — G89.29 CHRONIC BILATERAL LOW BACK PAIN WITH LEFT-SIDED SCIATICA: ICD-10-CM

## 2018-10-24 RX ORDER — MELOXICAM 15 MG/1
TABLET ORAL
Qty: 30 TAB | Refills: 2 | Status: SHIPPED | OUTPATIENT
Start: 2018-10-24 | End: 2019-01-25 | Stop reason: SDUPTHER

## 2018-11-06 DIAGNOSIS — R21 RASH: ICD-10-CM

## 2018-11-06 RX ORDER — TRIAMCINOLONE ACETONIDE 1 MG/G
CREAM TOPICAL
Qty: 60 G | Refills: 2 | Status: SHIPPED | OUTPATIENT
Start: 2018-11-06 | End: 2019-04-04

## 2018-11-21 DIAGNOSIS — I10 ESSENTIAL HYPERTENSION: ICD-10-CM

## 2018-11-21 RX ORDER — TRIAMTERENE AND HYDROCHLOROTHIAZIDE 75; 50 MG/1; MG/1
TABLET ORAL
Qty: 30 TAB | Refills: 3 | Status: SHIPPED | OUTPATIENT
Start: 2018-11-21 | End: 2019-03-27 | Stop reason: SDUPTHER

## 2019-01-25 DIAGNOSIS — M54.42 CHRONIC BILATERAL LOW BACK PAIN WITH LEFT-SIDED SCIATICA: ICD-10-CM

## 2019-01-25 DIAGNOSIS — G89.29 CHRONIC BILATERAL LOW BACK PAIN WITH LEFT-SIDED SCIATICA: ICD-10-CM

## 2019-01-29 RX ORDER — MELOXICAM 15 MG/1
TABLET ORAL
Qty: 30 TAB | Refills: 1 | Status: SHIPPED | OUTPATIENT
Start: 2019-01-29 | End: 2019-04-04

## 2019-02-05 DIAGNOSIS — M54.42 CHRONIC BILATERAL LOW BACK PAIN WITH LEFT-SIDED SCIATICA: ICD-10-CM

## 2019-02-05 DIAGNOSIS — G89.29 CHRONIC BILATERAL LOW BACK PAIN WITH LEFT-SIDED SCIATICA: ICD-10-CM

## 2019-02-05 RX ORDER — GABAPENTIN 300 MG/1
CAPSULE ORAL
Qty: 30 CAP | Refills: 3 | Status: SHIPPED | OUTPATIENT
Start: 2019-02-05 | End: 2019-05-20 | Stop reason: SDUPTHER

## 2019-03-27 DIAGNOSIS — I10 ESSENTIAL HYPERTENSION: ICD-10-CM

## 2019-03-27 RX ORDER — RANITIDINE 150 MG/1
TABLET ORAL
Qty: 60 TAB | Refills: 3 | Status: SHIPPED | OUTPATIENT
Start: 2019-03-27 | End: 2019-08-09 | Stop reason: SDUPTHER

## 2019-03-27 RX ORDER — TRIAMTERENE AND HYDROCHLOROTHIAZIDE 75; 50 MG/1; MG/1
TABLET ORAL
Qty: 30 TAB | Refills: 3 | Status: SHIPPED | OUTPATIENT
Start: 2019-03-27 | End: 2019-06-04 | Stop reason: SDUPTHER

## 2019-04-01 ENCOUNTER — APPOINTMENT (OUTPATIENT)
Dept: RADIOLOGY | Facility: MEDICAL CENTER | Age: 59
End: 2019-04-01
Attending: EMERGENCY MEDICINE
Payer: MEDICAID

## 2019-04-01 ENCOUNTER — HOSPITAL ENCOUNTER (EMERGENCY)
Facility: MEDICAL CENTER | Age: 59
End: 2019-04-02
Attending: EMERGENCY MEDICINE
Payer: MEDICAID

## 2019-04-01 DIAGNOSIS — R42 DIZZINESS: ICD-10-CM

## 2019-04-01 DIAGNOSIS — R42 VERTIGO: ICD-10-CM

## 2019-04-01 LAB
ALBUMIN SERPL BCP-MCNC: 4.4 G/DL (ref 3.2–4.9)
ALBUMIN/GLOB SERPL: 1.6 G/DL
ALP SERPL-CCNC: 79 U/L (ref 30–99)
ALT SERPL-CCNC: 20 U/L (ref 2–50)
ANION GAP SERPL CALC-SCNC: 10 MMOL/L (ref 0–11.9)
AST SERPL-CCNC: 18 U/L (ref 12–45)
BASOPHILS # BLD AUTO: 0.4 % (ref 0–1.8)
BASOPHILS # BLD: 0.04 K/UL (ref 0–0.12)
BILIRUB SERPL-MCNC: 0.2 MG/DL (ref 0.1–1.5)
BUN SERPL-MCNC: 18 MG/DL (ref 8–22)
CALCIUM SERPL-MCNC: 9.5 MG/DL (ref 8.5–10.5)
CHLORIDE SERPL-SCNC: 106 MMOL/L (ref 96–112)
CO2 SERPL-SCNC: 26 MMOL/L (ref 20–33)
CREAT SERPL-MCNC: 0.99 MG/DL (ref 0.5–1.4)
EKG IMPRESSION: NORMAL
EOSINOPHIL # BLD AUTO: 0.22 K/UL (ref 0–0.51)
EOSINOPHIL NFR BLD: 2 % (ref 0–6.9)
ERYTHROCYTE [DISTWIDTH] IN BLOOD BY AUTOMATED COUNT: 40.5 FL (ref 35.9–50)
GLOBULIN SER CALC-MCNC: 2.7 G/DL (ref 1.9–3.5)
GLUCOSE SERPL-MCNC: 104 MG/DL (ref 65–99)
HCT VFR BLD AUTO: 40.4 % (ref 37–47)
HGB BLD-MCNC: 13.2 G/DL (ref 12–16)
IMM GRANULOCYTES # BLD AUTO: 0.03 K/UL (ref 0–0.11)
IMM GRANULOCYTES NFR BLD AUTO: 0.3 % (ref 0–0.9)
LYMPHOCYTES # BLD AUTO: 3.24 K/UL (ref 1–4.8)
LYMPHOCYTES NFR BLD: 29.8 % (ref 22–41)
MCH RBC QN AUTO: 29.6 PG (ref 27–33)
MCHC RBC AUTO-ENTMCNC: 32.7 G/DL (ref 33.6–35)
MCV RBC AUTO: 90.6 FL (ref 81.4–97.8)
MONOCYTES # BLD AUTO: 0.71 K/UL (ref 0–0.85)
MONOCYTES NFR BLD AUTO: 6.5 % (ref 0–13.4)
NEUTROPHILS # BLD AUTO: 6.62 K/UL (ref 2–7.15)
NEUTROPHILS NFR BLD: 61 % (ref 44–72)
NRBC # BLD AUTO: 0 K/UL
NRBC BLD-RTO: 0 /100 WBC
PLATELET # BLD AUTO: 245 K/UL (ref 164–446)
PMV BLD AUTO: 10.4 FL (ref 9–12.9)
POTASSIUM SERPL-SCNC: 3.5 MMOL/L (ref 3.6–5.5)
PROT SERPL-MCNC: 7.1 G/DL (ref 6–8.2)
RBC # BLD AUTO: 4.46 M/UL (ref 4.2–5.4)
SODIUM SERPL-SCNC: 142 MMOL/L (ref 135–145)
TROPONIN I SERPL-MCNC: <0.01 NG/ML (ref 0–0.04)
WBC # BLD AUTO: 10.9 K/UL (ref 4.8–10.8)

## 2019-04-01 PROCEDURE — A9270 NON-COVERED ITEM OR SERVICE: HCPCS | Performed by: EMERGENCY MEDICINE

## 2019-04-01 PROCEDURE — 93005 ELECTROCARDIOGRAM TRACING: CPT | Performed by: EMERGENCY MEDICINE

## 2019-04-01 PROCEDURE — 80053 COMPREHEN METABOLIC PANEL: CPT

## 2019-04-01 PROCEDURE — 99284 EMERGENCY DEPT VISIT MOD MDM: CPT

## 2019-04-01 PROCEDURE — 84484 ASSAY OF TROPONIN QUANT: CPT

## 2019-04-01 PROCEDURE — 85025 COMPLETE CBC W/AUTO DIFF WBC: CPT

## 2019-04-01 PROCEDURE — 700102 HCHG RX REV CODE 250 W/ 637 OVERRIDE(OP): Performed by: EMERGENCY MEDICINE

## 2019-04-01 RX ORDER — MECLIZINE HYDROCHLORIDE 25 MG/1
25 TABLET ORAL ONCE
Status: COMPLETED | OUTPATIENT
Start: 2019-04-01 | End: 2019-04-01

## 2019-04-01 RX ADMIN — MECLIZINE HYDROCHLORIDE 25 MG: 25 TABLET ORAL at 22:30

## 2019-04-01 ASSESSMENT — ENCOUNTER SYMPTOMS
DIZZINESS: 1
HEADACHES: 1

## 2019-04-02 VITALS
OXYGEN SATURATION: 100 % | HEIGHT: 65 IN | HEART RATE: 69 BPM | RESPIRATION RATE: 25 BRPM | SYSTOLIC BLOOD PRESSURE: 147 MMHG | BODY MASS INDEX: 30.49 KG/M2 | WEIGHT: 182.98 LBS | DIASTOLIC BLOOD PRESSURE: 63 MMHG | TEMPERATURE: 97.4 F

## 2019-04-02 PROCEDURE — 700117 HCHG RX CONTRAST REV CODE 255: Performed by: EMERGENCY MEDICINE

## 2019-04-02 PROCEDURE — 70498 CT ANGIOGRAPHY NECK: CPT

## 2019-04-02 PROCEDURE — 70496 CT ANGIOGRAPHY HEAD: CPT

## 2019-04-02 PROCEDURE — A9270 NON-COVERED ITEM OR SERVICE: HCPCS | Performed by: EMERGENCY MEDICINE

## 2019-04-02 PROCEDURE — 700102 HCHG RX REV CODE 250 W/ 637 OVERRIDE(OP): Performed by: EMERGENCY MEDICINE

## 2019-04-02 RX ORDER — MECLIZINE HYDROCHLORIDE 25 MG/1
25 TABLET ORAL ONCE
Status: COMPLETED | OUTPATIENT
Start: 2019-04-02 | End: 2019-04-02

## 2019-04-02 RX ORDER — MECLIZINE HYDROCHLORIDE 25 MG/1
25 TABLET ORAL 3 TIMES DAILY PRN
Qty: 30 TAB | Refills: 0 | Status: SHIPPED | OUTPATIENT
Start: 2019-04-02 | End: 2019-04-04

## 2019-04-02 RX ADMIN — MECLIZINE HYDROCHLORIDE 25 MG: 25 TABLET ORAL at 00:36

## 2019-04-02 RX ADMIN — IOHEXOL 100 ML: 350 INJECTION, SOLUTION INTRAVENOUS at 00:03

## 2019-04-02 ASSESSMENT — ENCOUNTER SYMPTOMS
VOMITING: 0
SHORTNESS OF BREATH: 0
FEVER: 0
NAUSEA: 0
SORE THROAT: 0

## 2019-04-02 NOTE — ED PROVIDER NOTES
ED Provider Note    Scribed for Zakiya Fink M.D. by Sal Fu. 4/1/2019, 9:49 PM.    Primary care provider: Tata Gonsalez M.D.  Means of arrival: Walk-In  History obtained from: Patient  History limited by: None    CHIEF COMPLAINT  Chief Complaint   Patient presents with   • Dizziness   • Headache       HPI  Anali Tellez is a 58 y.o. Female, with a history of hypertension and COPD, who presents to the Emergency Department evaluation of dizziness which began 6 days ago. She states she went to bed last night after taking her medications and she felt like the room was spinning around her. The dizziness is exacerbated with any head motion and improved when she lies flat. She reports associated mild generalized throbbing headaches. She denies any chest pain and shortness of breath. She denies any recent illness. She denies any heart problems. She takes pain medications regularly to treat chronic back pain.      REVIEW OF SYSTEMS  Review of Systems   Constitutional: Negative for fever.   HENT: Negative for sore throat.    Respiratory: Negative for shortness of breath.    Cardiovascular: Negative for chest pain.   Gastrointestinal: Negative for nausea and vomiting.   Neurological: Positive for dizziness and headaches.   All other systems reviewed and are negative.    PAST MEDICAL HISTORY   has a past medical history of Chronic obstructive pulmonary disease (HCC); Hypertension; and Low back pain.    SURGICAL HISTORY  patient denies any surgical history    SOCIAL HISTORY  Social History   Substance Use Topics   • Smoking status: Current Every Day Smoker     Packs/day: 0.25     Years: 32.00     Types: Cigarettes   • Smokeless tobacco: Never Used   • Alcohol use 3.6 oz/week     6 Cans of beer per week      Comment: history of alcoholism, stopped drinking heavily at 28      History   Drug Use No     FAMILY HISTORY  Family History   Problem Relation Age of Onset   • Diabetes Mother    • Hypertension  "Mother    • Heart Disease Mother         78   • Heart Disease Father         82   • Diabetes Sister    • Hypertension Sister    • No Known Problems Brother    • Hypertension Sister    • Cancer Neg Hx      CURRENT MEDICATIONS  Home Medications    Albuterol, baclofen,        ALLERGIES  No Known Allergies    PHYSICAL EXAM  VITAL SIGNS: /63   Pulse 65   Temp 36.3 °C (97.4 °F) (Temporal)   Resp 16   Ht 1.651 m (5' 5\")   Wt 83 kg (182 lb 15.7 oz)   LMP 05/19/2010   SpO2 98%   BMI 30.45 kg/m²   Vitals reviewed by myself.  Physical Exam   Nursing note and vitals reviewed.  Constitutional: Well-developed and well-nourished. No distress.   HENT: Head is normocephalic and atraumatic. Oropharynx is clear and moist without exudate or erythema.   Eyes: Pupils are equal, round, and reactive to light. No horizontal or vertical nystagmus. Conjunctiva are normal.   Cardiovascular: Normal rate and regular rhythm. No murmur heard. Normal radial pulses.  Pulmonary/Chest: Breath sounds normal. No wheezes or rales.   Abdominal: Soft and non-tender. No distention.    Musculoskeletal: Extremities exhibit normal range of motion without edema or tenderness. Patient ambulates with a normal narrow-based steady gait.   Neurological: Awake, alert and oriented to person, place, and time. No focal deficits noted. Cranial nerves II - XII intact. No pronator drift.  No dysmetria on cerebellar testing. Normal speech and language. Normal strength and sensation in bilateral upper and lower extremities.   Skin: Skin is warm and dry. No rash.   Psychiatric: Normal mood and affect. Appropriate for clinical situation.      DIAGNOSTIC STUDIES /  LABS  Labs Reviewed   CBC WITH DIFFERENTIAL - Abnormal; Notable for the following:        Result Value    WBC 10.9 (*)     MCHC 32.7 (*)     All other components within normal limits   COMP METABOLIC PANEL - Abnormal; Notable for the following:     Potassium 3.5 (*)     Glucose 104 (*)     All other " components within normal limits   ESTIMATED GFR - Abnormal; Notable for the following:     GFR If Non  57 (*)     All other components within normal limits   TROPONIN       All labs reviewed by me.    EKG Interpretation:  Interpreted by myself    12 Lead EKG interpreted by me to show:  EKG at 2040: Normal sinus rhythm, heart rate 68, normal axis, normal intervals, , QRS 9 0, QTc 434, no acute ST-T segment changes, nonspecific T wave inversions in diffuse leads, no evidence of acute arrhythmia or ischemia  My impression of this EKG: Does not indicate ischemia or arrhythmia at this time.    RADIOLOGY  CT-CTA NECK WITH & W/O-POST PROCESSING   Final Result         1.  CT angiogram of the neck within normal limits.      CT-CTA HEAD WITH & W/O-POST PROCESS   Final Result         1.  Hypoplastic right A1 segment.   2.  Persistent fetal morphology of the right posterior cerebral artery is small contribution from the basilar artery, otherwise CT angiogram of the [place] within normal limits.        The radiologist's interpretation of all radiological studies have been reviewed by me.      REASSESSMENT    9:56 PM - I evaluated the patient at bedside and discussed diagnostic results and patient's plan.    COURSE & MEDICAL DECISION MAKING  Nursing notes, VS, PMSFHx reviewed in chart.    Patient is a 58-year-old female who comes in for dizziness.  Differential diagnosis includes electrolyte abnormality, arrhythmia, acute coronary syndrome, intracranial abnormality.  Diagnostic workup includes labs, EKG, and CTA of the head and neck.    Patient's initial vitals are within normal limits.  She is neurologically intact on exam and has no dysmetria and can ambulate with a normal narrow base steady gait.  Her symptoms of positional vertigo are most consistent with benign positional vertigo.  She is treated with meclizine with minimal relief of her symptoms.  EKG returns and demonstrates no evidence of  acute arrhythmia or ischemia.  Labs returned and are unremarkable.  CT of the head demonstrates no acute abnormalities.  CT of the neck demonstrates no acute abnormalities.  As patient is feeling improved and able to ambulate with a narrow base steady gait I will have her follow-up with her PCP and start her on meclizine for likely benign positional vertigo.  If she has any persistent or worsening symptoms she should return to the emergency department.  Patient understands and is agreeable to this plan.  She is then given strict return precautions and discharged home in stable condition.      FINAL IMPRESSION  1. Dizziness    2. Vertigo         Sal WHITE (Scribe), omar scribing for, and in the presence of, Zakiya Fink M.D.     Electronically signed by: Sal Fu (Billy), 4/1/2019     Zakiya WHITE M.D. personally performed the services described in this documentation, as scribed by Sal Fu in my presence, and it is both accurate and complete.    C      The note accurately reflects work and decisions made by me.  Zakiya Fink  4/2/2019  12:38 AM

## 2019-04-02 NOTE — ED PROVIDER NOTES
ED Provider Note    Scribed for Zakiya Fink M.D. by Sal Fu. 4/1/2019, 9:49 PM.    Primary care provider: Tata Gonsalez M.D.  Means of arrival: Walk-In  History obtained from: Patient  History limited by: None    CHIEF COMPLAINT  Chief Complaint   Patient presents with   • Dizziness   • Headache       HPI  Anali Tellez is a 58 y.o. Female, with a history of hypertension and COPD, who presents to the Emergency Department evaluation of dizziness which began 6 days ago. She states she went to bed last night after taking her medications and she felt like the room was spinning around her. The dizziness is exacerbated with any head motion. She reports associated headaches. She denies any chest pain and shortness of breath. She denies any recent illness. She denies any heart problems. She takes pain medications regularly to treat chronic back pain.      REVIEW OF SYSTEMS  Review of Systems   Neurological: Positive for dizziness and headaches.     PAST MEDICAL HISTORY   has a past medical history of Chronic obstructive pulmonary disease (HCC); Hypertension; and Low back pain.    SURGICAL HISTORY  patient denies any surgical history    SOCIAL HISTORY  Social History   Substance Use Topics   • Smoking status: Current Every Day Smoker     Packs/day: 0.25     Years: 32.00     Types: Cigarettes   • Smokeless tobacco: Never Used   • Alcohol use 3.6 oz/week     6 Cans of beer per week      Comment: history of alcoholism, stopped drinking heavily at 28      History   Drug Use No     FAMILY HISTORY  Family History   Problem Relation Age of Onset   • Diabetes Mother    • Hypertension Mother    • Heart Disease Mother         78   • Heart Disease Father         82   • Diabetes Sister    • Hypertension Sister    • No Known Problems Brother    • Hypertension Sister    • Cancer Neg Hx      CURRENT MEDICATIONS  Home Medications    **Home medications have not yet been reviewed for this encounter**    "    ALLERGIES  No Known Allergies    PHYSICAL EXAM  VITAL SIGNS: /63   Pulse 65   Temp 36.3 °C (97.4 °F) (Temporal)   Resp 16   Ht 1.651 m (5' 5\")   Wt 83 kg (182 lb 15.7 oz)   LMP 05/19/2010   SpO2 98%   BMI 30.45 kg/m²   Vitals reviewed by myself.  Physical Exam   Nursing note and vitals reviewed.  Constitutional: Well-developed and well-nourished. No distress.   HENT: Head is normocephalic and atraumatic. Oropharynx is clear and moist without exudate or erythema.   Eyes: Pupils are equal, round, and reactive to light. No horizontal or vertical nystagmus. Conjunctiva are normal.   Cardiovascular: Normal rate and regular rhythm. No murmur heard. Normal radial pulses.  Pulmonary/Chest: Breath sounds normal. No wheezes or rales.   Abdominal: Soft and non-tender. No distention.    Musculoskeletal: Extremities exhibit normal range of motion without edema or tenderness. Patient ambulates with a normal narrow-based steady gait.   Neurological: Awake, alert and oriented to person, place, and time. No focal deficits noted. Cranial nerves II - XII intact. No pronator drift.  No dysmetria on cerebellar testing. Normal speech and language. Normal strength and sensation in bilateral upper and lower extremities.   Skin: Skin is warm and dry. No rash.   Psychiatric: Normal mood and affect. Appropriate for clinical situation.      DIAGNOSTIC STUDIES /  LABS  Labs Reviewed   CBC WITH DIFFERENTIAL   COMP METABOLIC PANEL   TROPONIN       All labs reviewed by me.    EKG Interpretation:  Interpreted by myself    12 Lead EKG interpreted by me to show:  Time: ***  Normal sinus rhythm  Rate ***  Axis: Normal  Intervals: Normal  Normal T waves  Normal ST segments  My impression of this EKG: Does not indicate ischemia or arrhythmia at this time.    RADIOLOGY  CT-CTA HEAD WITH & W/O-POST PROCESS    (Results Pending)   CT-CTA NECK WITH & W/O-POST PROCESSING    (Results Pending)     The radiologist's interpretation of all " radiological studies have been reviewed by me.    PROCEDURES  ***    REASSESSMENT    9:56 PM - I evaluated the patient at bedside and discussed diagnostic results and patient's plan.    COURSE & MEDICAL DECISION MAKING  Nursing notes, VS, PMSFHx reviewed in chart.    Patient is a ***      FINAL IMPRESSION  No diagnosis found.     Sal WHITE (Scribe), am scribing for, and in the presence of, Zakyia Fink M.D.     Electronically signed by: Sal Fu (Scribe), 4/1/2019     IZakiya M.D. personally performed the services described in this documentation, as scribed by Sla Fu in my presence, and it is both accurate and complete.    C      {ERP Attestation (ERP ONLY):220205}

## 2019-04-02 NOTE — ED TRIAGE NOTES
Chief Complaint   Patient presents with   • Dizziness   • Headache     Symptoms began 3 days ago.  states that she feels like the room is spinning around.  Increases when she moves her head.  No recent illness.  Triage process explained to patient.  Pt back to waiting room.  Pt instructed to inform RN if any changes or questions arise.

## 2019-04-02 NOTE — ED NOTES
Pt ambulated from trauma bay to [facility] with well balanced gait. Denies dizziness

## 2019-04-04 ENCOUNTER — OFFICE VISIT (OUTPATIENT)
Dept: MEDICAL GROUP | Facility: MEDICAL CENTER | Age: 59
End: 2019-04-04
Attending: INTERNAL MEDICINE
Payer: MEDICAID

## 2019-04-04 VITALS
HEIGHT: 65 IN | DIASTOLIC BLOOD PRESSURE: 88 MMHG | WEIGHT: 175 LBS | RESPIRATION RATE: 16 BRPM | HEART RATE: 86 BPM | BODY MASS INDEX: 29.16 KG/M2 | OXYGEN SATURATION: 100 % | TEMPERATURE: 97.9 F | SYSTOLIC BLOOD PRESSURE: 130 MMHG

## 2019-04-04 DIAGNOSIS — H81.11 BENIGN PAROXYSMAL POSITIONAL VERTIGO OF RIGHT EAR: ICD-10-CM

## 2019-04-04 DIAGNOSIS — R21 RASH: ICD-10-CM

## 2019-04-04 PROBLEM — R42 DIZZINESS: Status: ACTIVE | Noted: 2019-04-04

## 2019-04-04 PROBLEM — J06.9 VIRAL URI: Status: RESOLVED | Noted: 2017-04-20 | Resolved: 2019-04-04

## 2019-04-04 PROCEDURE — 99213 OFFICE O/P EST LOW 20 MIN: CPT | Performed by: INTERNAL MEDICINE

## 2019-04-04 PROCEDURE — 99214 OFFICE O/P EST MOD 30 MIN: CPT | Performed by: INTERNAL MEDICINE

## 2019-04-04 RX ORDER — DIPHENHYDRAMINE HCL 25 MG
CAPSULE ORAL
Qty: 30 CAP | Refills: 0 | Status: SHIPPED | OUTPATIENT
Start: 2019-04-04 | End: 2019-06-04

## 2019-04-04 ASSESSMENT — PAIN SCALES - GENERAL: PAINLEVEL: NO PAIN

## 2019-04-04 NOTE — ASSESSMENT & PLAN NOTE
Patient reports that starting this past Sunday, she woke up feeling very dizzy.  She felt as if the room was spinning.  She also had nausea but no vomiting.  She was recently sick with a viral upper respiratory infection.  She denies any recent head trauma.  She was seen in the ER and underwent a CT head and neck which came back normal.  Lab work came back normal and she was told this was vertigo.  She was given a prescription for meclizine.  States she has been taking 25 mg 3 times daily for the past 4 days without improvement in her symptoms.  Since time of onset.  She reports slight improvement in dizziness.  She denies vision changes, ear pain, headaches.

## 2019-04-05 NOTE — ASSESSMENT & PLAN NOTE
She continues to have the same itchy rash over both of her hands with skin thickening.  She reports using the triamcinolone cream recently and states this made it worse.  She believes that the hydrocortisone cream works better for her and she is requesting a refill on this.

## 2019-04-05 NOTE — PROGRESS NOTES
Subjective:   Anali Tellez is a 58 y.o. female here today for emergency room follow-up for dizziness, follow-up rash    Benign paroxysmal positional vertigo of right ear  Patient reports that starting this past Sunday, she woke up feeling very dizzy.  She felt as if the room was spinning.  She also had nausea but no vomiting.  She was recently sick with a viral upper respiratory infection.  She denies any recent head trauma.  She was seen in the ER and underwent a CT head and neck which came back normal.  Lab work came back normal and she was told this was vertigo.  She was given a prescription for meclizine.  States she has been taking 25 mg 3 times daily for the past 4 days without improvement in her symptoms.  Since time of onset.  She reports slight improvement in dizziness.  She denies vision changes, ear pain, headaches.    Rash  She continues to have the same itchy rash over both of her hands with skin thickening.  She reports using the triamcinolone cream recently and states this made it worse.  She believes that the hydrocortisone cream works better for her and she is requesting a refill on this.       Current medicines (including changes today)  Current Outpatient Prescriptions   Medication Sig Dispense Refill   • hydrocortisone 2.5 % Cream topical cream Apply thin layer to rash on hands as needed up to twice daily.  To replace kenalog 60 g 2   • diphenhydrAMINE (BENADRYL) 25 MG capsule Take 1-2 tabs every 6 hrs if needed for dizziness 30 Cap 0   • raNITidine (ZANTAC) 150 MG Tab TAKE 1 TABLET ORALLY 2 TIMES DAILY IF NEEDED FOR HEARTBURN WITH MEALS 60 Tab 3   • triamterene-hydrochlorothiazide (MAXZIDE 75-50) 75-50 MG Tab TAKE 1 TABLET ORALLY EVERY MORNING 30 Tab 3   • gabapentin (NEURONTIN) 300 MG Cap TAKE 1 CAPSULE ORALLY NIGHTLY AT BEDTIME 30 Cap 3   • SPIRIVA RESPIMAT 2.5 MCG/ACT Aero Soln INHALE 2 PUFFS ORALLY ONCE DAILY 4 g 5   • methocarbamol (ROBAXIN) 500 MG Tab TAKE 1 TO 2 TABLETS ORALLY 3  TIMES DAILY IF NEEDED FOR MUSCLE SPASM 90 Tab 3   • budesonide-formoterol (SYMBICORT) 160-4.5 MCG/ACT Aerosol INHALE 2 PUFFS ORALLY 2 TIMES DAILY 1 Inhaler 3   • albuterol (PROVENTIL) 2.5mg/3ml Nebu Soln solution for nebulization 2.5 mg by Nebulization route every four hours as needed for Shortness of Breath.     • albuterol 108 (90 BASE) MCG/ACT Aero Soln inhalation aerosol Inhale 2 Puffs by mouth every 6 hours as needed for Shortness of Breath. 8.5 g 3     No current facility-administered medications for this visit.      She  has a past medical history of Chronic obstructive pulmonary disease (HCC); Hypertension; and Low back pain.    ROS   Denies chest pain, shortness of breath  As above in HPI     Objective:     Vitals:    04/04/19 1410   BP: 130/88   Pulse: 86   Resp: 16   Temp: 36.6 °C (97.9 °F)   SpO2: 100%     Body mass index is 29.12 kg/m².   Physical Exam:  Constitutional: Alert, no distress.  Skin: Warm, dry, good turgor, skin over dorsal and palmar aspects of both hands is hyperpigmented with some thickening.  There are small papules over the thumb and first finger on the right hand which is the most severe.  There are no cracked or bleeding areas..  Eye: Equal, round and reactive, conjunctiva clear, lids normal.  Psych: Alert and oriented x3, normal affect and mood.  Neuro: Positive [place]-Hallpike maneuver on right, negative on left    Results and Imaging:   Reviewed normal CT head and CT neck as well as normal CMP and CBC and troponin    Assessment and Plan:   The following treatment plan was discussed    1. Benign paroxysmal positional vertigo of right ear  She continues to have symptomatic vertigo with mild improvement.  Suspect this was triggered by her recent upper respiratory infection.  She did not respond to the meclizine but she states that when this is happened in the past she has responded to diphenhydramine.  I have given her prescription today.  - diphenhydrAMINE (BENADRYL) 25 MG capsule;  Take 1-2 tabs every 6 hrs if needed for dizziness  Dispense: 30 Cap; Refill: 0    2. Rash  Has slightly worsened although overall looks better controlled than I have seen in the past.  She thinks hydrocortisone works better for her so I have refilled this and she will use this instead of the triamcinolone.  - hydrocortisone 2.5 % Cream topical cream; Apply thin layer to rash on hands as needed up to twice daily.  To replace kenalog  Dispense: 60 g; Refill: 2        Followup: Return in about 6 weeks (around 5/16/2019), or if symptoms worsen or fail to improve, for chronic problems.

## 2019-04-17 RX ORDER — METHOCARBAMOL 500 MG/1
TABLET, FILM COATED ORAL
Qty: 30 TAB | Refills: 2 | Status: SHIPPED | OUTPATIENT
Start: 2019-04-17 | End: 2019-06-04 | Stop reason: SDUPTHER

## 2019-05-20 DIAGNOSIS — M54.42 CHRONIC BILATERAL LOW BACK PAIN WITH LEFT-SIDED SCIATICA: ICD-10-CM

## 2019-05-20 DIAGNOSIS — G89.29 CHRONIC BILATERAL LOW BACK PAIN WITH LEFT-SIDED SCIATICA: ICD-10-CM

## 2019-05-21 RX ORDER — GABAPENTIN 300 MG/1
CAPSULE ORAL
Qty: 30 CAP | Refills: 6 | Status: SHIPPED | OUTPATIENT
Start: 2019-05-21 | End: 2019-06-04 | Stop reason: SDUPTHER

## 2019-06-04 ENCOUNTER — OFFICE VISIT (OUTPATIENT)
Dept: MEDICAL GROUP | Facility: MEDICAL CENTER | Age: 59
End: 2019-06-04
Attending: INTERNAL MEDICINE
Payer: MEDICAID

## 2019-06-04 VITALS
DIASTOLIC BLOOD PRESSURE: 70 MMHG | SYSTOLIC BLOOD PRESSURE: 118 MMHG | TEMPERATURE: 97.9 F | RESPIRATION RATE: 16 BRPM | BODY MASS INDEX: 29.66 KG/M2 | HEART RATE: 88 BPM | HEIGHT: 65 IN | OXYGEN SATURATION: 98 % | WEIGHT: 178 LBS

## 2019-06-04 DIAGNOSIS — M54.42 CHRONIC BILATERAL LOW BACK PAIN WITH LEFT-SIDED SCIATICA: ICD-10-CM

## 2019-06-04 DIAGNOSIS — Z12.11 SCREEN FOR COLON CANCER: ICD-10-CM

## 2019-06-04 DIAGNOSIS — Z78.0 POSTMENOPAUSAL: ICD-10-CM

## 2019-06-04 DIAGNOSIS — E66.9 OBESITY (BMI 30-39.9): ICD-10-CM

## 2019-06-04 DIAGNOSIS — J44.9 COPD WITHOUT EXACERBATION (HCC): ICD-10-CM

## 2019-06-04 DIAGNOSIS — R73.03 PRE-DIABETES: ICD-10-CM

## 2019-06-04 DIAGNOSIS — I10 ESSENTIAL HYPERTENSION: ICD-10-CM

## 2019-06-04 DIAGNOSIS — Z13.220 SCREENING, LIPID: ICD-10-CM

## 2019-06-04 DIAGNOSIS — L30.8 OTHER ECZEMA: ICD-10-CM

## 2019-06-04 DIAGNOSIS — G89.29 CHRONIC BILATERAL LOW BACK PAIN WITH LEFT-SIDED SCIATICA: ICD-10-CM

## 2019-06-04 PROBLEM — R06.02 SHORTNESS OF BREATH: Status: RESOLVED | Noted: 2018-02-08 | Resolved: 2019-06-04

## 2019-06-04 PROBLEM — H81.11 BENIGN PAROXYSMAL POSITIONAL VERTIGO OF RIGHT EAR: Status: RESOLVED | Noted: 2019-04-04 | Resolved: 2019-06-04

## 2019-06-04 PROCEDURE — 99212 OFFICE O/P EST SF 10 MIN: CPT | Performed by: INTERNAL MEDICINE

## 2019-06-04 PROCEDURE — 99214 OFFICE O/P EST MOD 30 MIN: CPT | Performed by: INTERNAL MEDICINE

## 2019-06-04 RX ORDER — TRIAMTERENE AND HYDROCHLOROTHIAZIDE 75; 50 MG/1; MG/1
TABLET ORAL
Qty: 30 TAB | Refills: 5 | Status: SHIPPED | OUTPATIENT
Start: 2019-06-04 | End: 2019-12-24

## 2019-06-04 RX ORDER — BUDESONIDE AND FORMOTEROL FUMARATE DIHYDRATE 160; 4.5 UG/1; UG/1
AEROSOL RESPIRATORY (INHALATION)
Qty: 1 INHALER | Refills: 3 | Status: SHIPPED | OUTPATIENT
Start: 2019-06-04 | End: 2019-12-03 | Stop reason: SDUPTHER

## 2019-06-04 RX ORDER — HYDROCODONE BITARTRATE AND ACETAMINOPHEN 5; 325 MG/1; MG/1
1 TABLET ORAL EVERY 4 HOURS PRN
COMMUNITY
End: 2020-12-23

## 2019-06-04 RX ORDER — GABAPENTIN 300 MG/1
CAPSULE ORAL
Qty: 30 CAP | Refills: 6 | Status: SHIPPED | OUTPATIENT
Start: 2019-06-04 | End: 2019-08-07 | Stop reason: SDUPTHER

## 2019-06-04 RX ORDER — METHOCARBAMOL 500 MG/1
TABLET, FILM COATED ORAL
Qty: 30 TAB | Refills: 5 | Status: SHIPPED | OUTPATIENT
Start: 2019-06-04 | End: 2019-09-09 | Stop reason: SDUPTHER

## 2019-06-04 ASSESSMENT — PAIN SCALES - GENERAL: PAINLEVEL: 4=SLIGHT-MODERATE PAIN

## 2019-06-04 NOTE — ASSESSMENT & PLAN NOTE
She states that the hydrocortisone cream has been working well for her hands and she has not had any flareups of her eczema lately.

## 2019-06-04 NOTE — ASSESSMENT & PLAN NOTE
She complains of difficulty with weight loss.  She states that she eats 1 meal a day.  States she has been this way all of her life and does not usually get hungry.  She walks 1 mile about once a week but is otherwise inactive.

## 2019-06-04 NOTE — ASSESSMENT & PLAN NOTE
Her last hemoglobin A1c was checked approximately 2 years ago and was mildly elevated at 5.7.  She has not had blood work done that was ordered about a year ago for recheck but she is concerned now that she might have diabetes after talking to her pain management doctor.  She denies polyuria and polydipsia.

## 2019-06-04 NOTE — ASSESSMENT & PLAN NOTE
She continues on the Symbicort and Spiriva.  She states that her symptoms are well controlled on this regimen.

## 2019-06-04 NOTE — PROGRESS NOTES
Subjective:   Anali Tellez is a 58 y.o. female here today for medication refills, follow-up chronic medical problems as below, requesting diabetes testing    Pre-diabetes  Her last hemoglobin A1c was checked approximately 2 years ago and was mildly elevated at 5.7.  She has not had blood work done that was ordered about a year ago for recheck but she is concerned now that she might have diabetes after talking to her pain management doctor.  She denies polyuria and polydipsia.    Eczema  She states that the hydrocortisone cream has been working well for her hands and she has not had any flareups of her eczema lately.    HTN (hypertension)  She continues on the triamterene-HCTZ 75-50.  Blood pressure remains well controlled at 118/70.    Chronic bilateral low back pain with left-sided sciatica  She has been working with Dr. Bunch through [facility].  She continues on her hydrocodone 5/325.  She is also taking Robaxin 500 mg if needed and gabapentin 300 mg at night and she is requesting refills on these meds today.  She reports that they are helpful for her.    Obesity (BMI 30-39.9)  She complains of difficulty with weight loss.  She states that she eats 1 meal a day.  States she has been this way all of her life and does not usually get hungry.  She walks 1 mile about once a week but is otherwise inactive.    COPD without exacerbation (CMS-HCC)  She continues on the Symbicort and Spiriva.  She states that her symptoms are well controlled on this regimen.       Current medicines (including changes today)  Current Outpatient Prescriptions   Medication Sig Dispense Refill   • HYDROcodone-acetaminophen (NORCO) 5-325 MG Tab per tablet Take 1 Tab by mouth every four hours as needed.     • Tiotropium Bromide Monohydrate (SPIRIVA RESPIMAT) 2.5 MCG/ACT Aero Soln Take 2 Puffs by mouth every day. 4 g 5   • budesonide-formoterol (SYMBICORT) 160-4.5 MCG/ACT Aerosol INHALE 2 PUFFS ORALLY 2 TIMES DAILY 1 Inhaler 3   •  gabapentin (NEURONTIN) 300 MG Cap TAKE 1 CAPSULE ORALLY NIGHTLY AT BEDTIME 30 Cap 6   • methocarbamol (ROBAXIN) 500 MG Tab TAKE 1 TO 2 TABLETS ORALLY 3 TIMES DAILY IF NEEDED FOR MUSCLE SPASM 30 Tab 5   • triamterene-hydrochlorothiazide (MAXZIDE 75-50) 75-50 MG Tab TAKE 1 TABLET ORALLY EVERY MORNING 30 Tab 5   • hydrocortisone 2.5 % Cream topical cream Apply thin layer to rash on hands as needed up to twice daily.  To replace kenalog 60 g 2   • raNITidine (ZANTAC) 150 MG Tab TAKE 1 TABLET ORALLY 2 TIMES DAILY IF NEEDED FOR HEARTBURN WITH MEALS 60 Tab 3   • albuterol (PROVENTIL) 2.5mg/3ml Nebu Soln solution for nebulization 2.5 mg by Nebulization route every four hours as needed for Shortness of Breath.     • albuterol 108 (90 BASE) MCG/ACT Aero Soln inhalation aerosol Inhale 2 Puffs by mouth every 6 hours as needed for Shortness of Breath. 8.5 g 3     No current facility-administered medications for this visit.      She  has a past medical history of Chronic obstructive pulmonary disease (HCC); Hypertension; and Low back pain.    ROS   Denies chest pain, shortness of breath  As above in HPI     Objective:     Vitals:    06/04/19 0922   BP: 118/70   Pulse: 88   Resp: 16   Temp: 36.6 °C (97.9 °F)   SpO2: 98%     Body mass index is 29.62 kg/m².   Physical Exam:  Constitutional: Alert, no distress.  Skin: Warm, dry, good turgorwithout raised papular lesions,hyperpigmented areas of both hands   Eye: Equal, round and reactive, conjunctiva clear, lids normal.  ENMT: Lips without lesions, good dentition, oropharynx clear, TM's clear bilaterally  Neck: Trachea midline, no masses, no thyromegaly. No cervical or supraclavicular lymphadenopathy  Respiratory: Unlabored respiratory effort, lungs clear to auscultation, no wheezes, no ronchi.  Cardiovascular: Regular rate and rhythm, no murmurs appreciated, no lower extremity edema  Psych: Alert and oriented x3, normal affect and mood.      Assessment and Plan:   The following  treatment plan was discussed    1. Pre-diabetes  We will obtain updated labs  - HEMOGLOBIN A1C; Future    2. Essential hypertension  Stable, continue current medications with updated labs as below  - Comp Metabolic Panel; Future  - triamterene-hydrochlorothiazide (MAXZIDE 75-50) 75-50 MG Tab; TAKE 1 TABLET ORALLY EVERY MORNING  Dispense: 30 Tab; Refill: 5    3. Screening, lipid  - Lipid Profile; Future    4. Screen for colon cancer  - OCCULT BLOOD FECES IMMUNOASSAY; Future    5. Chronic bilateral low back pain with left-sided sciatica  Stable, controlled with current medications as well as her hydrocodone which she obtains through pain management.  She will continue follow-up with them as they are also working with her for trigger point injections and epidurals.  -Continue follow-up with pain management, pain medication through them  - gabapentin (NEURONTIN) 300 MG Cap; TAKE 1 CAPSULE ORALLY NIGHTLY AT BEDTIME  Dispense: 30 Cap; Refill: 6  - methocarbamol (ROBAXIN) 500 MG Tab; TAKE 1 TO 2 TABLETS ORALLY 3 TIMES DAILY IF NEEDED FOR MUSCLE SPASM  Dispense: 30 Tab; Refill: 5    6. Postmenopausal  Not currently on any supplementation  - VITAMIN D,25 HYDROXY; Future    7. Other eczema  Controlled with hydrocortisone cream.  We will continue use as needed    8. Obesity (BMI 30-39.9)  We discussed increasing her exercise and mobility.  We also discussed eating several small meals in a day as opposed to one large meal to help improve her metabolism.    9. COPD without exacerbation (HCC)  Stable, well-controlled with current meds which were refilled today  - Tiotropium Bromide Monohydrate (SPIRIVA RESPIMAT) 2.5 MCG/ACT Aero Soln; Take 2 Puffs by mouth every day.  Dispense: 4 g; Refill: 5  - budesonide-formoterol (SYMBICORT) 160-4.5 MCG/ACT Aerosol; INHALE 2 PUFFS ORALLY 2 TIMES DAILY  Dispense: 1 Inhaler; Refill: 3  -She was counseled on smoking cessation but she states she is not ready to quit      With regards to her  health maintenance, she declines Pap and mammogram at this time.  She is agreeable to FIT testing which was provided today.  She declines Tdap.      Followup: Return in about 4 months (around 10/4/2019), or if symptoms worsen or fail to improve.

## 2019-06-04 NOTE — ASSESSMENT & PLAN NOTE
She has been working with Dr. Bunch through [facility].  She continues on her hydrocodone 5/325.  She is also taking Robaxin 500 mg if needed and gabapentin 300 mg at night and she is requesting refills on these meds today.  She reports that they are helpful for her.

## 2019-06-06 RX ORDER — RANITIDINE 150 MG/1
TABLET ORAL
Qty: 60 TAB | Refills: 0 | Status: CANCELLED | OUTPATIENT
Start: 2019-06-06

## 2019-06-11 DIAGNOSIS — E55.9 VITAMIN D DEFICIENCY: ICD-10-CM

## 2019-06-11 DIAGNOSIS — R73.03 PRE-DIABETES: ICD-10-CM

## 2019-06-11 RX ORDER — ERGOCALCIFEROL 1.25 MG/1
50000 CAPSULE ORAL
Qty: 4 CAP | Refills: 5 | Status: SHIPPED | OUTPATIENT
Start: 2019-06-11 | End: 2020-12-23 | Stop reason: SDUPTHER

## 2019-08-05 ENCOUNTER — TELEPHONE (OUTPATIENT)
Dept: MEDICAL GROUP | Facility: MEDICAL CENTER | Age: 59
End: 2019-08-05

## 2019-08-05 DIAGNOSIS — G89.29 CHRONIC BILATERAL LOW BACK PAIN WITH LEFT-SIDED SCIATICA: ICD-10-CM

## 2019-08-05 DIAGNOSIS — M54.42 CHRONIC BILATERAL LOW BACK PAIN WITH LEFT-SIDED SCIATICA: ICD-10-CM

## 2019-08-05 NOTE — TELEPHONE ENCOUNTER
Patient came into the office and requested to have the dosage on her gabapentin increased. Patient stated that she was given the a-okay from her physical therapist to do so.

## 2019-08-07 RX ORDER — GABAPENTIN 300 MG/1
CAPSULE ORAL
Qty: 60 CAP | Refills: 6 | Status: SHIPPED | OUTPATIENT
Start: 2019-08-07 | End: 2019-08-08 | Stop reason: SDUPTHER

## 2019-08-07 NOTE — TELEPHONE ENCOUNTER
I currently have her taking gabapentin 300 mg 1 tablet at night.  There are a couple of options for increasing the dose.  She can either add on a daytime dose if it does not make her too drowsy, or we can go up to 2 tablets at night.  I will send a prescription for the increased quantity.  Please let her know that she can do either of these 2 options.    Tata Gonsalez M.D.

## 2019-08-08 RX ORDER — GABAPENTIN 300 MG/1
300 CAPSULE ORAL 3 TIMES DAILY PRN
Qty: 90 CAP | Refills: 6 | Status: SHIPPED | OUTPATIENT
Start: 2019-08-08 | End: 2020-06-15

## 2019-08-08 NOTE — TELEPHONE ENCOUNTER
If this is the case then I will change the prescription to reflect 3 times daily dosing.  She continues to take 1 dose in the noontime if she feels like she needs it or she can add an extra pill to her nighttime dose.    Tata Gonsalez M.D.

## 2019-08-09 RX ORDER — RANITIDINE 150 MG/1
TABLET ORAL
Qty: 60 TAB | Refills: 5 | Status: SHIPPED | OUTPATIENT
Start: 2019-08-09 | End: 2020-12-23

## 2019-09-09 DIAGNOSIS — G89.29 CHRONIC BILATERAL LOW BACK PAIN WITH LEFT-SIDED SCIATICA: ICD-10-CM

## 2019-09-09 DIAGNOSIS — M54.42 CHRONIC BILATERAL LOW BACK PAIN WITH LEFT-SIDED SCIATICA: ICD-10-CM

## 2019-09-09 RX ORDER — METHOCARBAMOL 500 MG/1
TABLET, FILM COATED ORAL
Qty: 30 TAB | Refills: 4 | Status: SHIPPED | OUTPATIENT
Start: 2019-09-09 | End: 2019-10-02 | Stop reason: SDUPTHER

## 2019-10-02 ENCOUNTER — OFFICE VISIT (OUTPATIENT)
Dept: MEDICAL GROUP | Facility: MEDICAL CENTER | Age: 59
End: 2019-10-02
Attending: INTERNAL MEDICINE
Payer: MEDICAID

## 2019-10-02 VITALS
SYSTOLIC BLOOD PRESSURE: 128 MMHG | BODY MASS INDEX: 30.16 KG/M2 | WEIGHT: 181 LBS | RESPIRATION RATE: 16 BRPM | DIASTOLIC BLOOD PRESSURE: 80 MMHG | HEART RATE: 84 BPM | TEMPERATURE: 97.9 F | HEIGHT: 65 IN | OXYGEN SATURATION: 98 %

## 2019-10-02 DIAGNOSIS — L03.119 CELLULITIS OF UPPER EXTREMITY, UNSPECIFIED LATERALITY: ICD-10-CM

## 2019-10-02 DIAGNOSIS — L30.8 OTHER ECZEMA: ICD-10-CM

## 2019-10-02 PROBLEM — R21 RASH: Status: RESOLVED | Noted: 2017-07-26 | Resolved: 2019-10-02

## 2019-10-02 PROCEDURE — 99214 OFFICE O/P EST MOD 30 MIN: CPT | Performed by: INTERNAL MEDICINE

## 2019-10-02 PROCEDURE — 99212 OFFICE O/P EST SF 10 MIN: CPT | Performed by: INTERNAL MEDICINE

## 2019-10-02 RX ORDER — SULFAMETHOXAZOLE AND TRIMETHOPRIM 800; 160 MG/1; MG/1
1 TABLET ORAL 2 TIMES DAILY
Qty: 14 TAB | Refills: 0 | Status: SHIPPED | OUTPATIENT
Start: 2019-10-02 | End: 2019-10-09

## 2019-10-02 RX ORDER — METHOCARBAMOL 500 MG/1
500 TABLET, FILM COATED ORAL
Qty: 30 TAB | Refills: 5 | Status: SHIPPED | OUTPATIENT
Start: 2019-10-02 | End: 2019-11-20 | Stop reason: SDUPTHER

## 2019-10-02 RX ORDER — CEPHALEXIN 500 MG/1
500 CAPSULE ORAL 4 TIMES DAILY
Qty: 28 CAP | Refills: 0 | Status: SHIPPED | OUTPATIENT
Start: 2019-10-02 | End: 2019-10-09

## 2019-10-02 ASSESSMENT — PAIN SCALES - GENERAL: PAINLEVEL: 6=MODERATE PAIN

## 2019-10-03 NOTE — PROGRESS NOTES
Subjective:   Anali Tellez is a 59 y.o. female here today for redness and pain in her arms, concern for infection    Cellulitis of arm  She reports that approximately 3 days ago, the rash on her hands got very severe.  2 days ago, she started to notice some redness and streaking going up both of her arms.  Yesterday, her armpits became tender and she noticed swelling there as well.  She denies fevers and chills.  Today, she continues to have the redness and streaking up both arms as well as tenderness in both armpit regions.  Her hands have gotten worse and are very cracked and itchy.    Eczema  She has very severe dermatitis/eczema on her hands that we have not been able to get under control with topical corticosteroids.  She recently had a flare of this for unknown reasons.  States that she has not used any new soaps or hand products although last night she did use a new type of lotion and noticed worsening symptoms this morning.  Prior to that she was using coconut oil and hydrocortisone cream.  Unfortunately, she has not been able to see dermatology due to her insurance.  According to patient, her sister has a very similar condition and has found an effective cream.  Patient plans to contact her sister regarding this medication and let me know so that we can prescribe it if possible.       Current medicines (including changes today)  Current Outpatient Medications   Medication Sig Dispense Refill   • sulfamethoxazole-trimethoprim (BACTRIM DS) 800-160 MG tablet Take 1 Tab by mouth 2 times a day for 7 days. 14 Tab 0   • cephALEXin (KEFLEX) 500 MG Cap Take 1 Cap by mouth 4 times a day for 7 days. 28 Cap 0   • methocarbamol (ROBAXIN) 500 MG Tab Take 1 Tab by mouth every bedtime. 30 Tab 5   • raNITidine (ZANTAC) 150 MG Tab TAKE 1 TABLET ORALLY 2 TIMES DAILY IF NEEDED FOR HEARTBURN WITH MEALS 60 Tab 5   • gabapentin (NEURONTIN) 300 MG Cap Take 1 Cap by mouth 3 times a day as needed. note increased quantity 90  Cap 6   • vitamin D, Ergocalciferol, (DRISDOL) 43006 units Cap capsule Take 1 Cap by mouth every 7 days. 4 Cap 5   • HYDROcodone-acetaminophen (NORCO) 5-325 MG Tab per tablet Take 1 Tab by mouth every four hours as needed.     • Tiotropium Bromide Monohydrate (SPIRIVA RESPIMAT) 2.5 MCG/ACT Aero Soln Take 2 Puffs by mouth every day. 4 g 5   • budesonide-formoterol (SYMBICORT) 160-4.5 MCG/ACT Aerosol INHALE 2 PUFFS ORALLY 2 TIMES DAILY 1 Inhaler 3   • triamterene-hydrochlorothiazide (MAXZIDE 75-50) 75-50 MG Tab TAKE 1 TABLET ORALLY EVERY MORNING 30 Tab 5   • hydrocortisone 2.5 % Cream topical cream Apply thin layer to rash on hands as needed up to twice daily.  To replace kenalog 60 g 2   • albuterol (PROVENTIL) 2.5mg/3ml Nebu Soln solution for nebulization 2.5 mg by Nebulization route every four hours as needed for Shortness of Breath.     • albuterol 108 (90 BASE) MCG/ACT Aero Soln inhalation aerosol Inhale 2 Puffs by mouth every 6 hours as needed for Shortness of Breath. 8.5 g 3     No current facility-administered medications for this visit.      She  has a past medical history of Chronic obstructive pulmonary disease (HCC), Hypertension, Low back pain, and Rash (7/26/2017).    ROS   Denies chest pain, shortness of breath  As above in HPI     Objective:     Vitals:    10/02/19 1646   BP: 128/80   Pulse: 84   Resp: 16   Temp: 36.6 °C (97.9 °F)   SpO2: 98%     Body mass index is 30.12 kg/m².   Physical Exam:  Constitutional: Alert, no distress.  Skin: Warm, dry, good turgor, bilateral hands with grayish plaque and severe cracking, redness extending from mid forearms bilaterally up to axilla.  These areas were outlined with permanent marker.  Eye: Equal, round and reactive, conjunctiva clear, lids normal.  Psych: Alert and oriented x3, normal affect and mood.      Assessment and Plan:   The following treatment plan was discussed    1. Cellulitis of upper extremity, unspecified laterality  With significant cellulitis  bilaterally.  She is hemodynamically stable and afebrile right now so I do not think she needs to be admitted to the hospital.  However, we will start her on dual antibiotic therapy as below.  She will go  her antibiotics immediately after our visit and start them today.  We demarcated the boundaries of the redness and we discussed that if the redness spreads outside of these lines despite her taking her antibiotics she needs to go to the emergency room.  - sulfamethoxazole-trimethoprim (BACTRIM DS) 800-160 MG tablet; Take 1 Tab by mouth 2 times a day for 7 days.  Dispense: 14 Tab; Refill: 0  - cephALEXin (KEFLEX) 500 MG Cap; Take 1 Cap by mouth 4 times a day for 7 days.  Dispense: 28 Cap; Refill: 0    2. Other eczema  With severe flare as likely source of cellulitis.  For now, we will avoid any topical agents.  She will follow-up with her sister regarding the cream that has worked well for her and contact our office when she has this information.        Followup: Return if symptoms worsen or fail to improve.

## 2019-10-03 NOTE — ASSESSMENT & PLAN NOTE
She reports that approximately 3 days ago, the rash on her hands got very severe.  2 days ago, she started to notice some redness and streaking going up both of her arms.  Yesterday, her armpits became tender and she noticed swelling there as well.  She denies fevers and chills.  Today, she continues to have the redness and streaking up both arms as well as tenderness in both armpit regions.  Her hands have gotten worse and are very cracked and itchy.

## 2019-10-03 NOTE — ASSESSMENT & PLAN NOTE
She has very severe dermatitis/eczema on her hands that we have not been able to get under control with topical corticosteroids.  She recently had a flare of this for unknown reasons.  States that she has not used any new soaps or hand products although last night she did use a new type of lotion and noticed worsening symptoms this morning.  Prior to that she was using coconut oil and hydrocortisone cream.  Unfortunately, she has not been able to see dermatology due to her insurance.  According to patient, her sister has a very similar condition and has found an effective cream.  Patient plans to contact her sister regarding this medication and let me know so that we can prescribe it if possible.

## 2019-10-04 ENCOUNTER — TELEPHONE (OUTPATIENT)
Dept: MEDICAL GROUP | Facility: MEDICAL CENTER | Age: 59
End: 2019-10-04

## 2019-10-04 NOTE — TELEPHONE ENCOUNTER
1. Name: Anali Shanelle Ellison  Call Back Number: [phone] (home)   Patient approves a detailed voicemail message: yes    2. Which medication(s) is being requested? MOMETASONE FUROATE 0.1% CREAM    3. What is the preferred Pharmacy?   [facility]    Patient was requesting this ointment for her hands as they are very dry and painful.  Patient also requested that if the medication will require a PA to put it in as soon as possible.  Patient was informed they may receive a return phone call from our office with any additional questions before processing this request.

## 2019-10-11 ENCOUNTER — TELEPHONE (OUTPATIENT)
Dept: MEDICAL GROUP | Facility: MEDICAL CENTER | Age: 59
End: 2019-10-11

## 2019-10-11 DIAGNOSIS — J44.9 COPD WITHOUT EXACERBATION (HCC): ICD-10-CM

## 2019-10-11 RX ORDER — MOMETASONE FUROATE 1 MG/G
CREAM TOPICAL
Qty: 45 G | Refills: 2 | Status: SHIPPED | OUTPATIENT
Start: 2019-10-11 | End: 2020-12-23 | Stop reason: SDUPTHER

## 2019-10-14 NOTE — TELEPHONE ENCOUNTER
Phone Number Called: [phone] (home)       Call outcome: spoke to patient regarding message below    Message: Pt. Advised of below info.

## 2019-10-15 ENCOUNTER — APPOINTMENT (OUTPATIENT)
Dept: RADIOLOGY | Facility: MEDICAL CENTER | Age: 59
End: 2019-10-15
Attending: NURSE PRACTITIONER
Payer: MEDICAID

## 2019-10-17 ENCOUNTER — TELEPHONE (OUTPATIENT)
Dept: MEDICAL GROUP | Facility: MEDICAL CENTER | Age: 59
End: 2019-10-17

## 2019-10-17 NOTE — TELEPHONE ENCOUNTER
1. Name: Anali Shanelle Ellison    Call Back Number: [phone] (home)     Patient approves a detailed voicemail message: yes    2. Which medication(s) is being requested? Keflex    3. [facility]    Patient was informed they may receive a return phone call from our office with any additional questions before processing this request.      Patient stopped by the office requesting a refill on the antibiotic she received at her last appointment. She stated rash was appearing again on her arms. I notified patient usually antibiotics are not refilled without an appointment. Patient did schedule and appointment for 10/18/19 but wanted to see if by any chance she could a refill on med. Please call back.

## 2019-10-18 NOTE — TELEPHONE ENCOUNTER
I need to reevaluate her before I can decide if the antibiotic is appropriate.  We will do this at her visit tomorrow.    Tata Gonsalez M.D.

## 2019-10-29 ENCOUNTER — HOSPITAL ENCOUNTER (OUTPATIENT)
Dept: RADIOLOGY | Facility: MEDICAL CENTER | Age: 59
End: 2019-10-29
Attending: NURSE PRACTITIONER
Payer: MEDICAID

## 2019-10-29 DIAGNOSIS — M47.813 SPONDYLOSIS OF CERVICOTHORACIC REGION W/O MYELOPATHY OR RADICULOPATHY: ICD-10-CM

## 2019-10-29 PROCEDURE — 72148 MRI LUMBAR SPINE W/O DYE: CPT

## 2019-11-20 ENCOUNTER — TELEPHONE (OUTPATIENT)
Dept: MEDICAL GROUP | Facility: MEDICAL CENTER | Age: 59
End: 2019-11-20

## 2019-11-20 RX ORDER — METHOCARBAMOL 500 MG/1
1000 TABLET, FILM COATED ORAL
Qty: 60 TAB | Refills: 5 | Status: SHIPPED | OUTPATIENT
Start: 2019-11-20 | End: 2020-08-24

## 2019-11-20 NOTE — TELEPHONE ENCOUNTER
Please let her know that I have change the prescription so she can take 2 tablets at bedtime.    Tata Gonsalez M.D.

## 2019-11-20 NOTE — TELEPHONE ENCOUNTER
1. Name: Anali Shanelle Ellison  Call Back Number: [phone] (home)     Patient approves a detailed voicemail message: yes    2. Which medication(s) is being requested? Methocarbamol    3. What is the preferred Pharmacy? [facility]    Patient was informed they may receive a return phone call from our office with any additional questions before processing this request.      Patient came into the office, stated she takes 2 tabs of Methocarbamol but when she requested her refill, pharmacy told her it was only for 1 tab. I did notify patient that the last script that was done at her appointment in October says 1 tab by mouth every bedtime. I told her there was no script that said 2 tabs on it. Patient stated she would like to know if she can get a new script for 2 tabs before bedtime (1000mg) since she spasms more at night and it also helps her neuropathy.  Patient would like to be notified as soon as possible if this can be done since she is out of medication.

## 2019-12-03 DIAGNOSIS — J44.9 COPD WITHOUT EXACERBATION (HCC): ICD-10-CM

## 2019-12-04 RX ORDER — TIOTROPIUM BROMIDE INHALATION SPRAY 3.12 UG/1
SPRAY, METERED RESPIRATORY (INHALATION)
Qty: 4 G | Refills: 3 | Status: SHIPPED | OUTPATIENT
Start: 2019-12-04 | End: 2020-05-06

## 2019-12-04 RX ORDER — BUDESONIDE AND FORMOTEROL FUMARATE DIHYDRATE 160; 4.5 UG/1; UG/1
AEROSOL RESPIRATORY (INHALATION)
Qty: 1 INHALER | Refills: 2 | Status: SHIPPED | OUTPATIENT
Start: 2019-12-04 | End: 2020-04-07 | Stop reason: SDUPTHER

## 2019-12-23 DIAGNOSIS — I10 ESSENTIAL HYPERTENSION: ICD-10-CM

## 2019-12-24 RX ORDER — TRIAMTERENE AND HYDROCHLOROTHIAZIDE 75; 50 MG/1; MG/1
TABLET ORAL
Qty: 30 TAB | Refills: 5 | Status: SHIPPED | OUTPATIENT
Start: 2020-01-21 | End: 2020-08-24 | Stop reason: SDUPTHER

## 2019-12-24 NOTE — TELEPHONE ENCOUNTER
Was the patient seen in the last year in this department? Yes      Does patient have an active prescription for medications requested? No     Received Request Via: Pharmacy

## 2020-01-03 ENCOUNTER — TELEPHONE (OUTPATIENT)
Dept: MEDICAL GROUP | Facility: MEDICAL CENTER | Age: 60
End: 2020-01-03

## 2020-01-03 NOTE — TELEPHONE ENCOUNTER
Ranitidine recalled, Sammy reported to cover omeprazole 20 mg otc Pharmacy and Patient asking for new rx.

## 2020-01-07 RX ORDER — OMEPRAZOLE 20 MG/1
20 TABLET, DELAYED RELEASE ORAL DAILY
Qty: 30 TAB | Refills: 3 | Status: SHIPPED | OUTPATIENT
Start: 2020-01-07 | End: 2020-12-23

## 2020-04-07 DIAGNOSIS — J44.9 COPD WITHOUT EXACERBATION (HCC): ICD-10-CM

## 2020-04-07 RX ORDER — BUDESONIDE AND FORMOTEROL FUMARATE DIHYDRATE 160; 4.5 UG/1; UG/1
AEROSOL RESPIRATORY (INHALATION)
Qty: 1 INHALER | Refills: 5 | Status: SHIPPED | OUTPATIENT
Start: 2020-04-07 | End: 2020-08-27

## 2020-05-01 DIAGNOSIS — K21.9 GASTROESOPHAGEAL REFLUX DISEASE, ESOPHAGITIS PRESENCE NOT SPECIFIED: ICD-10-CM

## 2020-05-01 RX ORDER — NICOTINE POLACRILEX 4 MG/1
GUM, CHEWING ORAL
Qty: 28 TAB | Refills: 5 | Status: SHIPPED | OUTPATIENT
Start: 2020-05-01 | End: 2022-05-03

## 2020-05-05 DIAGNOSIS — J44.9 COPD WITHOUT EXACERBATION (HCC): ICD-10-CM

## 2020-05-06 RX ORDER — TIOTROPIUM BROMIDE INHALATION SPRAY 3.12 UG/1
SPRAY, METERED RESPIRATORY (INHALATION)
Qty: 4 G | Refills: 5 | Status: SHIPPED | OUTPATIENT
Start: 2020-05-18 | End: 2020-12-23 | Stop reason: SDUPTHER

## 2020-06-15 DIAGNOSIS — G89.29 CHRONIC BILATERAL LOW BACK PAIN WITH LEFT-SIDED SCIATICA: ICD-10-CM

## 2020-06-15 DIAGNOSIS — M54.42 CHRONIC BILATERAL LOW BACK PAIN WITH LEFT-SIDED SCIATICA: ICD-10-CM

## 2020-06-15 RX ORDER — GABAPENTIN 300 MG/1
CAPSULE ORAL
Qty: 60 CAP | Refills: 0 | Status: SHIPPED | OUTPATIENT
Start: 2020-06-15 | End: 2020-08-18 | Stop reason: SDUPTHER

## 2020-08-06 ENCOUNTER — TELEPHONE (OUTPATIENT)
Dept: MEDICAL GROUP | Facility: MEDICAL CENTER | Age: 60
End: 2020-08-06

## 2020-08-06 NOTE — TELEPHONE ENCOUNTER
MEDICATION PRIOR AUTHORIZATION NEEDED:    1. Name of Medication: Symbicort and Spiriva Respimat   3. Is insurance on file current? [place] [employer]        Medication not covered unless tried and failed BOTH advair and Breo Ellipta    Please advise.     Thank you,     Deepthi Lewis  Medical Assistant

## 2020-08-18 ENCOUNTER — PHARMACY VISIT (OUTPATIENT)
Dept: PHARMACY | Facility: MEDICAL CENTER | Age: 60
End: 2020-08-18
Payer: MEDICARE

## 2020-08-18 DIAGNOSIS — M54.42 CHRONIC BILATERAL LOW BACK PAIN WITH LEFT-SIDED SCIATICA: ICD-10-CM

## 2020-08-18 DIAGNOSIS — G89.29 CHRONIC BILATERAL LOW BACK PAIN WITH LEFT-SIDED SCIATICA: ICD-10-CM

## 2020-08-18 PROCEDURE — RXMED WILLOW AMBULATORY MEDICATION CHARGE: Performed by: INTERNAL MEDICINE

## 2020-08-18 PROCEDURE — RXMED WILLOW AMBULATORY MEDICATION CHARGE: Performed by: NURSE PRACTITIONER

## 2020-08-18 RX ORDER — GABAPENTIN 300 MG/1
CAPSULE ORAL
Qty: 60 CAP | Refills: 3 | Status: SHIPPED | OUTPATIENT
Start: 2020-08-18 | End: 2020-12-23 | Stop reason: SDUPTHER

## 2020-08-18 RX ORDER — HYDROCODONE BITARTRATE AND ACETAMINOPHEN 10; 325 MG/1; MG/1
TABLET ORAL
Qty: 60 TAB | Refills: 0 | OUTPATIENT
Start: 2020-08-18 | End: 2020-09-15 | Stop reason: SDUPTHER

## 2020-08-18 RX ORDER — METHOCARBAMOL 500 MG/1
TABLET, FILM COATED ORAL
Qty: 60 TAB | Refills: 3 | OUTPATIENT
Start: 2020-04-16 | End: 2020-08-18 | Stop reason: SDUPTHER

## 2020-08-18 RX ORDER — METHOCARBAMOL 500 MG/1
TABLET, FILM COATED ORAL
Qty: 60 TAB | Refills: 3 | Status: CANCELLED | OUTPATIENT
Start: 2020-08-18 | End: 2021-08-18

## 2020-08-24 DIAGNOSIS — I10 ESSENTIAL HYPERTENSION: ICD-10-CM

## 2020-08-24 DIAGNOSIS — G89.29 CHRONIC BILATERAL LOW BACK PAIN WITH LEFT-SIDED SCIATICA: ICD-10-CM

## 2020-08-24 DIAGNOSIS — M54.42 CHRONIC BILATERAL LOW BACK PAIN WITH LEFT-SIDED SCIATICA: ICD-10-CM

## 2020-08-24 PROCEDURE — RXMED WILLOW AMBULATORY MEDICATION CHARGE: Performed by: INTERNAL MEDICINE

## 2020-08-24 RX ORDER — TRIAMTERENE AND HYDROCHLOROTHIAZIDE 75; 50 MG/1; MG/1
TABLET ORAL
Qty: 30 TAB | Refills: 5 | Status: CANCELLED | OUTPATIENT
Start: 2020-08-24

## 2020-08-24 RX ORDER — TRIAMTERENE AND HYDROCHLOROTHIAZIDE 75; 50 MG/1; MG/1
TABLET ORAL
Qty: 30 TAB | Refills: 3 | Status: SHIPPED | OUTPATIENT
Start: 2020-08-24 | End: 2020-12-23 | Stop reason: SDUPTHER

## 2020-08-24 RX ORDER — METHOCARBAMOL 500 MG/1
TABLET, FILM COATED ORAL
Qty: 60 TAB | Refills: 3 | Status: SHIPPED | OUTPATIENT
Start: 2020-08-24 | End: 2020-12-23 | Stop reason: SDUPTHER

## 2020-08-24 NOTE — TELEPHONE ENCOUNTER
Received request via: Pharmacy    Was the patient seen in the last year in this department? Yes    Does the patient have an active prescription (recently filled or refills available) for medication(s) requested? yes

## 2020-08-25 ENCOUNTER — TELEPHONE (OUTPATIENT)
Dept: MEDICAL GROUP | Facility: MEDICAL CENTER | Age: 60
End: 2020-08-25

## 2020-08-25 ENCOUNTER — PHARMACY VISIT (OUTPATIENT)
Dept: PHARMACY | Facility: MEDICAL CENTER | Age: 60
End: 2020-08-25
Payer: MEDICARE

## 2020-08-25 NOTE — TELEPHONE ENCOUNTER
1. Caller Name: Anali Shanelle Ellison                          Call Back Number: [phone] (home)         How would the patient prefer to be contacted with a response: Phone call OK to leave a detailed message    patient was denied for a medication called symbicort and would like to see what she can do to get the medication filled     Given to shannan Baum

## 2020-08-27 ENCOUNTER — TELEPHONE (OUTPATIENT)
Dept: MEDICAL GROUP | Facility: MEDICAL CENTER | Age: 60
End: 2020-08-27

## 2020-08-27 DIAGNOSIS — J44.9 COPD WITHOUT EXACERBATION (HCC): ICD-10-CM

## 2020-08-27 PROCEDURE — RXMED WILLOW AMBULATORY MEDICATION CHARGE: Performed by: INTERNAL MEDICINE

## 2020-08-27 NOTE — TELEPHONE ENCOUNTER
1. Caller Name: [phone] (home)                         Call Back Number: [phone] (home)       How would the patient prefer to be contacted with a response: Phone call OK to leave a detailed message    insurance has denied the symbicourt Rx, insurance shows that they prefer advair, reattempted pa?

## 2020-08-28 ENCOUNTER — PHARMACY VISIT (OUTPATIENT)
Dept: PHARMACY | Facility: MEDICAL CENTER | Age: 60
End: 2020-08-28
Payer: MEDICARE

## 2020-09-09 ENCOUNTER — PHARMACY VISIT (OUTPATIENT)
Dept: PHARMACY | Facility: MEDICAL CENTER | Age: 60
End: 2020-09-09
Payer: MEDICARE

## 2020-09-09 PROCEDURE — RXMED WILLOW AMBULATORY MEDICATION CHARGE: Performed by: INTERNAL MEDICINE

## 2020-09-15 PROCEDURE — RXMED WILLOW AMBULATORY MEDICATION CHARGE: Performed by: INTERNAL MEDICINE

## 2020-09-17 ENCOUNTER — PHARMACY VISIT (OUTPATIENT)
Dept: PHARMACY | Facility: MEDICAL CENTER | Age: 60
End: 2020-09-17
Payer: MEDICARE

## 2020-09-17 PROCEDURE — RXMED WILLOW AMBULATORY MEDICATION CHARGE: Performed by: NURSE PRACTITIONER

## 2020-09-22 ENCOUNTER — PHARMACY VISIT (OUTPATIENT)
Dept: PHARMACY | Facility: MEDICAL CENTER | Age: 60
End: 2020-09-22
Payer: MEDICARE

## 2020-09-22 PROCEDURE — RXMED WILLOW AMBULATORY MEDICATION CHARGE: Performed by: INTERNAL MEDICINE

## 2020-10-06 PROCEDURE — RXMED WILLOW AMBULATORY MEDICATION CHARGE: Performed by: INTERNAL MEDICINE

## 2020-10-07 ENCOUNTER — PHARMACY VISIT (OUTPATIENT)
Dept: PHARMACY | Facility: MEDICAL CENTER | Age: 60
End: 2020-10-07
Payer: MEDICARE

## 2020-10-19 ENCOUNTER — PHARMACY VISIT (OUTPATIENT)
Dept: PHARMACY | Facility: MEDICAL CENTER | Age: 60
End: 2020-10-19
Payer: MEDICARE

## 2020-10-19 PROCEDURE — RXMED WILLOW AMBULATORY MEDICATION CHARGE: Performed by: INTERNAL MEDICINE

## 2020-10-19 PROCEDURE — RXMED WILLOW AMBULATORY MEDICATION CHARGE: Performed by: NURSE PRACTITIONER

## 2020-11-18 ENCOUNTER — PHARMACY VISIT (OUTPATIENT)
Dept: PHARMACY | Facility: MEDICAL CENTER | Age: 60
End: 2020-11-18
Payer: COMMERCIAL

## 2020-11-18 PROCEDURE — RXMED WILLOW AMBULATORY MEDICATION CHARGE: Performed by: NURSE PRACTITIONER

## 2020-11-18 PROCEDURE — RXMED WILLOW AMBULATORY MEDICATION CHARGE: Performed by: INTERNAL MEDICINE

## 2020-11-23 PROCEDURE — RXMED WILLOW AMBULATORY MEDICATION CHARGE: Performed by: INTERNAL MEDICINE

## 2020-11-24 ENCOUNTER — PHARMACY VISIT (OUTPATIENT)
Dept: PHARMACY | Facility: MEDICAL CENTER | Age: 60
End: 2020-11-24
Payer: COMMERCIAL

## 2020-11-24 PROCEDURE — RXMED WILLOW AMBULATORY MEDICATION CHARGE: Performed by: INTERNAL MEDICINE

## 2020-12-17 DIAGNOSIS — G89.29 CHRONIC BILATERAL LOW BACK PAIN WITH LEFT-SIDED SCIATICA: ICD-10-CM

## 2020-12-17 DIAGNOSIS — M54.42 CHRONIC BILATERAL LOW BACK PAIN WITH LEFT-SIDED SCIATICA: ICD-10-CM

## 2020-12-17 PROCEDURE — RXMED WILLOW AMBULATORY MEDICATION CHARGE: Performed by: INTERNAL MEDICINE

## 2020-12-17 RX ORDER — METHOCARBAMOL 500 MG/1
TABLET, FILM COATED ORAL
Qty: 60 TAB | Refills: 0 | OUTPATIENT
Start: 2020-12-17 | End: 2021-12-17

## 2020-12-18 ENCOUNTER — PHARMACY VISIT (OUTPATIENT)
Dept: PHARMACY | Facility: MEDICAL CENTER | Age: 60
End: 2020-12-18
Payer: COMMERCIAL

## 2020-12-18 PROCEDURE — RXMED WILLOW AMBULATORY MEDICATION CHARGE: Performed by: NURSE PRACTITIONER

## 2020-12-23 ENCOUNTER — PHARMACY VISIT (OUTPATIENT)
Dept: PHARMACY | Facility: MEDICAL CENTER | Age: 60
End: 2020-12-23
Payer: COMMERCIAL

## 2020-12-23 ENCOUNTER — OFFICE VISIT (OUTPATIENT)
Dept: MEDICAL GROUP | Facility: MEDICAL CENTER | Age: 60
End: 2020-12-23
Attending: INTERNAL MEDICINE
Payer: MEDICARE

## 2020-12-23 VITALS
BODY MASS INDEX: 30.45 KG/M2 | RESPIRATION RATE: 16 BRPM | SYSTOLIC BLOOD PRESSURE: 122 MMHG | WEIGHT: 183 LBS | OXYGEN SATURATION: 97 % | HEART RATE: 78 BPM | TEMPERATURE: 97.3 F | DIASTOLIC BLOOD PRESSURE: 74 MMHG

## 2020-12-23 DIAGNOSIS — J44.9 COPD WITHOUT EXACERBATION (HCC): ICD-10-CM

## 2020-12-23 DIAGNOSIS — M54.42 CHRONIC BILATERAL LOW BACK PAIN WITH LEFT-SIDED SCIATICA: ICD-10-CM

## 2020-12-23 DIAGNOSIS — K59.03 DRUG-INDUCED CONSTIPATION: ICD-10-CM

## 2020-12-23 DIAGNOSIS — G89.29 CHRONIC BILATERAL LOW BACK PAIN WITH LEFT-SIDED SCIATICA: ICD-10-CM

## 2020-12-23 DIAGNOSIS — E55.9 VITAMIN D DEFICIENCY: ICD-10-CM

## 2020-12-23 DIAGNOSIS — L30.8 OTHER ECZEMA: ICD-10-CM

## 2020-12-23 DIAGNOSIS — Z12.11 SCREEN FOR COLON CANCER: ICD-10-CM

## 2020-12-23 DIAGNOSIS — Z13.220 SCREENING, LIPID: ICD-10-CM

## 2020-12-23 DIAGNOSIS — R73.03 PRE-DIABETES: ICD-10-CM

## 2020-12-23 DIAGNOSIS — I10 ESSENTIAL HYPERTENSION: ICD-10-CM

## 2020-12-23 PROBLEM — L03.119 CELLULITIS OF ARM: Status: RESOLVED | Noted: 2019-10-02 | Resolved: 2020-12-23

## 2020-12-23 PROCEDURE — RXMED WILLOW AMBULATORY MEDICATION CHARGE: Performed by: INTERNAL MEDICINE

## 2020-12-23 PROCEDURE — 99214 OFFICE O/P EST MOD 30 MIN: CPT | Performed by: INTERNAL MEDICINE

## 2020-12-23 PROCEDURE — 99213 OFFICE O/P EST LOW 20 MIN: CPT | Performed by: INTERNAL MEDICINE

## 2020-12-23 RX ORDER — MOMETASONE FUROATE 1 MG/G
CREAM TOPICAL
Qty: 45 G | Refills: 2 | Status: SHIPPED | OUTPATIENT
Start: 2020-12-23 | End: 2022-03-03 | Stop reason: SDUPTHER

## 2020-12-23 RX ORDER — TRIAMTERENE AND HYDROCHLOROTHIAZIDE 75; 50 MG/1; MG/1
TABLET ORAL
Qty: 30 TAB | Refills: 3 | Status: SHIPPED | OUTPATIENT
Start: 2020-12-23 | End: 2021-04-20 | Stop reason: SDUPTHER

## 2020-12-23 RX ORDER — TIOTROPIUM BROMIDE INHALATION SPRAY 3.12 UG/1
SPRAY, METERED RESPIRATORY (INHALATION)
Qty: 4 G | Refills: 5 | Status: SHIPPED | OUTPATIENT
Start: 2020-12-23 | End: 2020-12-28 | Stop reason: SDUPTHER

## 2020-12-23 RX ORDER — BUDESONIDE AND FORMOTEROL FUMARATE DIHYDRATE 160; 4.5 UG/1; UG/1
2 AEROSOL RESPIRATORY (INHALATION) 2 TIMES DAILY
Qty: 10.2 G | Refills: 5 | Status: SHIPPED | OUTPATIENT
Start: 2020-12-23 | End: 2021-04-21

## 2020-12-23 RX ORDER — METHOCARBAMOL 500 MG/1
1000 TABLET, FILM COATED ORAL
Qty: 60 TAB | Refills: 5 | Status: SHIPPED | OUTPATIENT
Start: 2020-12-23 | End: 2021-06-15 | Stop reason: SDUPTHER

## 2020-12-23 RX ORDER — GABAPENTIN 300 MG/1
300 CAPSULE ORAL
Qty: 30 CAP | Refills: 5 | Status: SHIPPED | OUTPATIENT
Start: 2020-12-23 | End: 2021-08-26 | Stop reason: SDUPTHER

## 2020-12-23 RX ORDER — ERGOCALCIFEROL 1.25 MG/1
50000 CAPSULE ORAL
Qty: 4 CAP | Refills: 5 | Status: SHIPPED | OUTPATIENT
Start: 2020-12-23 | End: 2022-03-25

## 2020-12-23 ASSESSMENT — FIBROSIS 4 INDEX: FIB4 SCORE: 1

## 2020-12-23 ASSESSMENT — PAIN SCALES - GENERAL: PAINLEVEL: NO PAIN

## 2020-12-23 NOTE — ASSESSMENT & PLAN NOTE
She has intermittent episodes of severe eczema on both of her hands.  Currently, she is having a small flareup due to using hand  but symptoms are significantly better than previous.  She has been able to treat with as needed mometasone and she is requesting a refill today.  In the past we have tried topical hydrocortisone at both 1% and 2.5%, Kenalog 0.1%, and clobetasol 0.1% without significant improvement in symptoms.  She has had excellent relief with the mometasone.

## 2020-12-23 NOTE — ASSESSMENT & PLAN NOTE
She continues on Maxide 75-50.  Blood pressure has been well controlled on this regimen.  In clinic today she is at 122/74.

## 2020-12-23 NOTE — ASSESSMENT & PLAN NOTE
Her last A1c checked in June 2019 was mildly elevated at 5.8.  She is not on any medication for glycemic control.

## 2020-12-23 NOTE — ASSESSMENT & PLAN NOTE
She reports her breathing has been well controlled.  She continues on Spiriva Respimat as well as Advair but states that the Advair is no longer covered by her insurance, and she is requesting an alternative.  She denies cough, shortness of breath.

## 2020-12-23 NOTE — ASSESSMENT & PLAN NOTE
Continues to follow with [facility] for pain management.  She is taking hydrocodone 10-3 25 twice daily as well as gabapentin 300 mg nightly and 1000 mg of methocarbamol at night.  She has been receiving injections through their office however has put these on hold during Covid.  She reports that her pain is stable and well-controlled with her current meds.

## 2020-12-23 NOTE — ASSESSMENT & PLAN NOTE
She reports some mild constipation which she suspects is due to her Norco.  She is able to control it with over-the-counter Dulcolax and docusate as needed.

## 2020-12-23 NOTE — ASSESSMENT & PLAN NOTE
Her last labs showed a low vitamin D at 13.  She was started on high-dose weekly vitamin D however has been out of this for several months and not taking any additional supplement.

## 2020-12-23 NOTE — PROGRESS NOTES
Subjective:   Anali Tellez is a 60 y.o. female here today for medication refills, follow-up COPD, hypertension, chronic medical problems as below    COPD without exacerbation (CMS-HCC)  She reports her breathing has been well controlled.  She continues on Spiriva Respimat as well as Advair but states that the Advair is no longer covered by her insurance, and she is requesting an alternative.  She denies cough, shortness of breath.    HTN (hypertension)  She continues on Maxide 75-50.  Blood pressure has been well controlled on this regimen.  In clinic today she is at 122/74.    Chronic bilateral low back pain with left-sided sciatica  Continues to follow with [facility] for pain management.  She is taking hydrocodone 10-3 25 twice daily as well as gabapentin 300 mg nightly and 1000 mg of methocarbamol at night.  She has been receiving injections through their office however has put these on hold during Covid.  She reports that her pain is stable and well-controlled with her current meds.    Pre-diabetes  Her last A1c checked in June 2019 was mildly elevated at 5.8.  She is not on any medication for glycemic control.    Eczema  She has intermittent episodes of severe eczema on both of her hands.  Currently, she is having a small flareup due to using hand  but symptoms are significantly better than previous.  She has been able to treat with as needed mometasone and she is requesting a refill today.  In the past we have tried topical hydrocortisone at both 1% and 2.5%, Kenalog 0.1%, and clobetasol 0.1% without significant improvement in symptoms.  She has had excellent relief with the mometasone.    Vitamin D deficiency  Her last labs showed a low vitamin D at 13.  She was started on high-dose weekly vitamin D however has been out of this for several months and not taking any additional supplement.    Drug-induced constipation  She reports some mild constipation which she suspects is due to her  Norco.  She is able to control it with over-the-counter Dulcolax and docusate as needed.         Current medicines (including changes today)  Current Outpatient Medications   Medication Sig Dispense Refill   • Tiotropium Bromide Monohydrate (SPIRIVA RESPIMAT) 2.5 MCG/ACT Aero Soln INHALE 2 PUFFS ORALLY ONCE DAILY 4 g 5   • triamterene-hydrochlorothiazide (MAXZIDE 75-50) 75-50 MG Tab TAKE 1 TABLET ORALLY EVERY MORNING 30 Tab 3   • methocarbamol (ROBAXIN) 500 MG Tab Take 2 Tabs by mouth every bedtime. 60 Tab 5   • gabapentin (NEURONTIN) 300 MG Cap Take 1 Cap by mouth every bedtime. 30 Cap 5   • mometasone (ELOCON) 0.1 % Cream Apply thin layer to rash on hands twice daily as needed until resolved 45 g 2   • vitamin D, Ergocalciferol, (DRISDOL) 1.25 MG (89744 UT) Cap capsule Take 1 Cap by mouth every 7 days. 4 Cap 5   • DOCUSATE SODIUM PO Take  by mouth.     • Prenatal MV-Min-Fe Fum-FA-DHA (PRENATAL 1 PO) Take  by mouth.     • budesonide-formoterol (SYMBICORT) 160-4.5 MCG/ACT Aerosol Inhale 2 Puffs 2 Times a Day. 10.2 g 5   • HYDROcodone/acetaminophen (NORCO)  MG Tab Take 1 tablet by mouth twice a day as needed for 30 days. Max 2 per day. 60 Tab 0   • omeprazole (PRILOSEC) 20 MG Tablet Delayed Response delayed-release tablet TAKE 1 TABLET ORALLY ONCE DAILY 28 Tab 5   • albuterol (PROVENTIL) 2.5mg/3ml Nebu Soln solution for nebulization 2.5 mg by Nebulization route every four hours as needed for Shortness of Breath.     • albuterol 108 (90 BASE) MCG/ACT Aero Soln inhalation aerosol Inhale 2 Puffs by mouth every 6 hours as needed for Shortness of Breath. 8.5 g 3     No current facility-administered medications for this visit.      She  has a past medical history of Chronic obstructive pulmonary disease (HCC), Hypertension, Low back pain, and Rash (7/26/2017).    ROS   Denies chest pain, shortness of breath  As above in HPI     Objective:     Vitals:    12/23/20 0900   BP: 122/74   Pulse: 78   Resp: 16   Temp: 36.3  °C (97.3 °F)   SpO2: 97%     Body mass index is 30.45 kg/m².   Physical Exam:  Constitutional: Alert, no distress.  Skin: Warm, dry, good turgor, some mild hyperpigmentation over the palms of her hands in patches consistent with early eczema outbreak.  Eye: Equal, round and reactive, conjunctiva clear, lids normal.  ENMT: Lips without lesions, fair dentition, oropharynx clear, TM's clear bilaterally  Neck: Trachea midline, no masses, no thyromegaly. No cervical or supraclavicular lymphadenopathy  Respiratory: Unlabored respiratory effort, lungs clear to auscultation, no wheezes, no ronchi.  Cardiovascular: Regular rate and rhythm, no murmurs appreciated, no lower extremity edema  Abdomen: Soft, non-tender, no masses, no hepatosplenomegaly.  Psych: Alert and oriented x3, normal affect and mood.      Assessment and Plan:   The following treatment plan was discussed    1. COPD without exacerbation (HCC)  Stable, well-controlled with Spiriva Respimat and Advair.  We will switch to Symbicort due to change in insurance formulary  - Tiotropium Bromide Monohydrate (SPIRIVA RESPIMAT) 2.5 MCG/ACT Aero Soln; INHALE 2 PUFFS ORALLY ONCE DAILY  Dispense: 4 g; Refill: 5  - budesonide-formoterol (SYMBICORT) 160-4.5 MCG/ACT Aerosol; Inhale 2 Puffs 2 Times a Day.  Dispense: 10.2 g; Refill: 5    2. Essential hypertension  Stable, well-controlled with current meds which were refilled today  - triamterene-hydrochlorothiazide (MAXZIDE 75-50) 75-50 MG Tab; TAKE 1 TABLET ORALLY EVERY MORNING  Dispense: 30 Tab; Refill: 3  - Comp Metabolic Panel; Future    3. Chronic bilateral low back pain with left-sided sciatica  Able, she will continue follow-up with pain management for her hydrocodone and injections.  I have refilled her gabapentin and Robaxin.  -Continue pain management follow-up  - methocarbamol (ROBAXIN) 500 MG Tab; Take 2 Tabs by mouth every bedtime.  Dispense: 60 Tab; Refill: 5  - gabapentin (NEURONTIN) 300 MG Cap; Take 1 Cap by  mouth every bedtime.  Dispense: 30 Cap; Refill: 5    4. Other eczema  Stable although the start of an outbreak currently.  She will continue mometasone use as needed  - mometasone (ELOCON) 0.1 % Cream; Apply thin layer to rash on hands twice daily as needed until resolved  Dispense: 45 g; Refill: 2    5. Vitamin D deficiency  We will restart supplemental vitamin D  - vitamin D, Ergocalciferol, (DRISDOL) 1.25 MG (56543 UT) Cap capsule; Take 1 Cap by mouth every 7 days.  Dispense: 4 Cap; Refill: 5  - VITAMIN D,25 HYDROXY; Future    6. Screen for colon cancer  - OCCULT BLOOD FECES IMMUNOASSAY; Future    7. Pre-diabetes  - HEMOGLOBIN A1C; Future    8. Screening, lipid  - Lipid Profile; Future    9. Drug-induced constipation  Stable with OTC stool softener which she will continue        Followup: Return in about 6 months (around 6/23/2021), or if symptoms worsen or fail to improve.

## 2020-12-28 DIAGNOSIS — J44.9 COPD WITHOUT EXACERBATION (HCC): ICD-10-CM

## 2020-12-29 RX ORDER — TIOTROPIUM BROMIDE INHALATION SPRAY 3.12 UG/1
SPRAY, METERED RESPIRATORY (INHALATION)
Qty: 4 G | Refills: 11 | Status: SHIPPED | OUTPATIENT
Start: 2020-12-29 | End: 2021-04-21

## 2020-12-30 ENCOUNTER — TELEPHONE (OUTPATIENT)
Dept: MEDICAL GROUP | Facility: MEDICAL CENTER | Age: 60
End: 2020-12-30

## 2020-12-30 DIAGNOSIS — K04.7 DENTAL INFECTION: ICD-10-CM

## 2020-12-31 NOTE — TELEPHONE ENCOUNTER
Please find out which medication she has concerns about and what questions she has.  Let her know she can also send me a [employer] message about it if that is easier.  Tata Gonsalez M.D.

## 2020-12-31 NOTE — TELEPHONE ENCOUNTER
VOICEMAIL  1. Caller Name: toney                        Call Back Number: [phone] (home)       2. Message:       Would like to speak to you regarding one of her medication.      Please advice.

## 2021-01-04 DIAGNOSIS — J44.9 COPD WITHOUT EXACERBATION (HCC): ICD-10-CM

## 2021-01-05 RX ORDER — ALBUTEROL SULFATE 90 UG/1
2 AEROSOL, METERED RESPIRATORY (INHALATION) EVERY 6 HOURS PRN
Qty: 8.5 G | Refills: 11 | Status: SHIPPED | OUTPATIENT
Start: 2021-01-05 | End: 2022-05-06 | Stop reason: SDUPTHER

## 2021-01-05 NOTE — TELEPHONE ENCOUNTER
"Called pt: patient states that she needs her inhaler, \"spariva respimat\"  She is going to send a mychart message also   "

## 2021-01-05 NOTE — TELEPHONE ENCOUNTER
She has 11 refills on file for her spiriva respimat and should be able to pick it up at the pharmacy unless she is too early for a refill.  The pharmacy should be able to tell her if this is the case.  Tata Gonsalez M.D.

## 2021-01-06 ENCOUNTER — PHARMACY VISIT (OUTPATIENT)
Dept: PHARMACY | Facility: MEDICAL CENTER | Age: 61
End: 2021-01-06
Payer: COMMERCIAL

## 2021-01-06 PROCEDURE — RXMED WILLOW AMBULATORY MEDICATION CHARGE: Performed by: INTERNAL MEDICINE

## 2021-01-06 RX ORDER — AMOXICILLIN 500 MG/1
500 CAPSULE ORAL 3 TIMES DAILY
Qty: 21 CAP | Refills: 0 | Status: SHIPPED | OUTPATIENT
Start: 2021-01-06 | End: 2021-01-14

## 2021-01-06 RX ORDER — HYDROCODONE BITARTRATE AND ACETAMINOPHEN 10; 325 MG/1; MG/1
TABLET ORAL
Qty: 60 TAB | Refills: 0 | OUTPATIENT
Start: 2021-01-06 | End: 2021-02-02 | Stop reason: SDUPTHER

## 2021-01-06 NOTE — TELEPHONE ENCOUNTER
Phone Number Called: [phone] (home)       Call outcome: Spoke to patient regarding message below.    Message: talked with patient regarding her inhaler. she also had a question regarding her having a tooth infection and was wondering if she can have some antibiotic for the pain due to her dentist appt being till may 11th

## 2021-01-07 ENCOUNTER — PHARMACY VISIT (OUTPATIENT)
Dept: PHARMACY | Facility: MEDICAL CENTER | Age: 61
End: 2021-01-07
Payer: COMMERCIAL

## 2021-01-07 NOTE — TELEPHONE ENCOUNTER
Phone Number Called: [phone] (home)       Call outcome: Left detailed message for patient. Informed to call back with any additional questions.    Message: LEFT VM REGARDING HER AMOXICILLIN SENT TO HER PHARMACY IF ANY QUESTIONS TO CALL BACK

## 2021-01-10 ENCOUNTER — PATIENT MESSAGE (OUTPATIENT)
Dept: MEDICAL GROUP | Facility: MEDICAL CENTER | Age: 61
End: 2021-01-10

## 2021-01-10 DIAGNOSIS — R05.9 COUGH: ICD-10-CM

## 2021-01-12 ENCOUNTER — PHARMACY VISIT (OUTPATIENT)
Dept: PHARMACY | Facility: MEDICAL CENTER | Age: 61
End: 2021-01-12
Payer: COMMERCIAL

## 2021-01-12 PROCEDURE — RXMED WILLOW AMBULATORY MEDICATION CHARGE: Performed by: INTERNAL MEDICINE

## 2021-01-12 RX ORDER — BENZONATATE 100 MG/1
100 CAPSULE ORAL 3 TIMES DAILY PRN
Qty: 30 CAP | Refills: 0 | Status: SHIPPED | OUTPATIENT
Start: 2021-01-12 | End: 2021-01-22

## 2021-01-15 ENCOUNTER — PHARMACY VISIT (OUTPATIENT)
Dept: PHARMACY | Facility: MEDICAL CENTER | Age: 61
End: 2021-01-15
Payer: COMMERCIAL

## 2021-01-15 DIAGNOSIS — J44.9 COPD WITHOUT EXACERBATION (HCC): ICD-10-CM

## 2021-01-15 PROCEDURE — RXMED WILLOW AMBULATORY MEDICATION CHARGE: Performed by: INTERNAL MEDICINE

## 2021-01-15 RX ORDER — ALBUTEROL SULFATE 2.5 MG/3ML
2.5 SOLUTION RESPIRATORY (INHALATION) EVERY 4 HOURS PRN
Qty: 30 BULLET | Refills: 5 | Status: SHIPPED | OUTPATIENT
Start: 2021-01-15 | End: 2023-01-17 | Stop reason: SDUPTHER

## 2021-01-20 ENCOUNTER — TELEPHONE (OUTPATIENT)
Dept: MEDICAL GROUP | Facility: MEDICAL CENTER | Age: 61
End: 2021-01-20

## 2021-01-20 NOTE — TELEPHONE ENCOUNTER
1. Caller Name: Anali Shanelle Ellison                        Call Back Number: [phone] (home)       How would the patient prefer to be contacted with a response: Phone call OK to leave a detailed message    Pt is asking for cough medication, Pt stated that she has had a continuing cough that started from the date of her last visit. Pt stated she has had no fevers, no headaches, the only body ache she has is her torso and stomach from coughing. please advise.

## 2021-01-22 ENCOUNTER — PHARMACY VISIT (OUTPATIENT)
Dept: PHARMACY | Facility: MEDICAL CENTER | Age: 61
End: 2021-01-22
Payer: COMMERCIAL

## 2021-01-22 ENCOUNTER — APPOINTMENT (OUTPATIENT)
Dept: RADIOLOGY | Facility: IMAGING CENTER | Age: 61
End: 2021-01-22
Attending: NURSE PRACTITIONER
Payer: MEDICARE

## 2021-01-22 ENCOUNTER — NURSE TRIAGE (OUTPATIENT)
Dept: HEALTH INFORMATION MANAGEMENT | Facility: OTHER | Age: 61
End: 2021-01-22

## 2021-01-22 ENCOUNTER — OFFICE VISIT (OUTPATIENT)
Dept: URGENT CARE | Facility: CLINIC | Age: 61
End: 2021-01-22
Payer: MEDICARE

## 2021-01-22 VITALS
OXYGEN SATURATION: 92 % | TEMPERATURE: 96.7 F | HEART RATE: 90 BPM | SYSTOLIC BLOOD PRESSURE: 124 MMHG | RESPIRATION RATE: 18 BRPM | WEIGHT: 175 LBS | DIASTOLIC BLOOD PRESSURE: 66 MMHG | BODY MASS INDEX: 28.12 KG/M2 | HEIGHT: 66 IN

## 2021-01-22 DIAGNOSIS — R05.9 COUGH: ICD-10-CM

## 2021-01-22 DIAGNOSIS — J44.9 COPD WITHOUT EXACERBATION (HCC): ICD-10-CM

## 2021-01-22 DIAGNOSIS — R06.02 SOB (SHORTNESS OF BREATH): ICD-10-CM

## 2021-01-22 DIAGNOSIS — Z72.0 TOBACCO USE: ICD-10-CM

## 2021-01-22 PROCEDURE — 71046 X-RAY EXAM CHEST 2 VIEWS: CPT | Mod: TC | Performed by: NURSE PRACTITIONER

## 2021-01-22 PROCEDURE — RXMED WILLOW AMBULATORY MEDICATION CHARGE: Performed by: NURSE PRACTITIONER

## 2021-01-22 PROCEDURE — RXMED WILLOW AMBULATORY MEDICATION CHARGE: Performed by: INTERNAL MEDICINE

## 2021-01-22 PROCEDURE — 99214 OFFICE O/P EST MOD 30 MIN: CPT | Performed by: NURSE PRACTITIONER

## 2021-01-22 RX ORDER — PREDNISONE 20 MG/1
TABLET ORAL
Qty: 14 EACH | Refills: 0 | Status: SHIPPED | OUTPATIENT
Start: 2021-01-22 | End: 2021-01-29

## 2021-01-22 ASSESSMENT — COPD QUESTIONNAIRES: COPD: 1

## 2021-01-22 ASSESSMENT — ENCOUNTER SYMPTOMS
ORTHOPNEA: 0
WEAKNESS: 0
CHILLS: 0
HEADACHES: 0
ABDOMINAL PAIN: 0
FEVER: 0
DIZZINESS: 0
SORE THROAT: 1
EYE PAIN: 0
COUGH: 1
VOMITING: 0
MYALGIAS: 0
NAUSEA: 0
SPUTUM PRODUCTION: 1
NERVOUS/ANXIOUS: 0
SHORTNESS OF BREATH: 1

## 2021-01-22 ASSESSMENT — FIBROSIS 4 INDEX: FIB4 SCORE: 1

## 2021-01-22 NOTE — TELEPHONE ENCOUNTER
Pt has COPD and has a chronic cough that is staying the same but it keeps her awake at night and makes her SOB.  Pt is not coughing anything up.       visit scheduled.     Reason for Disposition  • Patient sounds very sick or weak to the triager    Additional Information  • Negative: Bluish (or gray) lips or face  • Negative: SEVERE difficulty breathing (e.g., struggling for each breath, speaks in single words)  • Negative: Rapid onset of cough and has hives  • Negative: Coughing started suddenly after medicine, an allergic food or bee sting  • Negative: Difficulty breathing after exposure to flames, smoke, or fumes  • Negative: Sounds like a life-threatening emergency to the triager  • Negative: Previous asthma attacks and this feels like asthma attack  • Negative: Chest pain present when not coughing  • Negative: Difficulty breathing  • Negative: Passed out (i.e., fainted, collapsed and was not responding)  • Negative: Coughed up > 1 tablespoon (15 ml) blood (Exception: blood-tinged sputum)  • Negative: Fever > 103 F (39.4 C)  • Negative: Fever > 101 F (38.3 C) and over 60 years of age  • Negative: Fever > 100.0 F (37.8 C) and has diabetes mellitus or a weak immune system (e.g., HIV positive, cancer chemotherapy, organ transplant, splenectomy, chronic steroids)  • Negative: Fever > 100.0 F (37.8 C) and bedridden (e.g., nursing home patient, stroke, chronic illness, recovering from surgery)  • Negative: Increasing ankle swelling  • Negative: Wheezing is present  • Negative: SEVERE coughing spells (e.g., whooping sound after coughing, vomiting after coughing)  • Negative: Coughing up tara-colored (reddish-brown) or blood-tinged sputum  • Negative: Fever present > 3 days (72 hours)  • Negative: Fever returns after gone for over 24 hours and symptoms worse or not improved  • Negative: Using nasal washes and pain medicine > 24 hours and sinus pain persists  • Negative: Known COPD or other severe lung disease (i.e.,  "bronchiectasis, cystic fibrosis, lung surgery) and worsening symptoms (i.e., increased sputum purulence or amount, increased breathing difficulty)  • Negative: Continuous (nonstop) coughing interferes with work or school and no improvement using cough treatment per Care Advice  • Negative: Patient wants to be seen  • Negative: Cough has been present for > 3 weeks  • Negative: Allergy symptoms are also present (e.g., itchy eyes, clear nasal discharge, postnasal drip)  • Negative: Nasal discharge present > 10 days  • Negative: Exposure to TB (Tuberculosis)  • Negative: Taking an ACE Inhibitor medication (e.g., benazepril/LOTENSIN, captopril/CAPOTEN, enalapril/VASOTEC, lisinopril/ZESTRIL)    Answer Assessment - Initial Assessment Questions  1. ONSET: \"When did the cough begin?\"       3 to 4 weeks ago  2. SEVERITY: \"How bad is the cough today?\"       Increase is SOB  3. RESPIRATORY DISTRESS: \"Describe your breathing.\"       Wheezing and increase in SOB  4. FEVER: \"Do you have a fever?\" If so, ask: \"What is your temperature, how was it measured, and when did it start?\"      no  5. HEMOPTYSIS: \"Are you coughing up any blood?\" If so ask: \"How much?\" (flecks, streaks, tablespoons, etc.)      no  6. TREATMENT: \"What have you done so far to treat the cough?\" (e.g., meds, fluids, humidifier)      Teas and robitussin  7. CARDIAC HISTORY: \"Do you have any history of heart disease?\" (e.g., heart attack, congestive heart failure)       no  8. LUNG HISTORY: \"Do you have any history of lung disease?\"  (e.g., pulmonary embolus, asthma, emphysema)      COPD and asthma  9. PE RISK FACTORS: \"Do you have a history of blood clots?\" (or: recent major surgery, recent prolonged travel, bedridden)      no  10. OTHER SYMPTOMS: \"Do you have any other symptoms? (e.g., runny nose, wheezing, chest pain)        wheezing  11. PREGNANCY: \"Is there any chance you are pregnant?\" \"When was your last menstrual period?\"        no  12. TRAVEL: \"Have you " "traveled out of the country in the last month?\" (e.g., travel history, exposures)        no    Protocols used: COUGH-A-OH      "

## 2021-01-23 NOTE — PROGRESS NOTES
Subjective:   Anali Tellez is a 60 y.o. female who presents for Cough (i3iscyt sob)       Cough  This is a new problem. The current episode started 1 to 4 weeks ago. The problem has been waxing and waning. The problem occurs every few minutes. The cough is productive of sputum. Associated symptoms include a sore throat (r/t coughing) and shortness of breath. Pertinent negatives include no chest pain, chills, fever, headaches, myalgias or rash. Nothing aggravates the symptoms. Risk factors for lung disease include smoking/tobacco exposure. She has tried a beta-agonist inhaler, ipratropium inhaler and OTC cough suppressant for the symptoms. The treatment provided mild relief. Her past medical history is significant for COPD.     Pt presents for evaluation of a new problem, reports a 5-week history of cough that varies between dry and productive.  Has known [place] PD, has taken her inhalers as prescribed.  Patient also reports increased shortness of breath, in particular at nighttime.  States that she will wake up feeling short of breath and need to use her rescue inhaler.  Due to this, patient states that she has had difficulty sleeping over the past 5 weeks.  States that she was sick for only 1 to 2 hours before she is woken up by coughing.  Over the past 2 to 3 days, patient states that she has used her rescue inhaler approximately 5-7 times per day, with minimal relief.  Has a long history of smoking, however she reports smoking only 1-2 times for the past 5 weeks due to cough and current symptoms.  She denies chest pressure, chest heaviness, or lower extremity edema.  Denies any known ill contacts, has been very careful and quarantining at home.    Review of Systems   Constitutional: Negative for chills, fever and malaise/fatigue.   HENT: Positive for sore throat (r/t coughing). Negative for congestion.    Eyes: Negative for pain.   Respiratory: Positive for cough, sputum production and shortness of breath.     Cardiovascular: Negative for chest pain, orthopnea and leg swelling.   Gastrointestinal: Negative for abdominal pain, nausea and vomiting.   Genitourinary: Negative for dysuria.   Musculoskeletal: Negative for myalgias.   Skin: Negative for rash.   Neurological: Negative for dizziness, weakness and headaches.   Psychiatric/Behavioral: The patient is not nervous/anxious. Insomnia: r/t coughing.    All other systems reviewed and are negative.      MEDS:   Current Outpatient Medications:   •  predniSONE (DELTASONE) 20 MG Tab, Take 2 tablets once a day x7 days, Disp: 14 Each, Rfl: 0  •  albuterol (PROVENTIL) 2.5mg/3ml Nebu Soln solution for nebulization, Take 3 mL by nebulization every four hours as needed for Shortness of Breath., Disp: 30 Bullet, Rfl: 5  •  albuterol 108 (90 Base) MCG/ACT Aero Soln inhalation aerosol, Inhale 2 Puffs by mouth every 6 hours as needed for Shortness of Breath., Disp: 8.5 g, Rfl: 11  •  Tiotropium Bromide Monohydrate (SPIRIVA RESPIMAT) 2.5 MCG/ACT Aero Soln, INHALE 2 PUFFS ORALLY ONCE DAILY, Disp: 4 g, Rfl: 11  •  triamterene-hydrochlorothiazide (MAXZIDE 75-50) 75-50 MG Tab, TAKE 1 TABLET ORALLY EVERY MORNING, Disp: 30 Tab, Rfl: 3  •  methocarbamol (ROBAXIN) 500 MG Tab, Take 2 Tabs by mouth every bedtime., Disp: 60 Tab, Rfl: 5  •  gabapentin (NEURONTIN) 300 MG Cap, Take 1 capsule by mouth once daily at bedtime., Disp: 30 Cap, Rfl: 5  •  budesonide-formoterol (SYMBICORT) 160-4.5 MCG/ACT Aerosol, Inhale 2 Puffs 2 Times a Day., Disp: 10.2 g, Rfl: 5  •  omeprazole (PRILOSEC) 20 MG Tablet Delayed Response delayed-release tablet, TAKE 1 TABLET ORALLY ONCE DAILY, Disp: 28 Tab, Rfl: 5  •  HYDROcodone/acetaminophen (NORCO)  MG Tab, Take 1 tablet by mouth 2 times a day as needed for 30 days; max 2 tab a day., Disp: 60 Tab, Rfl: 0  •  mometasone (ELOCON) 0.1 % Cream, Apply thin layer to rash on hands twice daily as needed until resolved, Disp: 45 g, Rfl: 2  •  vitamin D, Ergocalciferol,  "(DRISDOL) 1.25 MG (89316 UT) Cap capsule, Take 1 Cap by mouth every 7 days., Disp: 4 Cap, Rfl: 5  •  DOCUSATE SODIUM PO, Take  by mouth., Disp: , Rfl:   •  Prenatal MV-Min-Fe Fum-FA-DHA (PRENATAL 1 PO), Take  by mouth., Disp: , Rfl:   ALLERGIES: No Known Allergies    Patient's PMH, SocHx, SurgHx, FamHx, Drug allergies and medications were reviewed.     Objective:   /66 (BP Location: Right arm, Patient Position: Sitting, BP Cuff Size: Adult)   Pulse 90   Temp 35.9 °C (96.7 °F) (Temporal)   Resp 18   Ht 1.664 m (5' 5.5\")   Wt 79.4 kg (175 lb)   LMP 05/19/2010   SpO2 92%   BMI 28.68 kg/m²     Physical Exam  Vitals signs and nursing note reviewed.   Constitutional:       General: She is awake.      Appearance: Normal appearance. She is well-developed and normal weight.   HENT:      Head: Normocephalic and atraumatic.      Right Ear: Tympanic membrane, ear canal and external ear normal.      Left Ear: Tympanic membrane, ear canal and external ear normal.      Nose: Nose normal.      Mouth/Throat:      Mouth: Mucous membranes are moist.      Pharynx: Oropharynx is clear.   Eyes:      Extraocular Movements: Extraocular movements intact.      Conjunctiva/sclera: Conjunctivae normal.      Pupils: Pupils are equal, round, and reactive to light.   Neck:      Musculoskeletal: Full passive range of motion without pain, normal range of motion and neck supple.      Thyroid: No thyromegaly.      Trachea: Trachea normal.   Cardiovascular:      Rate and Rhythm: Normal rate and regular rhythm.      Pulses: Normal pulses.      Heart sounds: Normal heart sounds, S1 normal and S2 normal.   Pulmonary:      Effort: Pulmonary effort is normal. No respiratory distress.      Breath sounds: Examination of the right-upper field reveals wheezing. Examination of the left-upper field reveals wheezing. Decreased breath sounds and wheezing present. No rhonchi or rales.   Abdominal:      General: Bowel sounds are normal.      " Palpations: Abdomen is soft.   Musculoskeletal: Normal range of motion.   Lymphadenopathy:      Cervical: No cervical adenopathy.   Skin:     General: Skin is warm and dry.      Capillary Refill: Capillary refill takes less than 2 seconds.   Neurological:      General: No focal deficit present.      Mental Status: She is alert and oriented to person, place, and time.      Gait: Gait is intact.   Psychiatric:         Attention and Perception: Attention and perception normal.         Mood and Affect: Mood normal.         Speech: Speech normal.         Behavior: Behavior normal. Behavior is cooperative.         Thought Content: Thought content normal.         Judgment: Judgment normal.       Narrative & Impression        1/22/2021 3:48 PM     HISTORY/REASON FOR EXAM:  Cough  Short of breath.     TECHNIQUE/EXAM DESCRIPTION AND NUMBER OF VIEWS:  Two views of the chest.     COMPARISON:  1/24/2017.     FINDINGS:     No pulmonary infiltrates or consolidations are noted.  No pleural effusions, no pneumothorax are appreciated.  Normal cardiopericardial silhouette.     IMPRESSION:        1. No active cardiopulmonary abnormalities are identified.         Assessment/Plan:   Assessment    1. COPD without exacerbation (HCC)  - DX-CHEST-2 VIEWS; Future  - predniSONE (DELTASONE) 20 MG Tab; Take 2 tablets once a day x7 days  Dispense: 14 Each; Refill: 0    2. Cough  - DX-CHEST-2 VIEWS; Future  - predniSONE (DELTASONE) 20 MG Tab; Take 2 tablets once a day x7 days  Dispense: 14 Each; Refill: 0    3. SOB (shortness of breath)  - DX-CHEST-2 VIEWS; Future  - predniSONE (DELTASONE) 20 MG Tab; Take 2 tablets once a day x7 days  Dispense: 14 Each; Refill: 0    4. Tobacco use  - DX-CHEST-2 VIEWS; Future      Reviewed today's test results that were completed in the clinic.  Begin medications as listed.  Supportive care options discussed and reviewed.  Patient advised to use OTC Delsym cough syrup on a as needed basis.  Should her cough and not  improve, and/or she has worsening shortness of breath, then advised to return for follow-up.    Return to urgent care clinic or PCP if current symptoms do not improve and/or worsening symptoms occur.  Recommend patient to follow-up with primary care provider within 1 week due to prolonged coughing symptoms and history of COPD.  Differential diagnosis, natural history, and indications for immediate follow-up were discussed.  Advised of signs and symptoms which would warrant further evaluation and /or emergent evaluation in ED.  All questions answered and the patient agrees to the plan of care.       Please note that this dictation was created using voice recognition software. I have made every reasonable attempt to correct obvious errors, but I expect that there may be errors of grammar and possibly content that I did not discover before finalizing the note.

## 2021-02-08 PROCEDURE — RXMED WILLOW AMBULATORY MEDICATION CHARGE: Performed by: INTERNAL MEDICINE

## 2021-02-09 ENCOUNTER — PHARMACY VISIT (OUTPATIENT)
Dept: PHARMACY | Facility: MEDICAL CENTER | Age: 61
End: 2021-02-09
Payer: COMMERCIAL

## 2021-02-09 ENCOUNTER — TELEPHONE (OUTPATIENT)
Dept: MEDICAL GROUP | Facility: MEDICAL CENTER | Age: 61
End: 2021-02-09

## 2021-02-09 DIAGNOSIS — J44.1 COPD EXACERBATION (HCC): ICD-10-CM

## 2021-02-09 NOTE — TELEPHONE ENCOUNTER
1. Caller Name: Anali Shanelle Ellison                          Call Back Number: [phone] (home)       How would the patient prefer to be contacted with a response: Phone call OK to leave a detailed message      pt report that she went to urgent care for shortness of breath as well as coughing up sputum, Pt was prescribed predinosne and reported that it help but is now out of the medication and reports that her symptoms are coming back. Pt stated that she is having to use her inhaler more than 2 times every 6 hrs. Pt states that she feels like she is having to cough up phlem and sputum more than she has in the past.

## 2021-02-10 PROCEDURE — RXMED WILLOW AMBULATORY MEDICATION CHARGE: Performed by: INTERNAL MEDICINE

## 2021-02-10 RX ORDER — PREDNISONE 10 MG/1
TABLET ORAL
Qty: 32 TABLET | Refills: 0 | Status: SHIPPED | OUTPATIENT
Start: 2021-02-10 | End: 2021-02-28

## 2021-02-10 NOTE — TELEPHONE ENCOUNTER
Please let her know that I will prescribe her a longer prednisone taper.  Sometimes people end up needing to be on steroids for little bit longer than 1 week and this will be a 2-week taper.  She should follow-up with us if she worsens upon completion of this therapy.    Tata Gonsalez M.D.

## 2021-02-12 ENCOUNTER — PHARMACY VISIT (OUTPATIENT)
Dept: PHARMACY | Facility: MEDICAL CENTER | Age: 61
End: 2021-02-12
Payer: COMMERCIAL

## 2021-02-22 PROCEDURE — RXMED WILLOW AMBULATORY MEDICATION CHARGE: Performed by: NURSE PRACTITIONER

## 2021-02-22 PROCEDURE — RXMED WILLOW AMBULATORY MEDICATION CHARGE: Performed by: INTERNAL MEDICINE

## 2021-02-23 ENCOUNTER — PHARMACY VISIT (OUTPATIENT)
Dept: PHARMACY | Facility: MEDICAL CENTER | Age: 61
End: 2021-02-23
Payer: COMMERCIAL

## 2021-03-15 DIAGNOSIS — Z23 NEED FOR VACCINATION: ICD-10-CM

## 2021-03-15 PROCEDURE — RXMED WILLOW AMBULATORY MEDICATION CHARGE: Performed by: INTERNAL MEDICINE

## 2021-03-16 PROCEDURE — RXMED WILLOW AMBULATORY MEDICATION CHARGE: Performed by: INTERNAL MEDICINE

## 2021-03-18 ENCOUNTER — PHARMACY VISIT (OUTPATIENT)
Dept: PHARMACY | Facility: MEDICAL CENTER | Age: 61
End: 2021-03-18
Payer: COMMERCIAL

## 2021-03-24 ENCOUNTER — PHARMACY VISIT (OUTPATIENT)
Dept: PHARMACY | Facility: MEDICAL CENTER | Age: 61
End: 2021-03-24
Payer: COMMERCIAL

## 2021-03-24 PROCEDURE — RXMED WILLOW AMBULATORY MEDICATION CHARGE: Performed by: NURSE PRACTITIONER

## 2021-04-20 DIAGNOSIS — I10 ESSENTIAL HYPERTENSION: ICD-10-CM

## 2021-04-20 PROCEDURE — RXMED WILLOW AMBULATORY MEDICATION CHARGE: Performed by: INTERNAL MEDICINE

## 2021-04-20 NOTE — TELEPHONE ENCOUNTER
1. Caller Name: Anali Shanelle Ellison                        Call Back Number: [phone] (home)       How would the patient prefer to be contacted with a response: Phone call OK to leave a detailed message      Pt called and is asking for additional refills on her prednisone for her chest congestion, pt stated she has been having continuing chest congestion and has not been able to get rid of existing cough, pt was originally scheduled for Virtual visit however she did state that she does not have smart phone or computer access.   Pt did state that she has had her covid vaccine.   We can change her virtual to in person.   Do you want the Patient in person for visit?

## 2021-04-20 NOTE — TELEPHONE ENCOUNTER
Received request via: Pharmacy    Was the patient seen in the last year in this department? Yes    Does the patient have an active prescription (recently filled or refills available) for medication(s) requested? No   Future Appointments       Provider Department [facility]    4/21/2021 3:00 PM Tata Gonsalez M.D. [facility]

## 2021-04-21 ENCOUNTER — OFFICE VISIT (OUTPATIENT)
Dept: MEDICAL GROUP | Facility: MEDICAL CENTER | Age: 61
End: 2021-04-21
Attending: INTERNAL MEDICINE
Payer: MEDICARE

## 2021-04-21 ENCOUNTER — PHARMACY VISIT (OUTPATIENT)
Dept: PHARMACY | Facility: MEDICAL CENTER | Age: 61
End: 2021-04-21
Payer: COMMERCIAL

## 2021-04-21 VITALS
WEIGHT: 184 LBS | OXYGEN SATURATION: 97 % | RESPIRATION RATE: 16 BRPM | TEMPERATURE: 97.3 F | BODY MASS INDEX: 29.57 KG/M2 | HEART RATE: 76 BPM | SYSTOLIC BLOOD PRESSURE: 118 MMHG | DIASTOLIC BLOOD PRESSURE: 64 MMHG | HEIGHT: 66 IN

## 2021-04-21 DIAGNOSIS — J44.9 COPD WITHOUT EXACERBATION (HCC): ICD-10-CM

## 2021-04-21 DIAGNOSIS — L82.1 SEBORRHEIC KERATOSES: ICD-10-CM

## 2021-04-21 PROCEDURE — RXMED WILLOW AMBULATORY MEDICATION CHARGE: Performed by: INTERNAL MEDICINE

## 2021-04-21 PROCEDURE — 17110 DESTRUCTION B9 LES UP TO 14: CPT | Performed by: INTERNAL MEDICINE

## 2021-04-21 PROCEDURE — 99212 OFFICE O/P EST SF 10 MIN: CPT | Performed by: INTERNAL MEDICINE

## 2021-04-21 PROCEDURE — 99213 OFFICE O/P EST LOW 20 MIN: CPT | Mod: 25 | Performed by: INTERNAL MEDICINE

## 2021-04-21 RX ORDER — TRIAMTERENE AND HYDROCHLOROTHIAZIDE 75; 50 MG/1; MG/1
TABLET ORAL
Qty: 30 TABLET | Refills: 5 | Status: SHIPPED | OUTPATIENT
Start: 2021-04-21 | End: 2021-10-26 | Stop reason: SDUPTHER

## 2021-04-21 NOTE — PROGRESS NOTES
Subjective:   Anali Tellez is a 60 y.o. female here today for breathing difficulty, skin lesion    COPD without exacerbation (CMS-[facility])  She reports increased difficulty breathing.  Back in January near the end of the month, she went to urgent care with a COPD exacerbation she was treated with prednisone.  She reported in early February that after finishing the prednisone she was still having quite a bit of difficulty breathing and we had extended out her taper.  She comes in today and reports that it is still hard for her to breathe.  She is having a cough intermittently.  She is using her Spiriva daily but also her albuterol multiple times per day.  She has not been able to get Symbicort.  She denies fevers, chills.  States it is difficult for her to walk more than about half a block without becoming short of breath and she is unable to climb more than 1 flight of stairs without becoming short of breath.  Unfortunately, she continues to smoke about 4 cigarettes a day but more when she is stressed.  She has used nicotine patches when she was hospitalized and reports that this really helped cut her cravings but she is not interested in quitting smoking currently.      Seborrheic keratoses  She reports a rough raised bump in between her shoulder blades about the bra level that gets irritated and sometimes painful.  She scratches it and rubs on her clothing.  She is interested in having it removed.       Current medicines (including changes today)  Current Outpatient Medications   Medication Sig Dispense Refill   • Fluticasone-Umeclidin-Vilant (TRELEGY ELLIPTA) 100-62.5-25 MCG/INH AEROSOL POWDER, BREATH ACTIVATED Inhale 1 puff by mouth every day. 60 Each 5   • [START ON 4/23/2021] HYDROcodone/acetaminophen (NORCO)  MG Tab Take 1 tablet by mouth twice a day (DNF 4/23/21) 60 tablet 0   • albuterol (PROVENTIL) 2.5mg/3ml Nebu Soln solution for nebulization Take 3 mL by nebulization every four hours as needed  for Shortness of Breath. 30 Bullet 5   • albuterol 108 (90 Base) MCG/ACT Aero Soln inhalation aerosol Inhale 2 Puffs by mouth every 6 hours as needed for Shortness of Breath. 8.5 g 11   • triamterene-hydrochlorothiazide (MAXZIDE 75-50) 75-50 MG Tab TAKE 1 TABLET ORALLY EVERY MORNING 30 Tab 3   • methocarbamol (ROBAXIN) 500 MG Tab Take 2 Tabs by mouth every bedtime. 60 Tab 5   • gabapentin (NEURONTIN) 300 MG Cap Take 1 capsule by mouth once daily at bedtime. 30 Cap 5   • mometasone (ELOCON) 0.1 % Cream Apply thin layer to rash on hands twice daily as needed until resolved 45 g 2   • vitamin D, Ergocalciferol, (DRISDOL) 1.25 MG (67609 UT) Cap capsule Take 1 Cap by mouth every 7 days. 4 Cap 5   • DOCUSATE SODIUM PO Take  by mouth.     • Prenatal MV-Min-Fe Fum-FA-DHA (PRENATAL 1 PO) Take  by mouth.     • omeprazole (PRILOSEC) 20 MG Tablet Delayed Response delayed-release tablet TAKE 1 TABLET ORALLY ONCE DAILY 28 Tab 5     No current facility-administered medications for this visit.     She  has a past medical history of Chronic obstructive pulmonary disease (HCC), Hypertension, Low back pain, and Rash (7/26/2017).    ROS   As above in HPI     Objective:     Vitals:    04/21/21 1453   BP: 118/64   Pulse: 76   Resp: 16   Temp: 36.3 °C (97.3 °F)   SpO2: 97%     Body mass index is 30.14 kg/m².   Physical Exam:  Constitutional: Alert, no distress.  Skin: Warm, dry, good turgor, approximately 4 mm raised seborrheic keratosis that is somewhat irritated over her mid back at about the bra line.  Eye: Equal, round and reactive, conjunctiva clear, lids normal.  Respiratory: Unlabored respiratory effort, lungs clear bilaterally however reduced air movement  Cardiovascular: Regular rate and rhythm, no murmurs appreciated, no lower extremity edema  Psych: Alert and oriented x3, normal affect and mood.    CRYOTHERAPY:  Discussed risks and benefits of cryotherapy. Patient verbally agreed. 3 applications of cryotherapy were applied to  SK lesion on back. Patient tolerated procedure well. Aftercare instructions given.      Assessment and Plan:   The following treatment plan was discussed    1. COPD without exacerbation (HCC)  Uncontrolled.  She is not currently on a long-acting beta agonist or inhaled corticosteroid.  We will start her on Trelegy in place of her Spiriva.  She will follow-up with me on my chart in 1 month if no improvement in symptoms.  At that point would consider repeat PFTs and possible palm referral.  - Fluticasone-Umeclidin-Vilant (TRELEGY ELLIPTA) 100-62.5-25 MCG/INH AEROSOL POWDER, BREATH ACTIVATED; Inhale 1 puff by mouth every day.  Dispense: 60 Each; Refill: 5    2. Seborrheic keratosis  Treated with cryotherapy today.  Patient instructed to follow-up if the lesion returns at which point would consider excisional biopsy      Followup: Return in about 4 weeks (around 5/19/2021), or if symptoms worsen or fail to improve.

## 2021-04-21 NOTE — ASSESSMENT & PLAN NOTE
She reports a rough raised bump in between her shoulder blades about the bra level that gets irritated and sometimes painful.  She scratches it and rubs on her clothing.  She is interested in having it removed.

## 2021-04-21 NOTE — ASSESSMENT & PLAN NOTE
She reports increased difficulty breathing.  Back in January near the end of the month, she went to urgent care with a COPD exacerbation she was treated with prednisone.  She reported in early February that after finishing the prednisone she was still having quite a bit of difficulty breathing and we had extended out her taper.  She comes in today and reports that it is still hard for her to breathe.  She is having a cough intermittently.  She is using her Spiriva daily but also her albuterol multiple times per day.  She has not been able to get Symbicort.  She denies fevers, chills.  States it is difficult for her to walk more than about half a block without becoming short of breath and she is unable to climb more than 1 flight of stairs without becoming short of breath.  Unfortunately, she continues to smoke about 4 cigarettes a day but more when she is stressed.  She has used nicotine patches when she was hospitalized and reports that this really helped cut her cravings but she is not interested in quitting smoking currently.

## 2021-04-22 PROCEDURE — RXMED WILLOW AMBULATORY MEDICATION CHARGE: Performed by: INTERNAL MEDICINE

## 2021-04-23 PROCEDURE — RXMED WILLOW AMBULATORY MEDICATION CHARGE: Performed by: NURSE PRACTITIONER

## 2021-04-26 ENCOUNTER — PHARMACY VISIT (OUTPATIENT)
Dept: PHARMACY | Facility: MEDICAL CENTER | Age: 61
End: 2021-04-26
Payer: COMMERCIAL

## 2021-05-08 ENCOUNTER — PATIENT MESSAGE (OUTPATIENT)
Dept: MEDICAL GROUP | Facility: MEDICAL CENTER | Age: 61
End: 2021-05-08

## 2021-05-08 DIAGNOSIS — J44.9 COPD WITHOUT EXACERBATION (HCC): ICD-10-CM

## 2021-05-20 ENCOUNTER — PHARMACY VISIT (OUTPATIENT)
Dept: PHARMACY | Facility: MEDICAL CENTER | Age: 61
End: 2021-05-20
Payer: COMMERCIAL

## 2021-05-20 PROCEDURE — RXMED WILLOW AMBULATORY MEDICATION CHARGE: Performed by: INTERNAL MEDICINE

## 2021-05-27 ENCOUNTER — PHARMACY VISIT (OUTPATIENT)
Dept: PHARMACY | Facility: MEDICAL CENTER | Age: 61
End: 2021-05-27
Payer: COMMERCIAL

## 2021-05-27 PROCEDURE — RXMED WILLOW AMBULATORY MEDICATION CHARGE: Performed by: INTERNAL MEDICINE

## 2021-05-27 PROCEDURE — RXMED WILLOW AMBULATORY MEDICATION CHARGE: Performed by: NURSE PRACTITIONER

## 2021-06-15 ENCOUNTER — PHARMACY VISIT (OUTPATIENT)
Dept: PHARMACY | Facility: MEDICAL CENTER | Age: 61
End: 2021-06-15
Payer: COMMERCIAL

## 2021-06-15 DIAGNOSIS — G89.29 CHRONIC BILATERAL LOW BACK PAIN WITH LEFT-SIDED SCIATICA: ICD-10-CM

## 2021-06-15 DIAGNOSIS — M54.42 CHRONIC BILATERAL LOW BACK PAIN WITH LEFT-SIDED SCIATICA: ICD-10-CM

## 2021-06-15 PROCEDURE — RXMED WILLOW AMBULATORY MEDICATION CHARGE: Performed by: INTERNAL MEDICINE

## 2021-06-16 RX ORDER — METHOCARBAMOL 500 MG/1
1000 TABLET, FILM COATED ORAL
Qty: 60 TABLET | Refills: 3 | Status: SHIPPED | OUTPATIENT
Start: 2021-06-16 | End: 2021-10-26 | Stop reason: SDUPTHER

## 2021-06-18 PROCEDURE — RXMED WILLOW AMBULATORY MEDICATION CHARGE: Performed by: INTERNAL MEDICINE

## 2021-06-25 ENCOUNTER — PHARMACY VISIT (OUTPATIENT)
Dept: PHARMACY | Facility: MEDICAL CENTER | Age: 61
End: 2021-06-25
Payer: COMMERCIAL

## 2021-06-25 PROCEDURE — RXMED WILLOW AMBULATORY MEDICATION CHARGE: Performed by: INTERNAL MEDICINE

## 2021-06-27 PROCEDURE — RXMED WILLOW AMBULATORY MEDICATION CHARGE: Performed by: NURSE PRACTITIONER

## 2021-06-28 ENCOUNTER — PHARMACY VISIT (OUTPATIENT)
Dept: PHARMACY | Facility: MEDICAL CENTER | Age: 61
End: 2021-06-28
Payer: COMMERCIAL

## 2021-06-28 RX ORDER — HYDROCODONE BITARTRATE AND ACETAMINOPHEN 10; 325 MG/1; MG/1
TABLET ORAL
Qty: 60 TABLET | Refills: 0 | OUTPATIENT
Start: 2021-07-27 | End: 2022-03-25

## 2021-07-13 PROCEDURE — RXMED WILLOW AMBULATORY MEDICATION CHARGE: Performed by: INTERNAL MEDICINE

## 2021-07-14 ENCOUNTER — PHARMACY VISIT (OUTPATIENT)
Dept: PHARMACY | Facility: MEDICAL CENTER | Age: 61
End: 2021-07-14
Payer: COMMERCIAL

## 2021-07-26 PROCEDURE — RXMED WILLOW AMBULATORY MEDICATION CHARGE: Performed by: INTERNAL MEDICINE

## 2021-07-27 ENCOUNTER — PHARMACY VISIT (OUTPATIENT)
Dept: PHARMACY | Facility: MEDICAL CENTER | Age: 61
End: 2021-07-27
Payer: COMMERCIAL

## 2021-07-27 PROCEDURE — RXMED WILLOW AMBULATORY MEDICATION CHARGE: Performed by: NURSE PRACTITIONER

## 2021-08-11 ENCOUNTER — PHARMACY VISIT (OUTPATIENT)
Dept: PHARMACY | Facility: MEDICAL CENTER | Age: 61
End: 2021-08-11
Payer: COMMERCIAL

## 2021-08-11 PROCEDURE — RXMED WILLOW AMBULATORY MEDICATION CHARGE: Performed by: INTERNAL MEDICINE

## 2021-08-26 DIAGNOSIS — M54.42 CHRONIC BILATERAL LOW BACK PAIN WITH LEFT-SIDED SCIATICA: ICD-10-CM

## 2021-08-26 DIAGNOSIS — G89.29 CHRONIC BILATERAL LOW BACK PAIN WITH LEFT-SIDED SCIATICA: ICD-10-CM

## 2021-08-26 PROCEDURE — RXMED WILLOW AMBULATORY MEDICATION CHARGE: Performed by: INTERNAL MEDICINE

## 2021-08-26 PROCEDURE — RXMED WILLOW AMBULATORY MEDICATION CHARGE: Performed by: NURSE PRACTITIONER

## 2021-08-27 ENCOUNTER — PHARMACY VISIT (OUTPATIENT)
Dept: PHARMACY | Facility: MEDICAL CENTER | Age: 61
End: 2021-08-27
Payer: COMMERCIAL

## 2021-08-30 PROCEDURE — RXMED WILLOW AMBULATORY MEDICATION CHARGE: Performed by: INTERNAL MEDICINE

## 2021-08-30 RX ORDER — GABAPENTIN 300 MG/1
300 CAPSULE ORAL
Qty: 30 CAPSULE | Refills: 5 | Status: SHIPPED | OUTPATIENT
Start: 2021-08-30 | End: 2022-02-24 | Stop reason: SDUPTHER

## 2021-09-01 ENCOUNTER — PHARMACY VISIT (OUTPATIENT)
Dept: PHARMACY | Facility: MEDICAL CENTER | Age: 61
End: 2021-09-01
Payer: COMMERCIAL

## 2021-09-15 PROCEDURE — RXMED WILLOW AMBULATORY MEDICATION CHARGE: Performed by: INTERNAL MEDICINE

## 2021-09-16 ENCOUNTER — PHARMACY VISIT (OUTPATIENT)
Dept: PHARMACY | Facility: MEDICAL CENTER | Age: 61
End: 2021-09-16
Payer: COMMERCIAL

## 2021-09-21 PROCEDURE — RXMED WILLOW AMBULATORY MEDICATION CHARGE: Performed by: INTERNAL MEDICINE

## 2021-09-24 ENCOUNTER — PHARMACY VISIT (OUTPATIENT)
Dept: PHARMACY | Facility: MEDICAL CENTER | Age: 61
End: 2021-09-24
Payer: COMMERCIAL

## 2021-09-24 PROCEDURE — RXMED WILLOW AMBULATORY MEDICATION CHARGE: Performed by: NURSE PRACTITIONER

## 2021-10-12 DIAGNOSIS — J44.9 COPD WITHOUT EXACERBATION (HCC): ICD-10-CM

## 2021-10-12 PROCEDURE — RXMED WILLOW AMBULATORY MEDICATION CHARGE: Performed by: INTERNAL MEDICINE

## 2021-10-12 NOTE — TELEPHONE ENCOUNTER
Received request via: Pharmacy    Was the patient seen in the last year in this department? Yes    Does the patient have an active prescription (recently filled or refills available) for medication(s) requested? No     Last seen 4/21/21.

## 2021-10-13 ENCOUNTER — PHARMACY VISIT (OUTPATIENT)
Dept: PHARMACY | Facility: MEDICAL CENTER | Age: 61
End: 2021-10-13
Payer: COMMERCIAL

## 2021-10-26 DIAGNOSIS — I10 ESSENTIAL HYPERTENSION: ICD-10-CM

## 2021-10-26 DIAGNOSIS — M54.42 CHRONIC BILATERAL LOW BACK PAIN WITH LEFT-SIDED SCIATICA: ICD-10-CM

## 2021-10-26 DIAGNOSIS — G89.29 CHRONIC BILATERAL LOW BACK PAIN WITH LEFT-SIDED SCIATICA: ICD-10-CM

## 2021-10-26 PROCEDURE — RXMED WILLOW AMBULATORY MEDICATION CHARGE: Performed by: INTERNAL MEDICINE

## 2021-10-26 PROCEDURE — RXMED WILLOW AMBULATORY MEDICATION CHARGE: Performed by: NURSE PRACTITIONER

## 2021-10-27 PROCEDURE — RXMED WILLOW AMBULATORY MEDICATION CHARGE: Performed by: INTERNAL MEDICINE

## 2021-10-27 RX ORDER — METHOCARBAMOL 500 MG/1
1000 TABLET, FILM COATED ORAL
Qty: 60 TABLET | Refills: 3 | Status: SHIPPED | OUTPATIENT
Start: 2021-10-27 | End: 2022-02-24 | Stop reason: SDUPTHER

## 2021-10-27 RX ORDER — TRIAMTERENE AND HYDROCHLOROTHIAZIDE 75; 50 MG/1; MG/1
TABLET ORAL
Qty: 30 TABLET | Refills: 5 | Status: SHIPPED | OUTPATIENT
Start: 2021-10-27 | End: 2022-04-18 | Stop reason: SDUPTHER

## 2021-10-28 ENCOUNTER — PHARMACY VISIT (OUTPATIENT)
Dept: PHARMACY | Facility: MEDICAL CENTER | Age: 61
End: 2021-10-28
Payer: COMMERCIAL

## 2021-11-12 ENCOUNTER — PHARMACY VISIT (OUTPATIENT)
Dept: PHARMACY | Facility: MEDICAL CENTER | Age: 61
End: 2021-11-12
Payer: COMMERCIAL

## 2021-11-12 PROCEDURE — RXMED WILLOW AMBULATORY MEDICATION CHARGE: Performed by: INTERNAL MEDICINE

## 2021-11-24 ENCOUNTER — TELEPHONE (OUTPATIENT)
Dept: MEDICAL GROUP | Facility: MEDICAL CENTER | Age: 61
End: 2021-11-24

## 2021-11-24 ENCOUNTER — PHARMACY VISIT (OUTPATIENT)
Dept: PHARMACY | Facility: MEDICAL CENTER | Age: 61
End: 2021-11-24
Payer: COMMERCIAL

## 2021-11-24 PROCEDURE — RXMED WILLOW AMBULATORY MEDICATION CHARGE: Performed by: INTERNAL MEDICINE

## 2021-11-24 NOTE — TELEPHONE ENCOUNTER
Patient came into office requesting a medication change. Patient stated her  Triamterene-hydrochlorothiazide (MAXZIDE 75-50) is dropping her bp too low. She has never had this problem before and she states she is not able to cut the pill in half only because on side of the tab is 75 and the other is 50. Patient would like a call back to see if she can get a different presciption.

## 2021-11-24 NOTE — TELEPHONE ENCOUNTER
Patient stopped me in the parking lot to discuss. states BP has been 110's/70's.  I told her that with her blood pressure in this range, she should not feel unwell.  I recommended she make an appointment for further evaluation and appears she is scheduled for 11/30.    aTta Gonsalez M.D.

## 2021-12-01 PROCEDURE — RXMED WILLOW AMBULATORY MEDICATION CHARGE: Performed by: INTERNAL MEDICINE

## 2021-12-06 ENCOUNTER — PHARMACY VISIT (OUTPATIENT)
Dept: PHARMACY | Facility: MEDICAL CENTER | Age: 61
End: 2021-12-06
Payer: COMMERCIAL

## 2021-12-06 PROCEDURE — RXMED WILLOW AMBULATORY MEDICATION CHARGE: Performed by: NURSE PRACTITIONER

## 2021-12-20 ENCOUNTER — PHARMACY VISIT (OUTPATIENT)
Dept: PHARMACY | Facility: MEDICAL CENTER | Age: 61
End: 2021-12-20
Payer: COMMERCIAL

## 2021-12-20 PROCEDURE — RXMED WILLOW AMBULATORY MEDICATION CHARGE: Performed by: INTERNAL MEDICINE

## 2022-01-03 ENCOUNTER — PHARMACY VISIT (OUTPATIENT)
Dept: PHARMACY | Facility: MEDICAL CENTER | Age: 62
End: 2022-01-03
Payer: COMMERCIAL

## 2022-01-03 PROCEDURE — RXMED WILLOW AMBULATORY MEDICATION CHARGE: Performed by: INTERNAL MEDICINE

## 2022-01-05 PROCEDURE — RXMED WILLOW AMBULATORY MEDICATION CHARGE: Performed by: NURSE PRACTITIONER

## 2022-01-06 ENCOUNTER — PHARMACY VISIT (OUTPATIENT)
Dept: PHARMACY | Facility: MEDICAL CENTER | Age: 62
End: 2022-01-06
Payer: COMMERCIAL

## 2022-01-23 PROCEDURE — RXMED WILLOW AMBULATORY MEDICATION CHARGE: Performed by: INTERNAL MEDICINE

## 2022-01-24 ENCOUNTER — PHARMACY VISIT (OUTPATIENT)
Dept: PHARMACY | Facility: MEDICAL CENTER | Age: 62
End: 2022-01-24
Payer: COMMERCIAL

## 2022-02-03 PROCEDURE — RXMED WILLOW AMBULATORY MEDICATION CHARGE: Performed by: INTERNAL MEDICINE

## 2022-02-04 ENCOUNTER — PHARMACY VISIT (OUTPATIENT)
Dept: PHARMACY | Facility: MEDICAL CENTER | Age: 62
End: 2022-02-04
Payer: COMMERCIAL

## 2022-02-04 DIAGNOSIS — J44.9 COPD WITHOUT EXACERBATION (HCC): ICD-10-CM

## 2022-02-04 PROCEDURE — RXMED WILLOW AMBULATORY MEDICATION CHARGE: Performed by: NURSE PRACTITIONER

## 2022-02-04 PROCEDURE — RXMED WILLOW AMBULATORY MEDICATION CHARGE: Performed by: INTERNAL MEDICINE

## 2022-02-07 ENCOUNTER — PHARMACY VISIT (OUTPATIENT)
Dept: PHARMACY | Facility: MEDICAL CENTER | Age: 62
End: 2022-02-07
Payer: COMMERCIAL

## 2022-02-24 DIAGNOSIS — G89.29 CHRONIC BILATERAL LOW BACK PAIN WITH LEFT-SIDED SCIATICA: ICD-10-CM

## 2022-02-24 DIAGNOSIS — M54.42 CHRONIC BILATERAL LOW BACK PAIN WITH LEFT-SIDED SCIATICA: ICD-10-CM

## 2022-02-24 PROCEDURE — RXMED WILLOW AMBULATORY MEDICATION CHARGE: Performed by: INTERNAL MEDICINE

## 2022-02-24 RX ORDER — METHOCARBAMOL 500 MG/1
1000 TABLET, FILM COATED ORAL
Qty: 60 TABLET | Refills: 2 | Status: SHIPPED | OUTPATIENT
Start: 2022-02-24 | End: 2022-05-31 | Stop reason: SDUPTHER

## 2022-02-24 RX ORDER — GABAPENTIN 300 MG/1
300 CAPSULE ORAL
Qty: 30 CAPSULE | Refills: 2 | Status: SHIPPED | OUTPATIENT
Start: 2022-02-24 | End: 2022-05-31 | Stop reason: SDUPTHER

## 2022-02-24 NOTE — TELEPHONE ENCOUNTER
Received request via: Pharmacy    Was the patient seen in the last year in this department? Yes    Does the patient have an active prescription (recently filled or refills available) for medication(s) requested? No     Last visit 4/21/21

## 2022-02-25 ENCOUNTER — TELEPHONE (OUTPATIENT)
Dept: MEDICAL GROUP | Facility: MEDICAL CENTER | Age: 62
End: 2022-02-25
Payer: MEDICARE

## 2022-02-25 NOTE — TELEPHONE ENCOUNTER
VOICEMAIL  1. Caller Name: Anali Shanelle Ellison                          Call Back Number: [phone] (home)       2. Message: called pt to make an appointment. Pt mention she left a voicemail yesterday early in the morning about having to take trelegy 2 puffs instead of 1 puff. Pt stated she feels her chest tight at night and wanted to see if that would work. Please advice     3. Patient approves office to leave a detailed voicemail/MyChart message: N\A

## 2022-02-25 NOTE — TELEPHONE ENCOUNTER
Phone Number Called: [phone] (home)       Call outcome: Spoke to patient regarding message below.    Message: APPOINTMENT SHEDULED

## 2022-02-26 NOTE — TELEPHONE ENCOUNTER
Please let her know she should not exceed 1 puff on the Trelegy.  If she feels like her chest is still tight, I would recommend she uses her albuterol.  We can discuss more at her next visit.    Tata Gonsalez M.D.

## 2022-02-28 ENCOUNTER — PHARMACY VISIT (OUTPATIENT)
Dept: PHARMACY | Facility: MEDICAL CENTER | Age: 62
End: 2022-02-28
Payer: COMMERCIAL

## 2022-03-03 DIAGNOSIS — L30.8 OTHER ECZEMA: ICD-10-CM

## 2022-03-03 PROCEDURE — RXMED WILLOW AMBULATORY MEDICATION CHARGE: Performed by: INTERNAL MEDICINE

## 2022-03-03 RX ORDER — MOMETASONE FUROATE 1 MG/G
CREAM TOPICAL
Qty: 45 G | Refills: 2 | Status: SHIPPED | OUTPATIENT
Start: 2022-03-03

## 2022-03-04 ENCOUNTER — PHARMACY VISIT (OUTPATIENT)
Dept: PHARMACY | Facility: MEDICAL CENTER | Age: 62
End: 2022-03-04
Payer: COMMERCIAL

## 2022-03-04 PROCEDURE — RXMED WILLOW AMBULATORY MEDICATION CHARGE: Performed by: INTERNAL MEDICINE

## 2022-03-07 ENCOUNTER — PHARMACY VISIT (OUTPATIENT)
Dept: PHARMACY | Facility: MEDICAL CENTER | Age: 62
End: 2022-03-07
Payer: COMMERCIAL

## 2022-03-07 PROCEDURE — RXMED WILLOW AMBULATORY MEDICATION CHARGE: Performed by: NURSE PRACTITIONER

## 2022-03-25 ENCOUNTER — OFFICE VISIT (OUTPATIENT)
Dept: MEDICAL GROUP | Facility: MEDICAL CENTER | Age: 62
End: 2022-03-25
Attending: INTERNAL MEDICINE
Payer: MEDICARE

## 2022-03-25 VITALS
OXYGEN SATURATION: 97 % | TEMPERATURE: 96 F | DIASTOLIC BLOOD PRESSURE: 72 MMHG | BODY MASS INDEX: 28.93 KG/M2 | HEIGHT: 66 IN | SYSTOLIC BLOOD PRESSURE: 120 MMHG | WEIGHT: 180 LBS | RESPIRATION RATE: 16 BRPM | HEART RATE: 80 BPM

## 2022-03-25 DIAGNOSIS — L30.8 OTHER ECZEMA: ICD-10-CM

## 2022-03-25 DIAGNOSIS — Z13.220 SCREENING, LIPID: ICD-10-CM

## 2022-03-25 DIAGNOSIS — J44.9 COPD WITHOUT EXACERBATION (HCC): ICD-10-CM

## 2022-03-25 DIAGNOSIS — R73.03 PRE-DIABETES: ICD-10-CM

## 2022-03-25 DIAGNOSIS — G89.29 CHRONIC BILATERAL LOW BACK PAIN WITH LEFT-SIDED SCIATICA: ICD-10-CM

## 2022-03-25 DIAGNOSIS — M54.42 CHRONIC BILATERAL LOW BACK PAIN WITH LEFT-SIDED SCIATICA: ICD-10-CM

## 2022-03-25 DIAGNOSIS — I10 PRIMARY HYPERTENSION: ICD-10-CM

## 2022-03-25 DIAGNOSIS — E55.9 VITAMIN D DEFICIENCY: ICD-10-CM

## 2022-03-25 DIAGNOSIS — Z72.0 TOBACCO USE: ICD-10-CM

## 2022-03-25 DIAGNOSIS — K59.03 DRUG-INDUCED CONSTIPATION: ICD-10-CM

## 2022-03-25 DIAGNOSIS — Z11.59 SCREENING FOR VIRAL DISEASE: ICD-10-CM

## 2022-03-25 PROCEDURE — 99214 OFFICE O/P EST MOD 30 MIN: CPT | Performed by: INTERNAL MEDICINE

## 2022-03-25 PROCEDURE — 99212 OFFICE O/P EST SF 10 MIN: CPT | Performed by: INTERNAL MEDICINE

## 2022-03-25 RX ORDER — POLYETHYLENE GLYCOL 3350 17 G/17G
17-34 POWDER, FOR SOLUTION ORAL DAILY
Qty: 1020 G | Refills: 3 | Status: SHIPPED | OUTPATIENT
Start: 2022-03-25 | End: 2023-02-08

## 2022-03-25 RX ORDER — BISACODYL 5 MG
5 TABLET, DELAYED RELEASE (ENTERIC COATED) ORAL
COMMUNITY
End: 2023-02-08

## 2022-03-25 NOTE — ASSESSMENT & PLAN NOTE
"She continues on the Trelegy daily.  She reports good control of her breathing on this regimen.  She continues to smoke about half pack per day and she is not interested in quitting at this time.  She reports needing to use her albuterol inhaler about 1 time a day, typically before bed because she feels \"chest heaviness\".  She reports resolution of symptoms after she uses her albuterol.  "

## 2022-03-25 NOTE — ASSESSMENT & PLAN NOTE
She continues under the care of pain management.  She is taking hydrocodone 10-3 25 twice daily and she is getting injections through their office.  She is also using methocarbamol as needed

## 2022-03-25 NOTE — ASSESSMENT & PLAN NOTE
Last A1c was checked in 2019 mildly elevated at 5.8.  She is not currently on any medication for glycemic control.

## 2022-03-25 NOTE — ASSESSMENT & PLAN NOTE
She reports worsening constipation.  Without taking any over-the-counter medication, she may have a bowel movement once every 2 weeks.  She has been using Dulcolax and she states that this will produce a bowel movement that day but if she skips it for a few days she will go back to not having bowel movements.  She has tried increasing her fiber and she reports staying well-hydrated.  She has not tried any other laxatives.  When she does have a bowel movement, reports her stools are hard and she has to strain.  She will sometimes see some bright red blood and have a little bit of associated pain.

## 2022-03-25 NOTE — ASSESSMENT & PLAN NOTE
Has been using the mometasone cream on her hands regularly when she notices flareups of her eczema and she reports good control of her symptoms with this medication.

## 2022-03-25 NOTE — PROGRESS NOTES
"Subjective:   Anali Tellez is a 61 y.o. female here today for follow-up chronic medical problems as below    COPD without exacerbation (CMS-HCC)  She continues on the Trelegy daily.  She reports good control of her breathing on this regimen.  She continues to smoke about half pack per day and she is not interested in quitting at this time.  She reports needing to use her albuterol inhaler about 1 time a day, typically before bed because she feels \"chest heaviness\".  She reports resolution of symptoms after she uses her albuterol.    Drug-induced constipation  She reports worsening constipation.  Without taking any over-the-counter medication, she may have a bowel movement once every 2 weeks.  She has been using Dulcolax and she states that this will produce a bowel movement that day but if she skips it for a few days she will go back to not having bowel movements.  She has tried increasing her fiber and she reports staying well-hydrated.  She has not tried any other laxatives.  When she does have a bowel movement, reports her stools are hard and she has to strain.  She will sometimes see some bright red blood and have a little bit of associated pain.    Pre-diabetes  Last A1c was checked in 2019 mildly elevated at 5.8.  She is not currently on any medication for glycemic control.    Tobacco use  Currently smoking about half pack per day.  Not interested in quitting at this time.    Vitamin D deficiency  Reports that she has been off of her vitamin D lately because insurance would not cover it.    Chronic bilateral low back pain with left-sided sciatica  She continues under the care of pain management.  She is taking hydrocodone 10-3 25 twice daily and she is getting injections through their office.  She is also using methocarbamol as needed    HTN (hypertension)  She continues on triamterene-HCTZ 75-50.  Reports her blood pressures have been well controlled.    Eczema  Has been using the mometasone cream on " her hands regularly when she notices flareups of her eczema and she reports good control of her symptoms with this medication.       Current medicines (including changes today)  Current Outpatient Medications   Medication Sig Dispense Refill   • polyethylene glycol 3350 (MIRALAX) 17 GM/SCOOP Powder Mix 1 to 2 capfuls in 8 ounces of liquid and take by mouth daily. 1020 g 3   • bisacodyl EC (DULCOLAX) 5 MG Tablet Delayed Response Take 5 mg by mouth 1 time a day as needed.     • [START ON 4/6/2022] HYDROcodone/acetaminophen (NORCO)  MG Tab Take 1 Tablet by mouth 2 times a day. 60 Tablet 0   • mometasone (ELOCON) 0.1 % Cream Apply thin layer to rash on hands twice daily as needed until resolved 45 g 2   • gabapentin (NEURONTIN) 300 MG Cap Take 1 capsule by mouth once daily at bedtime. 30 Capsule 2   • methocarbamol (ROBAXIN) 500 MG Tab Take 2 Tabs by mouth every bedtime. 60 Tablet 2   • Fluticasone-Umeclidin-Vilant (TRELEGY ELLIPTA) 100-62.5-25 MCG/INH AEROSOL POWDER, BREATH ACTIVATED inhalation Inhale 1 puff by mouth every day. 60 Each 5   • triamterene-hydrochlorothiazide (MAXZIDE 75-50) 75-50 MG Tab TAKE 1 TABLET ORALLY EVERY MORNING 30 Tablet 5   • albuterol (PROVENTIL) 2.5mg/3ml Nebu Soln solution for nebulization Take 3 mL by nebulization every four hours as needed for Shortness of Breath. 30 Bullet 5   • albuterol 108 (90 Base) MCG/ACT Aero Soln inhalation aerosol Inhale 2 Puffs by mouth every 6 hours as needed for Shortness of Breath. 8.5 g 11   • DOCUSATE SODIUM PO Take  by mouth.     • Prenatal MV-Min-Fe Fum-FA-DHA (PRENATAL 1 PO) Take  by mouth.     • omeprazole (PRILOSEC) 20 MG Tablet Delayed Response delayed-release tablet TAKE 1 TABLET ORALLY ONCE DAILY 28 Tab 5     No current facility-administered medications for this visit.     She  has a past medical history of Chronic obstructive pulmonary disease (HCC), Hypertension, Low back pain, and Rash (7/26/2017).         Objective:     Vitals:    03/25/22  0842   BP: 120/72   Pulse: 80   Resp: 16   Temp: (!) 35.6 °C (96 °F)   SpO2: 97%     Body mass index is 29.49 kg/m².   Physical Exam:  Constitutional: Alert, no distress.  Skin: Warm, dry, good turgor, no rashes in visible areas.  Eye: Equal, round and reactive, conjunctiva clear, lids normal.  ENMT: Lips without lesions, fair dentition, oropharynx clear, TM's clear bilaterally  Neck: Trachea midline, no masses, no thyromegaly. No cervical or supraclavicular lymphadenopathy  Respiratory: Unlabored respiratory effort, lungs clear to auscultation, no wheezes, no ronchi.  Cardiovascular: Regular rate and rhythm, no murmurs appreciated, no lower extremity edema, varicose veins present  Abdomen: Soft, non-tender, no masses, no hepatosplenomegaly.  Psych: Alert and oriented x3, normal affect and mood.      Assessment and Plan:   The following treatment plan was discussed    1. COPD without exacerbation (HCC)  Stable, controlled with current medication.  We will continue to encourage smoking cessation.  -Continue with Trelegy and albuterol as needed    2. Vitamin D deficiency  Currently off of supplementation  - VITAMIN D,25 HYDROXY; Future    3. Pre-diabetes  - HEMOGLOBIN A1C; Future    4. Primary hypertension  Stable, controlled with current medication  -Continue triamterene-HCTZ 75-50 mg daily  - Comp Metabolic Panel; Future    5. Drug-induced constipation  Uncontrolled.  We discussed adding MiraLAX daily to her regimen.  She can then use the Dulcolax as needed if she does not have a bowel movement in 1 to 2 days.  We discussed adjusting her dose of MiraLAX as needed.  -May continue with Dulcolax as needed  -Discussed increasing dietary fiber and maintaining good hydration  - polyethylene glycol 3350 (MIRALAX) 17 GM/SCOOP Powder; Mix 1 to 2 capfuls in 8 ounces of liquid and take by mouth daily.  Dispense: 1020 g; Refill: 3    6. Screening, lipid  - Lipid Profile; Future    7. Screening for viral disease  - HCV Scrn (  5524-5201 1xLife); Future    8. Tobacco use  Not interested in quitting at this time.  We will continue to encourage cessation.    9. Chronic bilateral low back pain with left-sided sciatica  Stable, she will continue follow-up with pain management and current medication    10. Other eczema  Stable and controlled with mometasone which she will continue as needed        Followup: Return in about 1 year (around 3/25/2023), or if symptoms worsen or fail to improve, for annual.

## 2022-03-29 PROCEDURE — RXMED WILLOW AMBULATORY MEDICATION CHARGE: Performed by: INTERNAL MEDICINE

## 2022-03-30 ENCOUNTER — PHARMACY VISIT (OUTPATIENT)
Dept: PHARMACY | Facility: MEDICAL CENTER | Age: 62
End: 2022-03-30
Payer: COMMERCIAL

## 2022-04-04 ENCOUNTER — HOSPITAL ENCOUNTER (OUTPATIENT)
Dept: LAB | Facility: MEDICAL CENTER | Age: 62
End: 2022-04-04
Attending: INTERNAL MEDICINE
Payer: MEDICARE

## 2022-04-04 DIAGNOSIS — E55.9 VITAMIN D DEFICIENCY: ICD-10-CM

## 2022-04-04 DIAGNOSIS — Z13.220 SCREENING, LIPID: ICD-10-CM

## 2022-04-04 DIAGNOSIS — I10 PRIMARY HYPERTENSION: ICD-10-CM

## 2022-04-04 DIAGNOSIS — R73.03 PRE-DIABETES: ICD-10-CM

## 2022-04-04 DIAGNOSIS — Z11.59 SCREENING FOR VIRAL DISEASE: ICD-10-CM

## 2022-04-04 LAB
25(OH)D3 SERPL-MCNC: 47 NG/ML (ref 30–100)
ALBUMIN SERPL BCP-MCNC: 4.2 G/DL (ref 3.2–4.9)
ALBUMIN/GLOB SERPL: 1.4 G/DL
ALP SERPL-CCNC: 83 U/L (ref 30–99)
ALT SERPL-CCNC: 13 U/L (ref 2–50)
ANION GAP SERPL CALC-SCNC: 11 MMOL/L (ref 7–16)
AST SERPL-CCNC: 18 U/L (ref 12–45)
BILIRUB SERPL-MCNC: 0.4 MG/DL (ref 0.1–1.5)
BUN SERPL-MCNC: 15 MG/DL (ref 8–22)
CALCIUM SERPL-MCNC: 10.1 MG/DL (ref 8.5–10.5)
CHLORIDE SERPL-SCNC: 105 MMOL/L (ref 96–112)
CHOLEST SERPL-MCNC: 147 MG/DL (ref 100–199)
CO2 SERPL-SCNC: 26 MMOL/L (ref 20–33)
CREAT SERPL-MCNC: 1 MG/DL (ref 0.5–1.4)
FASTING STATUS PATIENT QL REPORTED: NORMAL
GFR SERPLBLD CREATININE-BSD FMLA CKD-EPI: 63 ML/MIN/1.73 M 2
GLOBULIN SER CALC-MCNC: 2.9 G/DL (ref 1.9–3.5)
GLUCOSE SERPL-MCNC: 93 MG/DL (ref 65–99)
HCV AB SER QL: NORMAL
HDLC SERPL-MCNC: 61 MG/DL
LDLC SERPL CALC-MCNC: 71 MG/DL
POTASSIUM SERPL-SCNC: 4.1 MMOL/L (ref 3.6–5.5)
PROT SERPL-MCNC: 7.1 G/DL (ref 6–8.2)
SODIUM SERPL-SCNC: 142 MMOL/L (ref 135–145)
TRIGL SERPL-MCNC: 75 MG/DL (ref 0–149)

## 2022-04-04 PROCEDURE — 80061 LIPID PANEL: CPT

## 2022-04-04 PROCEDURE — 82306 VITAMIN D 25 HYDROXY: CPT

## 2022-04-04 PROCEDURE — 80053 COMPREHEN METABOLIC PANEL: CPT

## 2022-04-04 PROCEDURE — 36415 COLL VENOUS BLD VENIPUNCTURE: CPT | Mod: GA

## 2022-04-04 PROCEDURE — 83036 HEMOGLOBIN GLYCOSYLATED A1C: CPT | Mod: GA

## 2022-04-04 PROCEDURE — G0472 HEP C SCREEN HIGH RISK/OTHER: HCPCS | Mod: GA

## 2022-04-05 LAB
EST. AVERAGE GLUCOSE BLD GHB EST-MCNC: 123 MG/DL
HBA1C MFR BLD: 5.9 % (ref 4–5.6)

## 2022-04-06 ENCOUNTER — PHARMACY VISIT (OUTPATIENT)
Dept: PHARMACY | Facility: MEDICAL CENTER | Age: 62
End: 2022-04-06
Payer: COMMERCIAL

## 2022-04-06 PROCEDURE — RXMED WILLOW AMBULATORY MEDICATION CHARGE: Performed by: NURSE PRACTITIONER

## 2022-04-18 DIAGNOSIS — I10 ESSENTIAL HYPERTENSION: ICD-10-CM

## 2022-04-19 RX ORDER — TRIAMTERENE AND HYDROCHLOROTHIAZIDE 75; 50 MG/1; MG/1
TABLET ORAL
Qty: 30 TABLET | Refills: 5 | Status: SHIPPED | OUTPATIENT
Start: 2022-04-19 | End: 2022-10-31 | Stop reason: SDUPTHER

## 2022-04-20 ENCOUNTER — PHARMACY VISIT (OUTPATIENT)
Dept: PHARMACY | Facility: MEDICAL CENTER | Age: 62
End: 2022-04-20
Payer: COMMERCIAL

## 2022-04-20 PROCEDURE — RXMED WILLOW AMBULATORY MEDICATION CHARGE: Performed by: INTERNAL MEDICINE

## 2022-05-03 ENCOUNTER — OFFICE VISIT (OUTPATIENT)
Dept: URGENT CARE | Facility: CLINIC | Age: 62
End: 2022-05-03
Payer: MEDICARE

## 2022-05-03 ENCOUNTER — PHARMACY VISIT (OUTPATIENT)
Dept: PHARMACY | Facility: MEDICAL CENTER | Age: 62
End: 2022-05-03
Payer: COMMERCIAL

## 2022-05-03 VITALS
HEART RATE: 79 BPM | HEIGHT: 65 IN | RESPIRATION RATE: 12 BRPM | TEMPERATURE: 96.5 F | BODY MASS INDEX: 29.46 KG/M2 | SYSTOLIC BLOOD PRESSURE: 130 MMHG | WEIGHT: 176.8 LBS | DIASTOLIC BLOOD PRESSURE: 74 MMHG | OXYGEN SATURATION: 95 %

## 2022-05-03 DIAGNOSIS — J44.1 CHRONIC OBSTRUCTIVE PULMONARY DISEASE WITH ACUTE EXACERBATION (HCC): ICD-10-CM

## 2022-05-03 DIAGNOSIS — R05.9 COUGH: ICD-10-CM

## 2022-05-03 PROCEDURE — RXMED WILLOW AMBULATORY MEDICATION CHARGE: Performed by: NURSE PRACTITIONER

## 2022-05-03 PROCEDURE — RXMED WILLOW AMBULATORY MEDICATION CHARGE: Performed by: INTERNAL MEDICINE

## 2022-05-03 PROCEDURE — 99214 OFFICE O/P EST MOD 30 MIN: CPT | Performed by: NURSE PRACTITIONER

## 2022-05-03 RX ORDER — AZITHROMYCIN 250 MG/1
TABLET, FILM COATED ORAL
Qty: 6 TABLET | Refills: 0 | Status: SHIPPED | OUTPATIENT
Start: 2022-05-03 | End: 2022-07-21

## 2022-05-03 ASSESSMENT — ENCOUNTER SYMPTOMS
CHILLS: 0
COUGH: 1
SORE THROAT: 0
EYE PAIN: 0
NAUSEA: 0
RHINORRHEA: 1
VOMITING: 0
SHORTNESS OF BREATH: 0
DIZZINESS: 0
HEADACHES: 0
FEVER: 0
MYALGIAS: 0

## 2022-05-03 NOTE — PROGRESS NOTES
Subjective:   Anali Tellez is a 61 y.o. female who presents for Nasal Congestion (Runny nose, wants to make sure everything is ok, patient has COPD x 1 week)      URI   This is a new problem. The current episode started in the past 7 days. The problem has been gradually worsening. There has been no fever. Associated symptoms include congestion, coughing and rhinorrhea. Pertinent negatives include no chest pain, ear pain, headaches, nausea, plugged ear sensation, rash, sore throat or vomiting. She has tried acetaminophen for the symptoms. The treatment provided no relief.       Review of Systems   Constitutional: Positive for malaise/fatigue. Negative for chills and fever.   HENT: Positive for congestion and rhinorrhea. Negative for ear pain and sore throat.    Eyes: Negative for pain.   Respiratory: Positive for cough. Negative for shortness of breath.    Cardiovascular: Negative for chest pain.   Gastrointestinal: Negative for nausea and vomiting.   Genitourinary: Negative for hematuria.   Musculoskeletal: Negative for myalgias.   Skin: Negative for rash.   Neurological: Negative for dizziness and headaches.       Medications:    • albuterol Aers  • albuterol Nebu  • azithromycin Tabs  • bisacodyl EC Tbec  • DOCUSATE SODIUM PO  • gabapentin Caps  • HYDROcodone/acetaminophen Tabs  • methocarbamol Tabs  • mometasone Crea  • polyethylene glycol 3350 Powd  • PRENATAL 1 PO  • Trelegy Ellipta Aepb  • triamterene-hydrochlorothiazide Tabs    Allergies: Patient has no known allergies.    Problem List: Anali Tellez does not have any pertinent problems on file.    Surgical History:  No past surgical history on file.    Past Social Hx: Anali Tellez  reports that she has been smoking cigarettes. She has a 16.00 pack-year smoking history. She has never used smokeless tobacco. She reports previous alcohol use of about 3.6 oz of alcohol per week. She reports that she does not use drugs.     Past Family  "Hx:  Anali Tellez family history includes Diabetes in her mother and sister; Heart Disease in her father and mother; Hypertension in her mother, sister, and sister; No Known Problems in her brother.     Problem list, medications, and allergies reviewed by myself today in Epic.     Objective:     /74 (BP Location: Left arm, Patient Position: Sitting, BP Cuff Size: Adult)   Pulse 79   Temp 35.8 °C (96.5 °F) (Temporal)   Resp 12   Ht 1.651 m (5' 5\")   Wt 80.2 kg (176 lb 12.8 oz)   LMP 05/19/2010   SpO2 95%   BMI 29.42 kg/m²     Physical Exam  Vitals and nursing note reviewed.   Constitutional:       General: She is not in acute distress.     Appearance: She is well-developed.   HENT:      Head: Normocephalic and atraumatic.      Right Ear: Tympanic membrane and external ear normal.      Left Ear: Tympanic membrane and external ear normal.      Nose: Nose normal.      Right Sinus: No maxillary sinus tenderness or frontal sinus tenderness.      Left Sinus: No maxillary sinus tenderness or frontal sinus tenderness.      Mouth/Throat:      Mouth: Mucous membranes are moist.      Pharynx: Uvula midline. No posterior oropharyngeal erythema.      Tonsils: No tonsillar exudate or tonsillar abscesses.   Eyes:      General:         Right eye: No discharge.         Left eye: No discharge.      Conjunctiva/sclera: Conjunctivae normal.   Cardiovascular:      Rate and Rhythm: Normal rate.   Pulmonary:      Effort: Pulmonary effort is normal. No respiratory distress.      Breath sounds: Normal breath sounds.   Abdominal:      General: There is no distension.   Musculoskeletal:         General: Normal range of motion.   Skin:     General: Skin is warm and dry.   Neurological:      General: No focal deficit present.      Mental Status: She is alert and oriented to person, place, and time. Mental status is at baseline.      Gait: Gait (gait at baseline) normal.   Psychiatric:         Judgment: Judgment normal. "         Assessment/Plan:     Diagnosis and associated orders:     1. Chronic obstructive pulmonary disease with acute exacerbation (HCC)  azithromycin (ZITHROMAX) 250 MG Tab   2. Cough  azithromycin (ZITHROMAX) 250 MG Tab        Comments/MDM:     • Patient is a 61-year-old female present with the stated above, patient having COPD with an acute exacerbation.  No wheezing noted will not treat with prednisone at today's visit.  Advised to continue supportive care with Tylenol and/or ibuprofen for fevers and discomfort. Increased fluids and electrolytes. Differential diagnosis, natural history, supportive care, and indications for immediate follow-up discussed.            Please note that this dictation was created using voice recognition software. I have made a reasonable attempt to correct obvious errors, but I expect that there are errors of grammar and possibly content that I did not discover before finalizing the note.    This note was electronically signed by Ramy VERDE.

## 2022-05-06 ENCOUNTER — PHARMACY VISIT (OUTPATIENT)
Dept: PHARMACY | Facility: MEDICAL CENTER | Age: 62
End: 2022-05-06
Payer: COMMERCIAL

## 2022-05-06 DIAGNOSIS — J44.9 COPD WITHOUT EXACERBATION (HCC): ICD-10-CM

## 2022-05-06 PROCEDURE — RXMED WILLOW AMBULATORY MEDICATION CHARGE: Performed by: INTERNAL MEDICINE

## 2022-05-06 PROCEDURE — RXMED WILLOW AMBULATORY MEDICATION CHARGE: Performed by: NURSE PRACTITIONER

## 2022-05-06 RX ORDER — ALBUTEROL SULFATE 90 UG/1
2 AEROSOL, METERED RESPIRATORY (INHALATION) EVERY 6 HOURS PRN
Qty: 8.5 G | Refills: 11 | Status: SHIPPED | OUTPATIENT
Start: 2022-05-06

## 2022-05-31 DIAGNOSIS — G89.29 CHRONIC BILATERAL LOW BACK PAIN WITH LEFT-SIDED SCIATICA: ICD-10-CM

## 2022-05-31 DIAGNOSIS — M54.42 CHRONIC BILATERAL LOW BACK PAIN WITH LEFT-SIDED SCIATICA: ICD-10-CM

## 2022-05-31 PROCEDURE — RXMED WILLOW AMBULATORY MEDICATION CHARGE: Performed by: INTERNAL MEDICINE

## 2022-05-31 RX ORDER — GABAPENTIN 300 MG/1
300 CAPSULE ORAL
Qty: 30 CAPSULE | Refills: 5 | Status: SHIPPED | OUTPATIENT
Start: 2022-05-31 | End: 2022-12-01 | Stop reason: SDUPTHER

## 2022-05-31 RX ORDER — METHOCARBAMOL 500 MG/1
1000 TABLET, FILM COATED ORAL
Qty: 60 TABLET | Refills: 5 | Status: SHIPPED | OUTPATIENT
Start: 2022-05-31 | End: 2022-12-01 | Stop reason: SDUPTHER

## 2022-06-01 ENCOUNTER — PHARMACY VISIT (OUTPATIENT)
Dept: PHARMACY | Facility: MEDICAL CENTER | Age: 62
End: 2022-06-01
Payer: COMMERCIAL
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
## 2022-06-05 PROCEDURE — RXMED WILLOW AMBULATORY MEDICATION CHARGE: Performed by: NURSE PRACTITIONER

## 2022-06-06 ENCOUNTER — PHARMACY VISIT (OUTPATIENT)
Dept: PHARMACY | Facility: MEDICAL CENTER | Age: 62
End: 2022-06-06
Payer: COMMERCIAL

## 2022-06-30 ENCOUNTER — PHARMACY VISIT (OUTPATIENT)
Dept: PHARMACY | Facility: MEDICAL CENTER | Age: 62
End: 2022-06-30
Payer: COMMERCIAL

## 2022-06-30 PROCEDURE — RXMED WILLOW AMBULATORY MEDICATION CHARGE: Performed by: INTERNAL MEDICINE

## 2022-07-05 ENCOUNTER — PHARMACY VISIT (OUTPATIENT)
Dept: PHARMACY | Facility: MEDICAL CENTER | Age: 62
End: 2022-07-05
Payer: COMMERCIAL

## 2022-07-05 PROCEDURE — RXMED WILLOW AMBULATORY MEDICATION CHARGE: Performed by: NURSE PRACTITIONER

## 2022-07-21 ENCOUNTER — PHARMACY VISIT (OUTPATIENT)
Dept: PHARMACY | Facility: MEDICAL CENTER | Age: 62
End: 2022-07-21
Payer: COMMERCIAL

## 2022-07-21 ENCOUNTER — OFFICE VISIT (OUTPATIENT)
Dept: MEDICAL GROUP | Facility: MEDICAL CENTER | Age: 62
End: 2022-07-21
Attending: INTERNAL MEDICINE
Payer: MEDICARE

## 2022-07-21 VITALS
SYSTOLIC BLOOD PRESSURE: 118 MMHG | HEART RATE: 80 BPM | RESPIRATION RATE: 16 BRPM | TEMPERATURE: 97.9 F | HEIGHT: 65 IN | WEIGHT: 184 LBS | BODY MASS INDEX: 30.66 KG/M2 | OXYGEN SATURATION: 96 % | DIASTOLIC BLOOD PRESSURE: 68 MMHG

## 2022-07-21 DIAGNOSIS — K59.03 DRUG-INDUCED CONSTIPATION: ICD-10-CM

## 2022-07-21 DIAGNOSIS — R42 DIZZINESS: ICD-10-CM

## 2022-07-21 DIAGNOSIS — J44.9 COPD WITHOUT EXACERBATION (HCC): ICD-10-CM

## 2022-07-21 DIAGNOSIS — R73.03 PRE-DIABETES: ICD-10-CM

## 2022-07-21 LAB
HBA1C MFR BLD: 5.7 % (ref 0–5.6)
INT CON NEG: NEGATIVE
INT CON POS: POSITIVE

## 2022-07-21 PROCEDURE — 99214 OFFICE O/P EST MOD 30 MIN: CPT | Performed by: INTERNAL MEDICINE

## 2022-07-21 PROCEDURE — 83036 HEMOGLOBIN GLYCOSYLATED A1C: CPT | Performed by: INTERNAL MEDICINE

## 2022-07-21 PROCEDURE — RXMED WILLOW AMBULATORY MEDICATION CHARGE: Performed by: INTERNAL MEDICINE

## 2022-07-21 PROCEDURE — 99212 OFFICE O/P EST SF 10 MIN: CPT | Performed by: INTERNAL MEDICINE

## 2022-07-21 RX ORDER — LINACLOTIDE 145 UG/1
1 CAPSULE, GELATIN COATED ORAL DAILY
Qty: 30 CAPSULE | Refills: 3 | Status: SHIPPED | OUTPATIENT
Start: 2022-07-21 | End: 2022-12-29 | Stop reason: SDUPTHER

## 2022-07-21 RX ORDER — MECLIZINE HCL 12.5 MG/1
12.5-25 TABLET ORAL 3 TIMES DAILY PRN
Qty: 30 TABLET | Refills: 0 | Status: SHIPPED | OUTPATIENT
Start: 2022-07-21 | End: 2023-02-08

## 2022-07-21 ASSESSMENT — PATIENT HEALTH QUESTIONNAIRE - PHQ9: CLINICAL INTERPRETATION OF PHQ2 SCORE: 0

## 2022-07-21 NOTE — ASSESSMENT & PLAN NOTE
She continues to struggle with constipation related to her opiate use.  She has been on hydrocodone for chronic pain through her pain management provider.  She has tried increasing her fiber, prunes, drinking plenty of water, MiraLAX, Dulcolax, Docusate, Senna, and lactulose.  None of these have been effective.  She states that she is having 3 small bowel movements a week that are painful and hard for her to push out.  She had some bleeding with a bowel movement about 4 weeks ago.

## 2022-07-21 NOTE — ASSESSMENT & PLAN NOTE
She has had intermittent bouts of vertigo in the past.  She reports that for the past 2 weeks she has felt like the room is spinning.  It is fairly constant throughout the day.  She denies nausea and vomiting but she does feel off balance.  She has not had any falls.  She denies any ear pain or pressure.  No recent upper respiratory tract infections.  In the past she has responded well to meclizine.

## 2022-07-21 NOTE — PROGRESS NOTES
Subjective:   Anali Tellez is a 62 y.o. female here today for dizziness, constipation, prediabetes    Vertigo  She has had intermittent bouts of vertigo in the past.  She reports that for the past 2 weeks she has felt like the room is spinning.  It is fairly constant throughout the day.  She denies nausea and vomiting but she does feel off balance.  She has not had any falls.  She denies any ear pain or pressure.  No recent upper respiratory tract infections.  In the past she has responded well to meclizine.     Drug-induced constipation  She continues to struggle with constipation related to her opiate use.  She has been on hydrocodone for chronic pain through her pain management provider.  She has tried increasing her fiber, prunes, drinking plenty of water, MiraLAX, Dulcolax, Docusate, Senna, and lactulose.  None of these have been effective.  She states that she is having 3 small bowel movements a week that are painful and hard for her to push out.  She had some bleeding with a bowel movement about 4 weeks ago.    Pre-diabetes  She has a long history of mild prediabetes with her last A1c checked in April at 5.9.       Current medicines (including changes today)  Current Outpatient Medications   Medication Sig Dispense Refill   • linaCLOtide (LINZESS) 145 MCG Cap Take 1 Capsule by mouth every day. 30 Capsule 3   • meclizine (ANTIVERT) 12.5 MG Tab Take 1-2 Tablets by mouth 3 times a day as needed for Dizziness. 30 Tablet 0   • Fluticasone-Umeclidin-Vilant (TRELEGY ELLIPTA) 100-62.5-25 MCG/INH AEROSOL POWDER, BREATH ACTIVATED inhalation Inhale 1 puff by mouth every day. 60 Each 5   • HYDROcodone/acetaminophen (NORCO)  MG Tab Take 1 Tablet by mouth 2 times a day for 30 days. 60 Tablet 0   • gabapentin (NEURONTIN) 300 MG Cap Take 1 capsule by mouth once daily at bedtime. 30 Capsule 5   • methocarbamol (ROBAXIN) 500 MG Tab Take 2 Tabs by mouth every bedtime. 60 Tablet 5   • albuterol 108 (90 Base)  MCG/ACT Aero Soln inhalation aerosol Inhale 2 Puffs by mouth every 6 hours as needed for Shortness of Breath. 8.5 g 11   • triamterene-hydrochlorothiazide (MAXZIDE 75-50) 75-50 MG Tab TAKE 1 TABLET ORALLY EVERY MORNING 30 Tablet 5   • polyethylene glycol 3350 (MIRALAX) 17 GM/SCOOP Powder Mix 1 to 2 capfuls in 8 ounces of liquid and take by mouth daily. 1020 g 3   • bisacodyl EC (DULCOLAX) 5 MG Tablet Delayed Response Take 5 mg by mouth 1 time a day as needed.     • HYDROcodone/acetaminophen (NORCO)  MG Tab Take 1 tablet by mouth 2 times a day. 60 Tablet 0   • mometasone (ELOCON) 0.1 % Cream Apply thin layer to rash on hands twice daily as needed until resolved 45 g 2   • albuterol (PROVENTIL) 2.5mg/3ml Nebu Soln solution for nebulization Take 3 mL by nebulization every four hours as needed for Shortness of Breath. 30 Bullet 5   • DOCUSATE SODIUM PO Take  by mouth.     • Prenatal MV-Min-Fe Fum-FA-DHA (PRENATAL 1 PO) Take  by mouth.       No current facility-administered medications for this visit.     She  has a past medical history of Chronic obstructive pulmonary disease (HCC), Hypertension, Low back pain, and Rash (7/26/2017).         Objective:     Vitals:    07/21/22 1310   BP: 118/68   Pulse: 80   Resp: 16   Temp: 36.6 °C (97.9 °F)   SpO2: 96%     Body mass index is 30.62 kg/m².   Physical Exam:  Constitutional: Alert, no distress.  Skin: Warm, dry, good turgor, no rashes in visible areas.  Eye: Equal, round and reactive, conjunctiva clear, lids normal.  ENMT:  TM's clear bilaterally  Neck: Trachea midline, no masses, no thyromegaly. No cervical or supraclavicular lymphadenopathy  Neuro: Positive Luis-Hallpike on right    Lab Results   Component Value Date/Time    HBA1C 5.7 (A) 07/21/2022 01:46 PM        Assessment and Plan:   The following treatment plan was discussed    1. Dizziness  History and exam are consistent with vertigo.  We discussed Epley maneuver and starting meclizine.  - meclizine (ANTIVERT)  12.5 MG Tab; Take 1-2 Tablets by mouth 3 times a day as needed for Dizziness.  Dispense: 30 Tablet; Refill: 0    2. Pre-diabetes  A1c has improved, continue healthy diet  - POCT  A1C    3. Drug-induced constipation  She has not responded to multiple osmotic and stimulant laxatives as listed above in HPI as well as stool softeners.  We discussed trying Linzess given her constipation is opiate induced.  - linaCLOtide (LINZESS) 145 MCG Cap; Take 1 Capsule by mouth every day.  Dispense: 30 Capsule; Refill: 3    4. COPD without exacerbation (HCC)  Stable and controlled with current meds which were refilled today  - Fluticasone-Umeclidin-Vilant (TRELEGY ELLIPTA) 100-62.5-25 MCG/INH AEROSOL POWDER, BREATH ACTIVATED inhalation; Inhale 1 puff by mouth every day.  Dispense: 60 Each; Refill: 5        Followup: Return in about 3 months (around 10/21/2022), or if symptoms worsen or fail to improve.

## 2022-08-02 ENCOUNTER — PHARMACY VISIT (OUTPATIENT)
Dept: PHARMACY | Facility: MEDICAL CENTER | Age: 62
End: 2022-08-02
Payer: COMMERCIAL

## 2022-08-02 PROCEDURE — RXMED WILLOW AMBULATORY MEDICATION CHARGE: Performed by: INTERNAL MEDICINE

## 2022-08-03 PROCEDURE — RXMED WILLOW AMBULATORY MEDICATION CHARGE: Performed by: NURSE PRACTITIONER

## 2022-08-05 ENCOUNTER — PHARMACY VISIT (OUTPATIENT)
Dept: PHARMACY | Facility: MEDICAL CENTER | Age: 62
End: 2022-08-05
Payer: COMMERCIAL

## 2022-09-01 PROCEDURE — RXMED WILLOW AMBULATORY MEDICATION CHARGE: Performed by: INTERNAL MEDICINE

## 2022-09-02 ENCOUNTER — PHARMACY VISIT (OUTPATIENT)
Dept: PHARMACY | Facility: MEDICAL CENTER | Age: 62
End: 2022-09-02
Payer: COMMERCIAL

## 2022-09-02 PROCEDURE — RXMED WILLOW AMBULATORY MEDICATION CHARGE: Performed by: NURSE PRACTITIONER

## 2022-09-30 PROCEDURE — RXMED WILLOW AMBULATORY MEDICATION CHARGE: Performed by: INTERNAL MEDICINE

## 2022-10-03 ENCOUNTER — PHARMACY VISIT (OUTPATIENT)
Dept: PHARMACY | Facility: MEDICAL CENTER | Age: 62
End: 2022-10-03
Payer: COMMERCIAL

## 2022-10-03 PROCEDURE — RXMED WILLOW AMBULATORY MEDICATION CHARGE: Performed by: NURSE PRACTITIONER

## 2022-10-31 DIAGNOSIS — I10 ESSENTIAL HYPERTENSION: ICD-10-CM

## 2022-10-31 PROCEDURE — RXMED WILLOW AMBULATORY MEDICATION CHARGE: Performed by: INTERNAL MEDICINE

## 2022-10-31 PROCEDURE — RXMED WILLOW AMBULATORY MEDICATION CHARGE: Performed by: NURSE PRACTITIONER

## 2022-11-01 PROCEDURE — RXMED WILLOW AMBULATORY MEDICATION CHARGE: Performed by: INTERNAL MEDICINE

## 2022-11-01 RX ORDER — TRIAMTERENE AND HYDROCHLOROTHIAZIDE 75; 50 MG/1; MG/1
TABLET ORAL
Qty: 30 TABLET | Refills: 3 | Status: SHIPPED | OUTPATIENT
Start: 2022-11-01 | End: 2023-03-02 | Stop reason: SDUPTHER

## 2022-11-02 ENCOUNTER — PHARMACY VISIT (OUTPATIENT)
Dept: PHARMACY | Facility: MEDICAL CENTER | Age: 62
End: 2022-11-02
Payer: COMMERCIAL

## 2022-11-02 PROCEDURE — RXMED WILLOW AMBULATORY MEDICATION CHARGE: Performed by: INTERNAL MEDICINE

## 2022-11-09 ENCOUNTER — PATIENT MESSAGE (OUTPATIENT)
Dept: HEALTH INFORMATION MANAGEMENT | Facility: OTHER | Age: 62
End: 2022-11-09

## 2022-12-01 DIAGNOSIS — M54.42 CHRONIC BILATERAL LOW BACK PAIN WITH LEFT-SIDED SCIATICA: ICD-10-CM

## 2022-12-01 DIAGNOSIS — G89.29 CHRONIC BILATERAL LOW BACK PAIN WITH LEFT-SIDED SCIATICA: ICD-10-CM

## 2022-12-01 PROCEDURE — RXMED WILLOW AMBULATORY MEDICATION CHARGE: Performed by: NURSE PRACTITIONER

## 2022-12-01 PROCEDURE — RXMED WILLOW AMBULATORY MEDICATION CHARGE: Performed by: INTERNAL MEDICINE

## 2022-12-01 RX ORDER — METHOCARBAMOL 500 MG/1
1000 TABLET, FILM COATED ORAL
Qty: 60 TABLET | Refills: 5 | Status: SHIPPED | OUTPATIENT
Start: 2022-12-01 | End: 2023-06-02 | Stop reason: SDUPTHER

## 2022-12-01 RX ORDER — GABAPENTIN 300 MG/1
300 CAPSULE ORAL
Qty: 30 CAPSULE | Refills: 5 | Status: SHIPPED | OUTPATIENT
Start: 2022-12-01 | End: 2023-06-02 | Stop reason: SDUPTHER

## 2022-12-02 ENCOUNTER — PHARMACY VISIT (OUTPATIENT)
Dept: PHARMACY | Facility: MEDICAL CENTER | Age: 62
End: 2022-12-02
Payer: COMMERCIAL

## 2022-12-02 PROCEDURE — RXMED WILLOW AMBULATORY MEDICATION CHARGE: Performed by: INTERNAL MEDICINE

## 2022-12-29 DIAGNOSIS — K59.03 DRUG-INDUCED CONSTIPATION: ICD-10-CM

## 2022-12-29 PROCEDURE — RXMED WILLOW AMBULATORY MEDICATION CHARGE: Performed by: INTERNAL MEDICINE

## 2023-01-01 PROCEDURE — RXMED WILLOW AMBULATORY MEDICATION CHARGE: Performed by: NURSE PRACTITIONER

## 2023-01-03 ENCOUNTER — PHARMACY VISIT (OUTPATIENT)
Dept: PHARMACY | Facility: MEDICAL CENTER | Age: 63
End: 2023-01-03
Payer: COMMERCIAL

## 2023-01-03 DIAGNOSIS — J44.9 COPD WITHOUT EXACERBATION (HCC): ICD-10-CM

## 2023-01-03 PROCEDURE — RXMED WILLOW AMBULATORY MEDICATION CHARGE: Performed by: INTERNAL MEDICINE

## 2023-01-03 RX ORDER — LINACLOTIDE 145 UG/1
1 CAPSULE, GELATIN COATED ORAL DAILY
Qty: 30 CAPSULE | Refills: 3 | Status: SHIPPED | OUTPATIENT
Start: 2023-01-03 | End: 2023-06-02 | Stop reason: SDUPTHER

## 2023-01-03 NOTE — TELEPHONE ENCOUNTER
Received request via: Pharmacy    Was the patient seen in the last year in this department? Yes    Does the patient have an active prescription (recently filled or refills available) for medication(s) requested? No    Does the patient have longterm Plus and need 100 day supply (blood pressure, diabetes and cholesterol meds only)? Patient does not have SCP

## 2023-01-04 RX ORDER — FLUTICASONE FUROATE, UMECLIDINIUM BROMIDE AND VILANTEROL TRIFENATATE 100; 62.5; 25 UG/1; UG/1; UG/1
1 POWDER RESPIRATORY (INHALATION) DAILY
Qty: 60 EACH | Refills: 5 | Status: SHIPPED | OUTPATIENT
Start: 2023-01-04 | End: 2023-02-08 | Stop reason: DRUGHIGH

## 2023-01-17 ENCOUNTER — PHARMACY VISIT (OUTPATIENT)
Dept: PHARMACY | Facility: MEDICAL CENTER | Age: 63
End: 2023-01-17
Payer: COMMERCIAL

## 2023-01-17 ENCOUNTER — OFFICE VISIT (OUTPATIENT)
Dept: MEDICAL GROUP | Facility: MEDICAL CENTER | Age: 63
End: 2023-01-17
Attending: FAMILY MEDICINE
Payer: MEDICARE

## 2023-01-17 ENCOUNTER — APPOINTMENT (OUTPATIENT)
Dept: URGENT CARE | Facility: CLINIC | Age: 63
End: 2023-01-17
Payer: MEDICARE

## 2023-01-17 VITALS
BODY MASS INDEX: 29.57 KG/M2 | WEIGHT: 184 LBS | DIASTOLIC BLOOD PRESSURE: 82 MMHG | TEMPERATURE: 96.6 F | HEIGHT: 66 IN | SYSTOLIC BLOOD PRESSURE: 124 MMHG | OXYGEN SATURATION: 95 % | RESPIRATION RATE: 14 BRPM | HEART RATE: 64 BPM

## 2023-01-17 DIAGNOSIS — J44.9 COPD WITHOUT EXACERBATION (HCC): ICD-10-CM

## 2023-01-17 DIAGNOSIS — R05.1 ACUTE COUGH: ICD-10-CM

## 2023-01-17 PROCEDURE — RXMED WILLOW AMBULATORY MEDICATION CHARGE: Performed by: FAMILY MEDICINE

## 2023-01-17 PROCEDURE — 99214 OFFICE O/P EST MOD 30 MIN: CPT | Performed by: FAMILY MEDICINE

## 2023-01-17 PROCEDURE — 700101 HCHG RX REV CODE 250: Performed by: FAMILY MEDICINE

## 2023-01-17 PROCEDURE — 99213 OFFICE O/P EST LOW 20 MIN: CPT | Performed by: FAMILY MEDICINE

## 2023-01-17 RX ORDER — PREDNISONE 20 MG/1
40 TABLET ORAL DAILY
Qty: 10 TABLET | Refills: 0 | Status: SHIPPED | OUTPATIENT
Start: 2023-01-17 | End: 2023-01-17

## 2023-01-17 RX ORDER — ALBUTEROL SULFATE 2.5 MG/3ML
2.5 SOLUTION RESPIRATORY (INHALATION) EVERY 4 HOURS PRN
Qty: 30 EACH | Refills: 5 | Status: SHIPPED | OUTPATIENT
Start: 2023-01-17 | End: 2023-02-08 | Stop reason: SDUPTHER

## 2023-01-17 RX ORDER — IPRATROPIUM BROMIDE AND ALBUTEROL SULFATE 2.5; .5 MG/3ML; MG/3ML
3 SOLUTION RESPIRATORY (INHALATION) 4 TIMES DAILY
Qty: 60 ML | Refills: 1 | Status: SHIPPED | OUTPATIENT
Start: 2023-01-17 | End: 2023-01-17

## 2023-01-17 RX ORDER — PREDNISONE 20 MG/1
40 TABLET ORAL DAILY
Qty: 10 TABLET | Refills: 0 | Status: SHIPPED | OUTPATIENT
Start: 2023-01-17 | End: 2023-01-23

## 2023-01-17 RX ORDER — IPRATROPIUM BROMIDE AND ALBUTEROL SULFATE 2.5; .5 MG/3ML; MG/3ML
3 SOLUTION RESPIRATORY (INHALATION) ONCE
Status: COMPLETED | OUTPATIENT
Start: 2023-01-17 | End: 2023-01-17

## 2023-01-17 RX ORDER — AZITHROMYCIN 250 MG/1
TABLET, FILM COATED ORAL
Qty: 6 TABLET | Refills: 0 | Status: SHIPPED | OUTPATIENT
Start: 2023-01-17 | End: 2023-02-08

## 2023-01-17 RX ADMIN — IPRATROPIUM BROMIDE AND ALBUTEROL SULFATE 3 ML: 2.5; .5 SOLUTION RESPIRATORY (INHALATION) at 17:55

## 2023-01-17 ASSESSMENT — PATIENT HEALTH QUESTIONNAIRE - PHQ9: CLINICAL INTERPRETATION OF PHQ2 SCORE: 0

## 2023-01-18 ENCOUNTER — PHARMACY VISIT (OUTPATIENT)
Dept: PHARMACY | Facility: MEDICAL CENTER | Age: 63
End: 2023-01-18
Payer: COMMERCIAL

## 2023-01-18 PROCEDURE — RXMED WILLOW AMBULATORY MEDICATION CHARGE: Performed by: FAMILY MEDICINE

## 2023-01-18 NOTE — PROGRESS NOTES
Subjective:     CC:   Chief Complaint   Patient presents with    Cough    Immunizations     Pneumonia vaxx         HPI:     Acute cough  Pt reports new onset dry cough   Onset for 2-3 days   She also has an iron taste in her mouth  She feels like she gets pneumonia whenever she has these symptoms  Also new onset of wheezing, albuterol use has also increased, up to 4 uses per day, worse at night. Typically does not use it every day   She is on trelegy regularly  Last steroid use has been a while   Some increased SOB          Past Medical History:   Diagnosis Date    Chronic obstructive pulmonary disease (HCC)     Hypertension     Low back pain     Rash 7/26/2017       Social History     Tobacco Use    Smoking status: Every Day     Packs/day: 0.50     Years: 32.00     Pack years: 16.00     Types: Cigarettes    Smokeless tobacco: Never   Vaping Use    Vaping Use: Never used   Substance Use Topics    Alcohol use: Not Currently     Alcohol/week: 3.6 oz     Types: 6 Cans of beer per week     Comment: history of alcoholism, stopped drinking heavily at 28    Drug use: No       Current Outpatient Medications Ordered in Epic   Medication Sig Dispense Refill    azithromycin (ZITHROMAX) 250 MG Tab Take 2 tabs on day 1, 1 tab daily thereafter until completed. 6 Tablet 0    predniSONE (DELTASONE) 20 MG Tab Take 2 Tablets by mouth every day for 5 days. 10 Tablet 0    albuterol (PROVENTIL) 2.5mg/3ml Nebu Soln solution for nebulization Take 3 mL by nebulization every four hours as needed for Shortness of Breath. 30 Each 5    fluticasone-umeclidin-vilant (TRELEGY ELLIPTA) 100-62.5-25 MCG/ACT AEROSOL POWDER, BREATH ACTIVATED inhalation Inhale 1 puff by mouth every day. 60 Each 5    linaCLOtide (LINZESS) 145 MCG Cap Take 1 capsule by mouth every day. 30 Capsule 3    HYDROcodone/acetaminophen (NORCO)  MG Tab Take 1 Tablet by mouth 2 times a day. 60 Tablet 0    gabapentin (NEURONTIN) 300 MG Cap Take 1 capsule by mouth once daily at  "bedtime. 30 Capsule 5    methocarbamol (ROBAXIN) 500 MG Tab Take 2 Tabs by mouth every bedtime. 60 Tablet 5    triamterene-hydrochlorothiazide (MAXZIDE 75-50) 75-50 MG Tab TAKE 1 TABLET ORALLY EVERY MORNING 30 Tablet 3    HYDROcodone/acetaminophen (NORCO)  MG Tab 1 tablet by mouth twice a day 60 Tablet 0    albuterol 108 (90 Base) MCG/ACT Aero Soln inhalation aerosol Inhale 2 Puffs by mouth every 6 hours as needed for Shortness of Breath. 8.5 g 11    HYDROcodone/acetaminophen (NORCO)  MG Tab Take 1 tablet by mouth 2 times a day. 60 Tablet 0    mometasone (ELOCON) 0.1 % Cream Apply thin layer to rash on hands twice daily as needed until resolved 45 g 2    Prenatal MV-Min-Fe Fum-FA-DHA (PRENATAL 1 PO) Take  by mouth.      meclizine (ANTIVERT) 12.5 MG Tab Take 1-2 Tablets by mouth 3 times a day as needed for Dizziness. (Patient not taking: Reported on 1/17/2023) 30 Tablet 0    polyethylene glycol 3350 (MIRALAX) 17 GM/SCOOP Powder Mix 1 to 2 capfuls in 8 ounces of liquid and take by mouth daily. (Patient not taking: Reported on 1/17/2023) 1020 g 3    bisacodyl EC (DULCOLAX) 5 MG Tablet Delayed Response Take 5 mg by mouth 1 time a day as needed. (Patient not taking: Reported on 1/17/2023)      DOCUSATE SODIUM PO Take  by mouth. (Patient not taking: Reported on 1/17/2023)       No current [facility]-ordered facility-administered medications on file.       Allergies:  Latex        Objective:       Exam:  /82 (BP Location: Left arm, Patient Position: Sitting, BP Cuff Size: Adult)   Pulse 64   Temp 35.9 °C (96.6 °F) (Temporal)   Resp 14   Ht 1.676 m (5' 6\")   Wt 83.5 kg (184 lb)   LMP 05/19/2010   SpO2 95%   BMI 29.70 kg/m²  Body mass index is 29.7 kg/m².    General: Normal appearing. No distress.  Pulmonary: Decreased lung sounds bilaterally, no wheezing, no rales.  After DuoNeb treatment in office, everything was much improved, no wheezing  cardiovascular: Regular rate and rhythm without murmur. "   Psych: Normal mood and affect. Alert and oriented x3. Judgment and insight is normal.      Labs:   None recent    Assessment & Plan:     62 y.o. female with the following -     1. Acute cough  - ipratropium-albuterol (DUONEB) nebulizer solution  - azithromycin (ZITHROMAX) 250 MG Tab; Take 2 tabs on day 1, 1 tab daily thereafter until completed.  Dispense: 6 Tablet; Refill: 0  - predniSONE (DELTASONE) 20 MG Tab; Take 2 Tablets by mouth every day for 5 days.  Dispense: 10 Tablet; Refill: 0  With worsening cough, increased shortness of breath, increased albuterol use, will start patient on steroid burst.  We will try Zithromax.  Patient advised to complete all medications, she is still having symptoms she can send me a wildcraft message or consideration of other outpatient pneumonia medications.  Reordering albuterol for neb.    2. COPD without exacerbation (HCC)  - albuterol (PROVENTIL) 2.5mg/3ml Nebu Soln solution for nebulization; Take 3 mL by nebulization every four hours as needed for Shortness of Breath.  Dispense: 30 Each; Refill: 5    Return if symptoms worsen or fail to improve.    Please note that this dictation was created using voice recognition software. I have made every reasonable attempt to correct obvious errors, but I expect that there are errors of grammar and possibly content that I did not discover before finalizing the note.

## 2023-01-18 NOTE — ASSESSMENT & PLAN NOTE
Pt reports new onset dry cough   Onset for 2-3 days   She also has an iron taste in her mouth  She feels like she gets pneumonia whenever she has these symptoms  Also new onset of wheezing, albuterol use has also increased, up to 4 uses per day, worse at night. Typically does not use it every day   She is on trelegy regularly  Last steroid use has been a while   Some increased SOB

## 2023-01-20 PROCEDURE — RXMED WILLOW AMBULATORY MEDICATION CHARGE: Performed by: INTERNAL MEDICINE

## 2023-01-26 ENCOUNTER — PHARMACY VISIT (OUTPATIENT)
Dept: PHARMACY | Facility: MEDICAL CENTER | Age: 63
End: 2023-01-26
Payer: COMMERCIAL

## 2023-01-26 PROCEDURE — RXMED WILLOW AMBULATORY MEDICATION CHARGE: Performed by: NURSE PRACTITIONER

## 2023-01-26 RX ORDER — CYCLOBENZAPRINE HCL 5 MG
5 TABLET ORAL 3 TIMES DAILY PRN
Qty: 90 TABLET | Refills: 0 | Status: SHIPPED | OUTPATIENT
Start: 2023-01-26 | End: 2023-10-13

## 2023-01-26 RX ORDER — HYDROCODONE BITARTRATE AND ACETAMINOPHEN 10; 325 MG/1; MG/1
TABLET ORAL
Qty: 60 TABLET | Refills: 0 | OUTPATIENT
Start: 2023-02-01 | End: 2023-03-22 | Stop reason: SDUPTHER

## 2023-01-30 NOTE — TELEPHONE ENCOUNTER
Received request via: Pharmacy    Was the patient seen in the last year in this department? Yes    Does the patient have an active prescription (recently filled or refills available) for medication(s) requested? No     Last visit 3/25/22  
Eliquis 5mg BID

## 2023-02-01 PROCEDURE — RXMED WILLOW AMBULATORY MEDICATION CHARGE: Performed by: NURSE PRACTITIONER

## 2023-02-01 PROCEDURE — RXMED WILLOW AMBULATORY MEDICATION CHARGE: Performed by: INTERNAL MEDICINE

## 2023-02-02 ENCOUNTER — PHARMACY VISIT (OUTPATIENT)
Dept: PHARMACY | Facility: MEDICAL CENTER | Age: 63
End: 2023-02-02
Payer: COMMERCIAL

## 2023-02-08 ENCOUNTER — OFFICE VISIT (OUTPATIENT)
Dept: MEDICAL GROUP | Facility: MEDICAL CENTER | Age: 63
End: 2023-02-08
Attending: INTERNAL MEDICINE
Payer: MEDICARE

## 2023-02-08 ENCOUNTER — PHARMACY VISIT (OUTPATIENT)
Dept: PHARMACY | Facility: MEDICAL CENTER | Age: 63
End: 2023-02-08
Payer: COMMERCIAL

## 2023-02-08 VITALS
DIASTOLIC BLOOD PRESSURE: 62 MMHG | BODY MASS INDEX: 31.34 KG/M2 | HEART RATE: 88 BPM | HEIGHT: 65 IN | OXYGEN SATURATION: 95 % | SYSTOLIC BLOOD PRESSURE: 108 MMHG | WEIGHT: 188.1 LBS | TEMPERATURE: 97.2 F | RESPIRATION RATE: 19 BRPM

## 2023-02-08 DIAGNOSIS — J44.1 CHRONIC OBSTRUCTIVE PULMONARY DISEASE WITH ACUTE EXACERBATION (HCC): ICD-10-CM

## 2023-02-08 PROCEDURE — 99212 OFFICE O/P EST SF 10 MIN: CPT | Performed by: INTERNAL MEDICINE

## 2023-02-08 PROCEDURE — 99213 OFFICE O/P EST LOW 20 MIN: CPT | Performed by: INTERNAL MEDICINE

## 2023-02-08 PROCEDURE — RXMED WILLOW AMBULATORY MEDICATION CHARGE: Performed by: INTERNAL MEDICINE

## 2023-02-08 RX ORDER — FLUTICASONE FUROATE, UMECLIDINIUM BROMIDE AND VILANTEROL TRIFENATATE 200; 62.5; 25 UG/1; UG/1; UG/1
1 POWDER RESPIRATORY (INHALATION) DAILY
Qty: 60 EACH | Refills: 5 | Status: SHIPPED | OUTPATIENT
Start: 2023-02-08 | End: 2023-05-02 | Stop reason: SDUPTHER

## 2023-02-08 RX ORDER — ALBUTEROL SULFATE 2.5 MG/3ML
2.5 SOLUTION RESPIRATORY (INHALATION) EVERY 4 HOURS PRN
Qty: 75 ML | Refills: 5 | Status: SHIPPED | OUTPATIENT
Start: 2023-02-08

## 2023-02-08 RX ORDER — PREDNISONE 10 MG/1
TABLET ORAL
Qty: 42 TABLET | Refills: 0 | Status: SHIPPED | OUTPATIENT
Start: 2023-02-08 | End: 2023-02-28

## 2023-02-08 NOTE — ASSESSMENT & PLAN NOTE
She reports that beginning in mid January she started to have increasing shortness of breath both at rest and with exertion.  She reports coughing and wheezing throughout the day and at night.  At that time, she was treated with a prednisone burst and azithromycin as well as an albuterol nebulizer.  She states that with the nebulizer and prednisone, she felt a lot better but quickly felt back to being short of breath upon completion of therapy.  She was never able to  the nebulizer medication from the pharmacy.  She has been using her Trelegy regularly but also reports needing her rescue inhaler at least 2-3 times daily.  She just used it about an hour before coming to her visit.  She denies any symptoms of allergies, fevers, chills.  Cough is dry.  She continues to smoke 5 cigarettes/day.  She is not interested in quitting.  She denies worsening heartburn or leg swelling.

## 2023-02-08 NOTE — PROGRESS NOTES
Subjective:   Anali Tellez is a 62 y.o. female here today for f/u breathing    Chronic obstructive pulmonary disease with acute exacerbation (HCC)  She reports that beginning in mid January she started to have increasing shortness of breath both at rest and with exertion.  She reports coughing and wheezing throughout the day and at night.  At that time, she was treated with a prednisone burst and azithromycin as well as an albuterol nebulizer.  She states that with the nebulizer and prednisone, she felt a lot better but quickly felt back to being short of breath upon completion of therapy.  She was never able to  the nebulizer medication from the pharmacy.  She has been using her Trelegy regularly but also reports needing her rescue inhaler at least 2-3 times daily.  She just used it about an hour before coming to her visit.  She denies any symptoms of allergies, fevers, chills.  Cough is dry.  She continues to smoke 5 cigarettes/day.  She is not interested in quitting.  She denies worsening heartburn or leg swelling.    Requesting a V handicap placard due to her shortness of breath    Current medicines (including changes today)  Current Outpatient Medications   Medication Sig Dispense Refill    albuterol (PROVENTIL) 2.5mg/3ml Nebu Soln solution for nebulization Take 3 mL by nebulization every four hours as needed for Shortness of Breath. 75 mL 5    Fluticasone-Umeclidin-Vilant (TRELEGY ELLIPTA) 200-62.5-25 MCG/ACT AEROSOL POWDER, BREATH ACTIVATED Inhale 1 Inhalation every day. 28 Each 5    predniSONE (DELTASONE) 10 MG Tab Take 4 tabs daily x 4 days then 3 tabs x 4 days then 2 tabs x 4 days then 1 tab x 4 days then 1/2 tab x 4 days then stop 42 Tablet 0    cyclobenzaprine (FLEXERIL) 5 mg tablet Take 1 Tablet by mouth 3 times a day as needed. 90 Tablet 0    HYDROcodone/acetaminophen (NORCO)  MG Tab Take 1 tablet by mouth twive daily 60 Tablet 0    linaCLOtide (LINZESS) 145 MCG Cap Take 1  capsule by mouth every day. 30 Capsule 3    HYDROcodone/acetaminophen (NORCO)  MG Tab Take 1 Tablet by mouth 2 times a day. 60 Tablet 0    gabapentin (NEURONTIN) 300 MG Cap Take 1 capsule by mouth once daily at bedtime. 30 Capsule 5    methocarbamol (ROBAXIN) 500 MG Tab Take 2 Tabs by mouth every bedtime. 60 Tablet 5    triamterene-hydrochlorothiazide (MAXZIDE 75-50) 75-50 MG Tab TAKE 1 TABLET ORALLY EVERY MORNING 30 Tablet 3    HYDROcodone/acetaminophen (NORCO)  MG Tab 1 tablet by mouth twice a day 60 Tablet 0    albuterol 108 (90 Base) MCG/ACT Aero Soln inhalation aerosol Inhale 2 Puffs by mouth every 6 hours as needed for Shortness of Breath. 8.5 g 11    HYDROcodone/acetaminophen (NORCO)  MG Tab Take 1 tablet by mouth 2 times a day. 60 Tablet 0    mometasone (ELOCON) 0.1 % Cream Apply thin layer to rash on hands twice daily as needed until resolved 45 g 2    Prenatal MV-Min-Fe Fum-FA-DHA (PRENATAL 1 PO) Take  by mouth.       No current facility-administered medications for this visit.     She  has a past medical history of Chronic obstructive pulmonary disease (HCC), Hypertension, Low back pain, and Rash (7/26/2017).         Objective:     Vitals:    02/08/23 1328   BP: 108/62   Pulse: 88   Resp: 19   Temp: 36.2 °C (97.2 °F)   SpO2: 95%     Body mass index is 31.01 kg/m².   Physical Exam:  Constitutional: Alert, no distress.  Skin: Warm, dry, good turgor, no rashes in visible areas.  Eye: Equal, round and reactive, conjunctiva clear, lids normal.  Respiratory: Unlabored respiratory effort, lungs clear to auscultation, no wheezes, no ronchi however recently used albuterol HFA.  Cardiovascular: Regular rate and rhythm, no murmurs      Assessment and Plan:   The following treatment plan was discussed    1. Chronic obstructive pulmonary disease with acute exacerbation (HCC)  Remains symptomatic despite recent treatment for COPD exacerbation.  No signs or symptoms of acute infection.  We discussed  trial of an extended prednisone taper in addition to uptitrating her Trelegy.  If this is not effective, would consider referral to pulmonology.  Discussed importance of smoking cessation however she is not willing to quit.  - albuterol (PROVENTIL) 2.5mg/3ml Nebu Soln solution for nebulization; Take 3 mL by nebulization every four hours as needed for Shortness of Breath.  Dispense: 30 Each; Refill: 5  - Fluticasone-Umeclidin-Vilant (TRELEGY ELLIPTA) 200-62.5-25 MCG/ACT AEROSOL POWDER, BREATH ACTIVATED; Inhale 1 Inhalation every day.  Dispense: 28 Each; Refill: 5  - predniSONE (DELTASONE) 10 MG Tab; Take 4 tabs daily x 4 days then 3 tabs x 4 days then 2 tabs x 4 days then 1 tab x 4 days then 1/2 tab x 4 days then stop  Dispense: 42 Tablet; Refill: 0  -DMV paperwork completed for handicap placard        Followup: Return if symptoms worsen or fail to improve.

## 2023-03-02 DIAGNOSIS — I10 ESSENTIAL HYPERTENSION: ICD-10-CM

## 2023-03-02 PROCEDURE — RXMED WILLOW AMBULATORY MEDICATION CHARGE: Performed by: INTERNAL MEDICINE

## 2023-03-03 PROCEDURE — RXMED WILLOW AMBULATORY MEDICATION CHARGE: Performed by: INTERNAL MEDICINE

## 2023-03-03 RX ORDER — TRIAMTERENE AND HYDROCHLOROTHIAZIDE 75; 50 MG/1; MG/1
TABLET ORAL
Qty: 30 TABLET | Refills: 5 | Status: SHIPPED | OUTPATIENT
Start: 2023-03-03 | End: 2023-09-05 | Stop reason: SDUPTHER

## 2023-03-03 NOTE — TELEPHONE ENCOUNTER
Received request via: Pharmacy    Was the patient seen in the last year in this department? Yes    Does the patient have an active prescription (recently filled or refills available) for medication(s) requested? No    Does the patient have care home Plus and need 100 day supply (blood pressure, diabetes and cholesterol meds only)? Patient does not have SCP   No appt scheduled at this time

## 2023-03-06 ENCOUNTER — PHARMACY VISIT (OUTPATIENT)
Dept: PHARMACY | Facility: MEDICAL CENTER | Age: 63
End: 2023-03-06
Payer: COMMERCIAL

## 2023-03-08 ENCOUNTER — PHARMACY VISIT (OUTPATIENT)
Dept: PHARMACY | Facility: MEDICAL CENTER | Age: 63
End: 2023-03-08
Payer: COMMERCIAL

## 2023-03-22 PROCEDURE — RXMED WILLOW AMBULATORY MEDICATION CHARGE: Performed by: NURSE PRACTITIONER

## 2023-03-30 ENCOUNTER — PHARMACY VISIT (OUTPATIENT)
Dept: PHARMACY | Facility: MEDICAL CENTER | Age: 63
End: 2023-03-30
Payer: COMMERCIAL

## 2023-03-30 PROCEDURE — RXMED WILLOW AMBULATORY MEDICATION CHARGE: Performed by: INTERNAL MEDICINE

## 2023-04-03 ENCOUNTER — PHARMACY VISIT (OUTPATIENT)
Dept: PHARMACY | Facility: MEDICAL CENTER | Age: 63
End: 2023-04-03
Payer: COMMERCIAL

## 2023-05-02 ENCOUNTER — PHARMACY VISIT (OUTPATIENT)
Dept: PHARMACY | Facility: MEDICAL CENTER | Age: 63
End: 2023-05-02
Payer: COMMERCIAL

## 2023-05-02 DIAGNOSIS — J44.1 CHRONIC OBSTRUCTIVE PULMONARY DISEASE WITH ACUTE EXACERBATION (HCC): ICD-10-CM

## 2023-05-02 PROCEDURE — RXMED WILLOW AMBULATORY MEDICATION CHARGE: Performed by: INTERNAL MEDICINE

## 2023-05-02 PROCEDURE — RXMED WILLOW AMBULATORY MEDICATION CHARGE: Performed by: NURSE PRACTITIONER

## 2023-05-02 RX ORDER — FLUTICASONE FUROATE, UMECLIDINIUM BROMIDE AND VILANTEROL TRIFENATATE 200; 62.5; 25 UG/1; UG/1; UG/1
1 POWDER RESPIRATORY (INHALATION) DAILY
Qty: 60 EACH | Refills: 5 | Status: SHIPPED | OUTPATIENT
Start: 2023-05-02 | End: 2023-10-26 | Stop reason: SDUPTHER

## 2023-05-02 NOTE — TELEPHONE ENCOUNTER
Received request via: Pharmacy    Was the patient seen in the last year in this department? Yes    Does the patient have an active prescription (recently filled or refills available) for medication(s) requested? No    Does the patient have [facility] Plus and need 100 day supply (blood pressure, diabetes and cholesterol meds only)? Patient does not have SCP   No appt scheduled at this time

## 2023-05-08 PROCEDURE — RXMED WILLOW AMBULATORY MEDICATION CHARGE: Performed by: INTERNAL MEDICINE

## 2023-05-10 ENCOUNTER — PHARMACY VISIT (OUTPATIENT)
Dept: PHARMACY | Facility: MEDICAL CENTER | Age: 63
End: 2023-05-10
Payer: COMMERCIAL

## 2023-05-17 ENCOUNTER — PHARMACY VISIT (OUTPATIENT)
Dept: PHARMACY | Facility: MEDICAL CENTER | Age: 63
End: 2023-05-17
Payer: COMMERCIAL

## 2023-05-17 PROCEDURE — RXMED WILLOW AMBULATORY MEDICATION CHARGE: Performed by: NURSE PRACTITIONER

## 2023-05-17 RX ORDER — HYDROCODONE BITARTRATE AND ACETAMINOPHEN 10; 325 MG/1; MG/1
TABLET ORAL
Qty: 60 TABLET | Refills: 0 | OUTPATIENT
Start: 2023-05-17 | End: 2023-06-14 | Stop reason: SDUPTHER

## 2023-06-02 DIAGNOSIS — G89.29 CHRONIC BILATERAL LOW BACK PAIN WITH LEFT-SIDED SCIATICA: ICD-10-CM

## 2023-06-02 DIAGNOSIS — K59.03 DRUG-INDUCED CONSTIPATION: ICD-10-CM

## 2023-06-02 DIAGNOSIS — M54.42 CHRONIC BILATERAL LOW BACK PAIN WITH LEFT-SIDED SCIATICA: ICD-10-CM

## 2023-06-02 PROCEDURE — RXMED WILLOW AMBULATORY MEDICATION CHARGE: Performed by: INTERNAL MEDICINE

## 2023-06-02 RX ORDER — METHOCARBAMOL 500 MG/1
1000 TABLET, FILM COATED ORAL
Qty: 60 TABLET | Refills: 5 | Status: SHIPPED | OUTPATIENT
Start: 2023-06-02 | End: 2023-06-26 | Stop reason: SDUPTHER

## 2023-06-02 RX ORDER — GABAPENTIN 300 MG/1
300 CAPSULE ORAL
Qty: 30 CAPSULE | Refills: 5 | Status: SHIPPED | OUTPATIENT
Start: 2023-06-02 | End: 2023-08-03 | Stop reason: SDUPTHER

## 2023-06-02 RX ORDER — LINACLOTIDE 145 UG/1
1 CAPSULE, GELATIN COATED ORAL DAILY
Qty: 30 CAPSULE | Refills: 5 | Status: SHIPPED | OUTPATIENT
Start: 2023-06-02 | End: 2024-01-22 | Stop reason: SDUPTHER

## 2023-06-05 ENCOUNTER — PHARMACY VISIT (OUTPATIENT)
Dept: PHARMACY | Facility: MEDICAL CENTER | Age: 63
End: 2023-06-05
Payer: COMMERCIAL

## 2023-06-14 ENCOUNTER — PHARMACY VISIT (OUTPATIENT)
Dept: PHARMACY | Facility: MEDICAL CENTER | Age: 63
End: 2023-06-14
Payer: COMMERCIAL

## 2023-06-14 PROCEDURE — RXMED WILLOW AMBULATORY MEDICATION CHARGE: Performed by: NURSE PRACTITIONER

## 2023-06-14 RX ORDER — BACLOFEN 10 MG/1
TABLET ORAL
Qty: 90 TABLET | Refills: 0 | Status: SHIPPED | OUTPATIENT
Start: 2023-06-14 | End: 2023-06-26

## 2023-06-14 RX ORDER — HYDROCODONE BITARTRATE AND ACETAMINOPHEN 10; 325 MG/1; MG/1
TABLET ORAL
Qty: 60 TABLET | Refills: 0 | OUTPATIENT
Start: 2023-06-14 | End: 2023-07-12 | Stop reason: SDUPTHER

## 2023-06-26 ENCOUNTER — OFFICE VISIT (OUTPATIENT)
Dept: MEDICAL GROUP | Facility: MEDICAL CENTER | Age: 63
End: 2023-06-26
Attending: NURSE PRACTITIONER
Payer: MEDICARE

## 2023-06-26 ENCOUNTER — PHARMACY VISIT (OUTPATIENT)
Dept: PHARMACY | Facility: MEDICAL CENTER | Age: 63
End: 2023-06-26
Payer: COMMERCIAL

## 2023-06-26 VITALS
HEIGHT: 65 IN | BODY MASS INDEX: 28.78 KG/M2 | SYSTOLIC BLOOD PRESSURE: 112 MMHG | OXYGEN SATURATION: 97 % | RESPIRATION RATE: 17 BRPM | WEIGHT: 172.7 LBS | TEMPERATURE: 95.6 F | HEART RATE: 74 BPM | DIASTOLIC BLOOD PRESSURE: 60 MMHG

## 2023-06-26 DIAGNOSIS — E55.9 VITAMIN D DEFICIENCY: ICD-10-CM

## 2023-06-26 DIAGNOSIS — R73.03 PRE-DIABETES: ICD-10-CM

## 2023-06-26 DIAGNOSIS — G89.29 CHRONIC BILATERAL LOW BACK PAIN WITH LEFT-SIDED SCIATICA: ICD-10-CM

## 2023-06-26 DIAGNOSIS — M54.42 CHRONIC BILATERAL LOW BACK PAIN WITH LEFT-SIDED SCIATICA: ICD-10-CM

## 2023-06-26 DIAGNOSIS — Z12.31 ENCOUNTER FOR SCREENING MAMMOGRAM FOR MALIGNANT NEOPLASM OF BREAST: ICD-10-CM

## 2023-06-26 DIAGNOSIS — Z13.220 SCREENING, LIPID: ICD-10-CM

## 2023-06-26 DIAGNOSIS — I10 PRIMARY HYPERTENSION: ICD-10-CM

## 2023-06-26 DIAGNOSIS — T50.905A ADVERSE EFFECT OF DRUG, INITIAL ENCOUNTER: ICD-10-CM

## 2023-06-26 PROCEDURE — 99213 OFFICE O/P EST LOW 20 MIN: CPT | Performed by: NURSE PRACTITIONER

## 2023-06-26 PROCEDURE — RXMED WILLOW AMBULATORY MEDICATION CHARGE: Performed by: NURSE PRACTITIONER

## 2023-06-26 PROCEDURE — 99214 OFFICE O/P EST MOD 30 MIN: CPT | Performed by: NURSE PRACTITIONER

## 2023-06-26 PROCEDURE — 3078F DIAST BP <80 MM HG: CPT | Performed by: NURSE PRACTITIONER

## 2023-06-26 PROCEDURE — 3074F SYST BP LT 130 MM HG: CPT | Performed by: NURSE PRACTITIONER

## 2023-06-26 RX ORDER — METHOCARBAMOL 500 MG/1
500 TABLET, FILM COATED ORAL 2 TIMES DAILY
Qty: 90 TABLET | Refills: 1 | Status: SHIPPED | OUTPATIENT
Start: 2023-06-26 | End: 2023-08-21 | Stop reason: SDUPTHER

## 2023-06-26 NOTE — ASSESSMENT & PLAN NOTE
Acute-  Encourage patient to discontinue taking the baclofen and that we can add that to her allergy list.  She is still having significant muscle spasms especially in her lower extremities at this time.  She does tolerate her methocarbamol fairly well so we will increase this temporarily until the baclofen is completely out of her system.  She was previously only taking her methocarbamol 1000 mg at night to help with muscle spasms that she sleeps.  We will have her start with one 500 mg dose in the morning and continue with her 1000 mg at night and see if this helps with her pain.   She has a follow-up appointment with her pain management on July 12 and she will discuss further with them at that time.

## 2023-06-26 NOTE — PROGRESS NOTES
No chief complaint on file.      Subjective:     HPI:   Anali Tellez is a 62 y.o. female here to discuss the evaluation and management of:      Problem   Medication Reaction    Patient has been changed recently by her pain provider to baclofen approximately 14 days ago.  Patient states that she started the medication and for the first 5 to 6 days seem to be working really well.  Patient was instructed at that time to stop her cyclobenzaprine and only continue with her baclofen and Robaxin/methocarbamol.  Patient states around day 7 she started to notice increased stiffness, pain, and swelling in her lower extremities as well as slight rash on her lower extremities.  Patient states that the pain got so bad that she was not able to bend over and tie her shoes.  This was the only medication change that was recent.  Patient stopped taking medication about 6 days ago.  She has noted some improvement since she stopped taking the baclofen.          ROS  See HPI     Allergies   Allergen Reactions    Latex Hives and Rash     Pt states hives and rash       Current medicines (including changes today)  Current Outpatient Medications   Medication Sig Dispense Refill    methocarbamol (ROBAXIN) 500 MG Tab Take 1 Tablet by mouth 2 times a day. Take one pill in morning and 2 pills at bedtime 90 Tablet 1    HYDROcodone/acetaminophen (NORCO)  MG Tab Take 1 tablet by mouth twice a day as needed 60 Tablet 0    gabapentin (NEURONTIN) 300 MG Cap Take 1 capsule by mouth once daily at bedtime. 30 Capsule 5    linaCLOtide (LINZESS) 145 MCG Cap Take 1 capsule by mouth every day. 30 Capsule 5    Fluticasone-Umeclidin-Vilant (TRELEGY ELLIPTA) 200-62.5-25 MCG/ACT AEROSOL POWDER, BREATH ACTIVATED Inhale 1 Inhalation every day. 60 Each 5    triamterene-hydrochlorothiazide (MAXZIDE 75-50) 75-50 MG Tab TAKE 1 TABLET ORALLY EVERY MORNING 30 Tablet 5    albuterol (PROVENTIL) 2.5mg/3ml Nebu Soln solution for nebulization Take 3 mL by  nebulization every four hours as needed for Shortness of Breath. 75 mL 5    cyclobenzaprine (FLEXERIL) 5 mg tablet Take 1 Tablet by mouth 3 times a day as needed. 90 Tablet 0    HYDROcodone/acetaminophen (NORCO)  MG Tab Take 1 Tablet by mouth 2 times a day. 60 Tablet 0    HYDROcodone/acetaminophen (NORCO)  MG Tab 1 tablet by mouth twice a day 60 Tablet 0    albuterol 108 (90 Base) MCG/ACT Aero Soln inhalation aerosol Inhale 2 Puffs by mouth every 6 hours as needed for Shortness of Breath. 8.5 g 11    HYDROcodone/acetaminophen (NORCO)  MG Tab Take 1 tablet by mouth 2 times a day. 60 Tablet 0    mometasone (ELOCON) 0.1 % Cream Apply thin layer to rash on hands twice daily as needed until resolved 45 g 2    Prenatal MV-Min-Fe Fum-FA-DHA (PRENATAL 1 PO) Take  by mouth.       No current facility-administered medications for this visit.       Social History     Tobacco Use    Smoking status: Every Day     Packs/day: 0.50     Years: 32.00     Pack years: 16.00     Types: Cigarettes    Smokeless tobacco: Never   Vaping Use    Vaping Use: Never used   Substance Use Topics    Alcohol use: Not Currently     Alcohol/week: 3.6 oz     Types: 6 Cans of beer per week     Comment: history of alcoholism, stopped drinking heavily at 28    Drug use: No       Patient Active Problem List    Diagnosis Date Noted    Medication reaction 06/26/2023    Acute cough 01/17/2023    Vertigo 07/21/2022    Seborrheic keratoses 04/21/2021    Drug-induced constipation 12/23/2020    Vitamin D deficiency 06/11/2019    Eczema 03/09/2018    Obesity (BMI 30-39.9) 02/08/2018    Lumbar facet arthropathy 08/11/2017    Pre-diabetes 04/05/2017    Alcoholism in remission (HCC) 03/23/2017    Tobacco use 03/23/2017    Chronic bilateral low back pain with left-sided sciatica 03/23/2017    Chronic obstructive pulmonary disease with acute exacerbation (HCC) 11/06/2016    HTN (hypertension) 11/06/2016       Family History   Problem Relation Age of  "Onset    Diabetes Mother     Hypertension Mother     Heart Disease Mother         78    Heart Disease Father         82    Diabetes Sister     Hypertension Sister     No Known Problems Brother     Hypertension Sister     Cancer Neg Hx           Objective:     /60 (BP Location: Left arm, Patient Position: Sitting, BP Cuff Size: Adult long)   Pulse 74   Temp (!) 35.3 °C (95.6 °F) (Temporal)   Resp 17   Ht 1.659 m (5' 5.3\")   Wt 78.3 kg (172 lb 11.2 oz)   SpO2 97%  Body mass index is 28.48 kg/m².    Physical Exam:  Physical Exam  Vitals reviewed.   Constitutional:       General: She is awake.      Appearance: Normal appearance. She is well-developed.   HENT:      Head: Normocephalic.   Eyes:      Conjunctiva/sclera: Conjunctivae normal.   Cardiovascular:      Rate and Rhythm: Normal rate.   Pulmonary:      Effort: Pulmonary effort is normal. No respiratory distress.   Musculoskeletal:      Cervical back: Neck supple.      Right lower leg: Edema (slight) present.      Left lower leg: Edema (slight) present.   Skin:     General: Skin is warm and dry.   Neurological:      Mental Status: She is alert and oriented to person, place, and time.   Psychiatric:         Mood and Affect: Mood normal.         Behavior: Behavior normal. Behavior is cooperative.              Assessment and Plan:     The following treatment plan was discussed:    Problem List Items Addressed This Visit       HTN (hypertension)    Relevant Orders    Comp Metabolic Panel    Chronic bilateral low back pain with left-sided sciatica    Relevant Medications    methocarbamol (ROBAXIN) 500 MG Tab    Pre-diabetes    Relevant Orders    HEMOGLOBIN A1C    Comp Metabolic Panel    Vitamin D deficiency    Relevant Orders    VITAMIN D,25 HYDROXY (DEFICIENCY)    Medication reaction     Acute-  Encourage patient to discontinue taking the baclofen and that we can add that to her allergy list.  She is still having significant muscle spasms especially in her " lower extremities at this time.  She does tolerate her methocarbamol fairly well so we will increase this temporarily until the baclofen is completely out of her system.  She was previously only taking her methocarbamol 1000 mg at night to help with muscle spasms that she sleeps.  We will have her start with one 500 mg dose in the morning and continue with her 1000 mg at night and see if this helps with her pain.   She has a follow-up appointment with her pain management on July 12 and she will discuss further with them at that time.           Other Visit Diagnoses       Screening, lipid        Relevant Orders    Lipid Profile            Any change or worsening of signs or symptoms, patient encouraged to follow-up or report to emergency room for further evaluation. Patient verbalizes understanding and agrees.    Follow-Up: Follow-up as needed or if no improvement.      PLEASE NOTE: This dictation was created using voice recognition software. I have made every reasonable attempt to correct obvious errors, but I expect that there are errors of grammar and possibly content that I did not discover before finalizing the note.

## 2023-07-05 PROCEDURE — RXMED WILLOW AMBULATORY MEDICATION CHARGE: Performed by: INTERNAL MEDICINE

## 2023-07-06 ENCOUNTER — PHARMACY VISIT (OUTPATIENT)
Dept: PHARMACY | Facility: MEDICAL CENTER | Age: 63
End: 2023-07-06
Payer: COMMERCIAL

## 2023-07-12 ENCOUNTER — HOSPITAL ENCOUNTER (OUTPATIENT)
Dept: LAB | Facility: MEDICAL CENTER | Age: 63
End: 2023-07-12
Attending: NURSE PRACTITIONER
Payer: MEDICARE

## 2023-07-12 DIAGNOSIS — E55.9 VITAMIN D DEFICIENCY: ICD-10-CM

## 2023-07-12 DIAGNOSIS — Z13.220 SCREENING, LIPID: ICD-10-CM

## 2023-07-12 DIAGNOSIS — I10 PRIMARY HYPERTENSION: ICD-10-CM

## 2023-07-12 DIAGNOSIS — R73.03 PRE-DIABETES: ICD-10-CM

## 2023-07-12 LAB
25(OH)D3 SERPL-MCNC: 43 NG/ML (ref 30–100)
ALBUMIN SERPL BCP-MCNC: 4.2 G/DL (ref 3.2–4.9)
ALBUMIN/GLOB SERPL: 1.4 G/DL
ALP SERPL-CCNC: 77 U/L (ref 30–99)
ALT SERPL-CCNC: 22 U/L (ref 2–50)
ANION GAP SERPL CALC-SCNC: 10 MMOL/L (ref 7–16)
AST SERPL-CCNC: 23 U/L (ref 12–45)
BILIRUB SERPL-MCNC: 0.5 MG/DL (ref 0.1–1.5)
BUN SERPL-MCNC: 12 MG/DL (ref 8–22)
CALCIUM ALBUM COR SERPL-MCNC: 9.7 MG/DL (ref 8.5–10.5)
CALCIUM SERPL-MCNC: 9.9 MG/DL (ref 8.5–10.5)
CHLORIDE SERPL-SCNC: 103 MMOL/L (ref 96–112)
CHOLEST SERPL-MCNC: 140 MG/DL (ref 100–199)
CO2 SERPL-SCNC: 28 MMOL/L (ref 20–33)
CREAT SERPL-MCNC: 1.05 MG/DL (ref 0.5–1.4)
EST. AVERAGE GLUCOSE BLD GHB EST-MCNC: 114 MG/DL
FASTING STATUS PATIENT QL REPORTED: NORMAL
GFR SERPLBLD CREATININE-BSD FMLA CKD-EPI: 59 ML/MIN/1.73 M 2
GLOBULIN SER CALC-MCNC: 3 G/DL (ref 1.9–3.5)
GLUCOSE SERPL-MCNC: 92 MG/DL (ref 65–99)
HBA1C MFR BLD: 5.6 % (ref 4–5.6)
HDLC SERPL-MCNC: 62 MG/DL
LDLC SERPL CALC-MCNC: 64 MG/DL
POTASSIUM SERPL-SCNC: 3.9 MMOL/L (ref 3.6–5.5)
PROT SERPL-MCNC: 7.2 G/DL (ref 6–8.2)
SODIUM SERPL-SCNC: 141 MMOL/L (ref 135–145)
TRIGL SERPL-MCNC: 71 MG/DL (ref 0–149)

## 2023-07-12 PROCEDURE — RXMED WILLOW AMBULATORY MEDICATION CHARGE: Performed by: NURSE PRACTITIONER

## 2023-07-12 PROCEDURE — 82306 VITAMIN D 25 HYDROXY: CPT

## 2023-07-12 PROCEDURE — 83036 HEMOGLOBIN GLYCOSYLATED A1C: CPT | Mod: GA

## 2023-07-12 PROCEDURE — 36415 COLL VENOUS BLD VENIPUNCTURE: CPT

## 2023-07-12 PROCEDURE — 80061 LIPID PANEL: CPT

## 2023-07-12 PROCEDURE — 80053 COMPREHEN METABOLIC PANEL: CPT

## 2023-07-19 ENCOUNTER — PHARMACY VISIT (OUTPATIENT)
Dept: PHARMACY | Facility: MEDICAL CENTER | Age: 63
End: 2023-07-19
Payer: COMMERCIAL

## 2023-07-25 PROCEDURE — RXMED WILLOW AMBULATORY MEDICATION CHARGE: Performed by: NURSE PRACTITIONER

## 2023-07-26 ENCOUNTER — PHARMACY VISIT (OUTPATIENT)
Dept: PHARMACY | Facility: MEDICAL CENTER | Age: 63
End: 2023-07-26
Payer: COMMERCIAL

## 2023-08-02 PROCEDURE — RXMED WILLOW AMBULATORY MEDICATION CHARGE: Performed by: INTERNAL MEDICINE

## 2023-08-03 ENCOUNTER — PHARMACY VISIT (OUTPATIENT)
Dept: PHARMACY | Facility: MEDICAL CENTER | Age: 63
End: 2023-08-03
Payer: COMMERCIAL

## 2023-08-03 DIAGNOSIS — M54.42 CHRONIC BILATERAL LOW BACK PAIN WITH LEFT-SIDED SCIATICA: ICD-10-CM

## 2023-08-03 DIAGNOSIS — G89.29 CHRONIC BILATERAL LOW BACK PAIN WITH LEFT-SIDED SCIATICA: ICD-10-CM

## 2023-08-03 RX ORDER — GABAPENTIN 300 MG/1
300 CAPSULE ORAL
Qty: 30 CAPSULE | Refills: 5 | Status: CANCELLED | OUTPATIENT
Start: 2023-08-03

## 2023-08-04 ENCOUNTER — PHARMACY VISIT (OUTPATIENT)
Dept: PHARMACY | Facility: MEDICAL CENTER | Age: 63
End: 2023-08-04
Payer: COMMERCIAL

## 2023-08-04 DIAGNOSIS — M54.42 CHRONIC BILATERAL LOW BACK PAIN WITH LEFT-SIDED SCIATICA: ICD-10-CM

## 2023-08-04 DIAGNOSIS — G89.29 CHRONIC BILATERAL LOW BACK PAIN WITH LEFT-SIDED SCIATICA: ICD-10-CM

## 2023-08-04 PROCEDURE — RXMED WILLOW AMBULATORY MEDICATION CHARGE: Performed by: FAMILY MEDICINE

## 2023-08-04 RX ORDER — GABAPENTIN 600 MG/1
300 TABLET ORAL NIGHTLY
Qty: 60 TABLET | Refills: 0 | Status: SHIPPED | OUTPATIENT
Start: 2023-08-04

## 2023-08-04 RX ORDER — GABAPENTIN 300 MG/1
CAPSULE ORAL
Qty: 30 CAPSULE | Refills: 2 | Status: SHIPPED | OUTPATIENT
Start: 2023-08-04 | End: 2023-08-04

## 2023-08-04 NOTE — TELEPHONE ENCOUNTER
Received request via: Pharmacy    Was the patient seen in the last year in this department? Yes    Does the patient have an active prescription (recently filled or refills available) for medication(s) requested? No    Does the patient have [facility] Plus and need 100 day supply (blood pressure, diabetes and cholesterol meds only)? Patient does not have SCP   No appt scheduled at this time

## 2023-08-15 ENCOUNTER — NON-PROVIDER VISIT (OUTPATIENT)
Dept: MEDICAL GROUP | Facility: MEDICAL CENTER | Age: 63
End: 2023-08-15
Attending: FAMILY MEDICINE
Payer: MEDICARE

## 2023-08-15 DIAGNOSIS — Z23 NEED FOR VACCINATION: ICD-10-CM

## 2023-08-15 PROCEDURE — 99999 PR NO CHARGE: CPT | Performed by: FAMILY MEDICINE

## 2023-08-15 PROCEDURE — 90471 IMMUNIZATION ADMIN: CPT

## 2023-08-15 NOTE — PROGRESS NOTES
"Anali Tellez is a 63 y.o. female here for a non-provider visit for:   PREVNAR 20 (PCV20)  TDAP    Reason for immunization: Overdue/Provider Recommended  Immunization records indicate need for vaccine: Yes, confirmed with Epic  Minimum interval has been met for this vaccine: Yes  ABN completed: Not Indicated    VIS Dated 05/12/2023 ,08/06/2021 was given to patient: Yes  All IAC Questionnaire questions were answered \"No.\"    Patient tolerated injection and no adverse effects were observed or reported: Yes    Pt scheduled for next dose in series: Not Indicated    "

## 2023-08-21 DIAGNOSIS — G89.29 CHRONIC BILATERAL LOW BACK PAIN WITH LEFT-SIDED SCIATICA: ICD-10-CM

## 2023-08-21 DIAGNOSIS — M54.42 CHRONIC BILATERAL LOW BACK PAIN WITH LEFT-SIDED SCIATICA: ICD-10-CM

## 2023-08-22 PROCEDURE — RXMED WILLOW AMBULATORY MEDICATION CHARGE: Performed by: FAMILY MEDICINE

## 2023-08-22 RX ORDER — METHOCARBAMOL 500 MG/1
500 TABLET, FILM COATED ORAL 2 TIMES DAILY
Qty: 90 TABLET | Refills: 1 | Status: SHIPPED | OUTPATIENT
Start: 2023-08-22

## 2023-08-23 ENCOUNTER — PHARMACY VISIT (OUTPATIENT)
Dept: PHARMACY | Facility: MEDICAL CENTER | Age: 63
End: 2023-08-23
Payer: COMMERCIAL

## 2023-09-05 ENCOUNTER — PHARMACY VISIT (OUTPATIENT)
Dept: PHARMACY | Facility: MEDICAL CENTER | Age: 63
End: 2023-09-05
Payer: COMMERCIAL

## 2023-09-05 DIAGNOSIS — I10 ESSENTIAL HYPERTENSION: ICD-10-CM

## 2023-09-05 PROCEDURE — RXMED WILLOW AMBULATORY MEDICATION CHARGE: Performed by: INTERNAL MEDICINE

## 2023-09-05 PROCEDURE — RXMED WILLOW AMBULATORY MEDICATION CHARGE: Performed by: FAMILY MEDICINE

## 2023-09-05 RX ORDER — TRIAMTERENE AND HYDROCHLOROTHIAZIDE 75; 50 MG/1; MG/1
TABLET ORAL
Qty: 30 TABLET | Refills: 0 | Status: SHIPPED | OUTPATIENT
Start: 2023-09-05 | End: 2023-10-04 | Stop reason: SDUPTHER

## 2023-09-05 NOTE — TELEPHONE ENCOUNTER
Received request via: Pharmacy    Was the patient seen in the last year in this department? Yes    Does the patient have an active prescription (recently filled or refills available) for medication(s) requested? No    Does the patient have [facility] Plus and need 100 day supply (blood pressure, diabetes and cholesterol meds only)? Patient does not have SCP

## 2023-10-04 DIAGNOSIS — I10 ESSENTIAL HYPERTENSION: ICD-10-CM

## 2023-10-04 PROCEDURE — RXMED WILLOW AMBULATORY MEDICATION CHARGE: Performed by: INTERNAL MEDICINE

## 2023-10-04 RX ORDER — TRIAMTERENE AND HYDROCHLOROTHIAZIDE 75; 50 MG/1; MG/1
TABLET ORAL
Qty: 30 TABLET | Refills: 3 | Status: SHIPPED | OUTPATIENT
Start: 2023-10-04 | End: 2024-02-01 | Stop reason: SDUPTHER

## 2023-10-05 PROCEDURE — RXMED WILLOW AMBULATORY MEDICATION CHARGE: Performed by: INTERNAL MEDICINE

## 2023-10-06 ENCOUNTER — PHARMACY VISIT (OUTPATIENT)
Dept: PHARMACY | Facility: MEDICAL CENTER | Age: 63
End: 2023-10-06
Payer: COMMERCIAL

## 2023-10-12 ENCOUNTER — PHARMACY VISIT (OUTPATIENT)
Dept: PHARMACY | Facility: MEDICAL CENTER | Age: 63
End: 2023-10-12
Payer: COMMERCIAL

## 2023-10-13 ENCOUNTER — OFFICE VISIT (OUTPATIENT)
Dept: MEDICAL GROUP | Facility: MEDICAL CENTER | Age: 63
End: 2023-10-13
Attending: NURSE PRACTITIONER
Payer: MEDICARE

## 2023-10-13 VITALS
HEART RATE: 66 BPM | HEIGHT: 65 IN | BODY MASS INDEX: 29.09 KG/M2 | RESPIRATION RATE: 17 BRPM | SYSTOLIC BLOOD PRESSURE: 122 MMHG | OXYGEN SATURATION: 100 % | WEIGHT: 174.6 LBS | DIASTOLIC BLOOD PRESSURE: 72 MMHG | TEMPERATURE: 97.6 F

## 2023-10-13 DIAGNOSIS — K59.03 DRUG-INDUCED CONSTIPATION: ICD-10-CM

## 2023-10-13 DIAGNOSIS — R42 DIZZINESS: ICD-10-CM

## 2023-10-13 PROCEDURE — 99212 OFFICE O/P EST SF 10 MIN: CPT | Performed by: NURSE PRACTITIONER

## 2023-10-13 PROCEDURE — 3078F DIAST BP <80 MM HG: CPT | Performed by: NURSE PRACTITIONER

## 2023-10-13 PROCEDURE — 99213 OFFICE O/P EST LOW 20 MIN: CPT | Performed by: NURSE PRACTITIONER

## 2023-10-13 PROCEDURE — 3074F SYST BP LT 130 MM HG: CPT | Performed by: NURSE PRACTITIONER

## 2023-10-13 RX ORDER — SENNA AND DOCUSATE SODIUM 50; 8.6 MG/1; MG/1
1 TABLET, FILM COATED ORAL DAILY
Qty: 30 TABLET | Refills: 11 | Status: SHIPPED | OUTPATIENT
Start: 2023-10-13

## 2023-10-13 RX ORDER — MECLIZINE HCL 12.5 MG/1
12.5-25 TABLET ORAL 3 TIMES DAILY PRN
Qty: 60 TABLET | Refills: 0 | Status: SHIPPED | OUTPATIENT
Start: 2023-10-13

## 2023-10-13 NOTE — ASSESSMENT & PLAN NOTE
Acute  Patient has baseline vertigo- but states that these symptoms are more severe than her baseline. She only gets Vertigo in the cold weather. She was last treated with meclizine and it worked well.   Orthostatic blood pressures  Laying 120/74  Sitting 116/70  Standing 114/72  [place] testing - mildly positive  Will have patient start back on meclizine 12.5mg 1-2 pills 3 times daily as needed   Increase water intake   Perform Epley manuevers at home

## 2023-10-13 NOTE — PATIENT INSTRUCTIONS
Please watch Epley Manuevers on youtube and perform at home on bed, this will make it worse at first then gradually get better.

## 2023-10-13 NOTE — PROGRESS NOTES
Chief Complaint   Patient presents with    Dizziness       Subjective:     HPI:   Anali Tellez is a 63 y.o. female here to discuss the evaluation and management of:      Problem   Dizziness    Patient has been having symptoms for the last two weeks. States it is worse when she closes her eyes, or changes position. States if she gets up too quickly she feels like she is going to black out and sees darkening in her vision. Does not report any upper respiratory symptoms. Has been drinking lots of water, does not drink sodas. Has been having ringing more often in her ears lately with this episodes. She has baseline ringing in her ears but it is more intense with the dizzy episodes. She does state when episodes happen that she becomes really hot.      Drug-Induced Constipation    Patient continues to have issues with constipationsecondary to her narcotic use. She has been using Linzess but is still having trouble.          ROS  See HPI     Allergies   Allergen Reactions    Latex Hives and Rash     Pt states hives and rash       Current medicines (including changes today)  Current Outpatient Medications   Medication Sig Dispense Refill    meclizine (ANTIVERT) 12.5 MG Tab Take 1-2 Tablets by mouth 3 times a day as needed for Dizziness. 60 Tablet 0    sennosides-docusate sodium (SENOKOT-S) 8.6-50 MG tablet Take 1 Tablet by mouth every day. 30 Tablet 11    triamterene-hydrochlorothiazide (MAXZIDE 75-50) 75-50 MG Tab TAKE 1 TABLET ORALLY EVERY MORNING 30 Tablet 3    methocarbamol (ROBAXIN) 500 MG Tab Take 1 Tablet by mouth 2 times a day. Take one pill in morning and 2 pills at bedtime 90 Tablet 1    HYDROcodone/acetaminophen (NORCO)  MG Tab Take 1 tablet by mouth twice a dayas needed 60 Tablet 0    gabapentin (NEURONTIN) 600 MG tablet Take 0.5 Tablets by mouth every evening. 60 Tablet 0    linaCLOtide (LINZESS) 145 MCG Cap Take 1 capsule by mouth every day. 30 Capsule 5    Fluticasone-Umeclidin-Vilant (TRELEGY  ELLIPTA) 200-62.5-25 MCG/ACT AEROSOL POWDER, BREATH ACTIVATED Inhale 1 Inhalation every day. 60 Each 5    albuterol (PROVENTIL) 2.5mg/3ml Nebu Soln solution for nebulization Take 3 mL by nebulization every four hours as needed for Shortness of Breath. 75 mL 5    HYDROcodone/acetaminophen (NORCO)  MG Tab Take 1 Tablet by mouth 2 times a day. 60 Tablet 0    HYDROcodone/acetaminophen (NORCO)  MG Tab 1 tablet by mouth twice a day 60 Tablet 0    albuterol 108 (90 Base) MCG/ACT Aero Soln inhalation aerosol Inhale 2 Puffs by mouth every 6 hours as needed for Shortness of Breath. 8.5 g 11    HYDROcodone/acetaminophen (NORCO)  MG Tab Take 1 tablet by mouth 2 times a day. 60 Tablet 0    mometasone (ELOCON) 0.1 % Cream Apply thin layer to rash on hands twice daily as needed until resolved 45 g 2    Prenatal MV-Min-Fe Fum-FA-DHA (PRENATAL 1 PO) Take  by mouth.       No current facility-administered medications for this visit.       Social History     Tobacco Use    Smoking status: Every Day     Current packs/day: 0.50     Average packs/day: 0.5 packs/day for 32.0 years (16.0 ttl pk-yrs)     Types: Cigarettes    Smokeless tobacco: Never   Vaping Use    Vaping Use: Never used   Substance Use Topics    Alcohol use: Not Currently     Alcohol/week: 3.6 oz     Types: 6 Cans of beer per week     Comment: history of alcoholism, stopped drinking heavily at 28    Drug use: No       Patient Active Problem List    Diagnosis Date Noted    Dizziness 10/13/2023    Medication reaction 06/26/2023    Acute cough 01/17/2023    Vertigo 07/21/2022    Seborrheic keratoses 04/21/2021    Drug-induced constipation 12/23/2020    Vitamin D deficiency 06/11/2019    Eczema 03/09/2018    Obesity (BMI 30-39.9) 02/08/2018    Lumbar facet arthropathy 08/11/2017    Pre-diabetes 04/05/2017    Alcoholism in remission (HCC) 03/23/2017    Tobacco use 03/23/2017    Chronic bilateral low back pain with left-sided sciatica 03/23/2017    Chronic  "obstructive pulmonary disease with acute exacerbation (HCC) 11/06/2016    HTN (hypertension) 11/06/2016       Family History   Problem Relation Age of Onset    Diabetes Mother     Hypertension Mother     Heart Disease Mother         78    Heart Disease Father         82    Diabetes Sister     Hypertension Sister     No Known Problems Brother     Hypertension Sister     Cancer Neg Hx           Objective:     /72 (BP Location: Left arm, Patient Position: Sitting, BP Cuff Size: Adult)   Pulse 66   Temp 36.4 °C (97.6 °F) (Temporal)   Resp 17   Ht 1.651 m (5' 5\")   Wt 79.2 kg (174 lb 9.6 oz)   SpO2 100%  Body mass index is 29.05 kg/m².    Physical Exam:  Physical Exam  Vitals reviewed.   Constitutional:       General: She is awake.      Appearance: Normal appearance. She is well-developed.   HENT:      Head: Normocephalic.      Right Ear: Tympanic membrane and ear canal normal.      Left Ear: Tympanic membrane and ear canal normal.   Eyes:      Conjunctiva/sclera: Conjunctivae normal.   Cardiovascular:      Rate and Rhythm: Normal rate and regular rhythm.      Heart sounds: Normal heart sounds.   Pulmonary:      Effort: Pulmonary effort is normal. No respiratory distress.      Breath sounds: Normal breath sounds. No wheezing.   Abdominal:      General: Bowel sounds are normal.      Tenderness: There is no abdominal tenderness.   Musculoskeletal:      Cervical back: Neck supple.   Skin:     General: Skin is warm and dry.   Neurological:      Mental Status: She is alert and oriented to person, place, and time.   Psychiatric:         Mood and Affect: Mood normal.         Behavior: Behavior normal. Behavior is cooperative.              Assessment and Plan:     The following treatment plan was discussed:    Problem List Items Addressed This Visit       Drug-induced constipation     Ongoing-   Will trial Senokot to add to her Linzess  Increase water  Utilize fiber in fruit such as pears, beets and kiwi.  Good bowel " sounds on exam           Relevant Medications    sennosides-docusate sodium (SENOKOT-S) 8.6-50 MG tablet    Dizziness     Acute  Patient has baseline vertigo- but states that these symptoms are more severe than her baseline. She only gets Vertigo in the cold weather. She was last treated with meclizine and it worked well.   Orthostatic blood pressures  Laying 120/74  Sitting 116/70  Standing 114/72  Rohmberg testing - mildly positive  Will have patient start back on meclizine 12.5mg 1-2 pills 3 times daily as needed   Increase water intake   Perform Epley manuevers at home          Relevant Medications    meclizine (ANTIVERT) 12.5 MG Tab    Other Relevant Orders    Referral to Audiology       Any change or worsening of signs or symptoms, patient encouraged to follow-up or report to emergency room for further evaluation. Patient verbalizes understanding and agrees.    Follow-Up:Follow up as needed       PLEASE NOTE: This dictation was created using voice recognition software. I have made every reasonable attempt to correct obvious errors, but I expect that there are errors of grammar and possibly content that I did not discover before finalizing the note.

## 2023-10-13 NOTE — ASSESSMENT & PLAN NOTE
Ongoing-   Will trial Senokot to add to her Linzess  Increase water  Utilize fiber in fruit such as pears, beets and kiwi.  Good bowel sounds on exam

## 2023-10-23 ENCOUNTER — PHARMACY VISIT (OUTPATIENT)
Dept: PHARMACY | Facility: MEDICAL CENTER | Age: 63
End: 2023-10-23
Payer: COMMERCIAL

## 2023-10-23 PROCEDURE — RXMED WILLOW AMBULATORY MEDICATION CHARGE: Performed by: FAMILY MEDICINE

## 2023-10-26 DIAGNOSIS — J44.1 CHRONIC OBSTRUCTIVE PULMONARY DISEASE WITH ACUTE EXACERBATION (HCC): ICD-10-CM

## 2023-10-26 PROCEDURE — RXMED WILLOW AMBULATORY MEDICATION CHARGE: Performed by: INTERNAL MEDICINE

## 2023-10-26 RX ORDER — FLUTICASONE FUROATE, UMECLIDINIUM BROMIDE AND VILANTEROL TRIFENATATE 200; 62.5; 25 UG/1; UG/1; UG/1
1 POWDER RESPIRATORY (INHALATION) DAILY
Qty: 60 EACH | Refills: 5 | Status: SHIPPED | OUTPATIENT
Start: 2023-10-26 | End: 2024-02-26 | Stop reason: SDUPTHER

## 2023-10-30 ENCOUNTER — PHARMACY VISIT (OUTPATIENT)
Dept: PHARMACY | Facility: MEDICAL CENTER | Age: 63
End: 2023-10-30
Payer: COMMERCIAL

## 2023-11-03 ENCOUNTER — PHARMACY VISIT (OUTPATIENT)
Dept: PHARMACY | Facility: MEDICAL CENTER | Age: 63
End: 2023-11-03
Payer: COMMERCIAL

## 2023-11-03 PROCEDURE — RXMED WILLOW AMBULATORY MEDICATION CHARGE: Performed by: INTERNAL MEDICINE

## 2023-12-04 ENCOUNTER — PHARMACY VISIT (OUTPATIENT)
Dept: PHARMACY | Facility: MEDICAL CENTER | Age: 63
End: 2023-12-04
Payer: COMMERCIAL

## 2023-12-04 PROCEDURE — RXMED WILLOW AMBULATORY MEDICATION CHARGE: Performed by: INTERNAL MEDICINE

## 2023-12-06 PROCEDURE — RXMED WILLOW AMBULATORY MEDICATION CHARGE: Performed by: INTERNAL MEDICINE

## 2023-12-07 ENCOUNTER — PHARMACY VISIT (OUTPATIENT)
Dept: PHARMACY | Facility: MEDICAL CENTER | Age: 63
End: 2023-12-07
Payer: COMMERCIAL

## 2023-12-21 ENCOUNTER — PHARMACY VISIT (OUTPATIENT)
Dept: PHARMACY | Facility: MEDICAL CENTER | Age: 63
End: 2023-12-21
Payer: COMMERCIAL

## 2023-12-21 PROCEDURE — RXMED WILLOW AMBULATORY MEDICATION CHARGE: Performed by: INTERNAL MEDICINE

## 2023-12-28 ENCOUNTER — OFFICE VISIT (OUTPATIENT)
Dept: MEDICAL GROUP | Facility: MEDICAL CENTER | Age: 63
End: 2023-12-28
Payer: MEDICARE

## 2023-12-28 ENCOUNTER — HOSPITAL ENCOUNTER (OUTPATIENT)
Dept: LAB | Facility: MEDICAL CENTER | Age: 63
End: 2023-12-28
Payer: MEDICARE

## 2023-12-28 VITALS
TEMPERATURE: 96.8 F | DIASTOLIC BLOOD PRESSURE: 60 MMHG | WEIGHT: 179.3 LBS | BODY MASS INDEX: 29.87 KG/M2 | SYSTOLIC BLOOD PRESSURE: 120 MMHG | HEIGHT: 65 IN | HEART RATE: 68 BPM | OXYGEN SATURATION: 98 % | RESPIRATION RATE: 16 BRPM

## 2023-12-28 DIAGNOSIS — N95.0 POST-MENOPAUSAL BLEEDING: ICD-10-CM

## 2023-12-28 DIAGNOSIS — R42 DIZZINESS: ICD-10-CM

## 2023-12-28 DIAGNOSIS — N63.11 MASS OF UPPER OUTER QUADRANT OF RIGHT BREAST: ICD-10-CM

## 2023-12-28 PROBLEM — E66.9 OBESITY (BMI 30-39.9): Status: RESOLVED | Noted: 2018-02-08 | Resolved: 2023-12-28

## 2023-12-28 PROBLEM — N63.10 BREAST MASS, RIGHT: Status: ACTIVE | Noted: 2023-12-28

## 2023-12-28 LAB
ERYTHROCYTE [DISTWIDTH] IN BLOOD BY AUTOMATED COUNT: 42.1 FL (ref 35.9–50)
HCT VFR BLD AUTO: 40 % (ref 37–47)
HGB BLD-MCNC: 13.4 G/DL (ref 12–16)
MCH RBC QN AUTO: 30.6 PG (ref 27–33)
MCHC RBC AUTO-ENTMCNC: 33.5 G/DL (ref 32.2–35.5)
MCV RBC AUTO: 91.3 FL (ref 81.4–97.8)
PLATELET # BLD AUTO: 225 K/UL (ref 164–446)
PMV BLD AUTO: 10.1 FL (ref 9–12.9)
RBC # BLD AUTO: 4.38 M/UL (ref 4.2–5.4)
WBC # BLD AUTO: 9 K/UL (ref 4.8–10.8)

## 2023-12-28 PROCEDURE — 85027 COMPLETE CBC AUTOMATED: CPT

## 2023-12-28 PROCEDURE — 99213 OFFICE O/P EST LOW 20 MIN: CPT

## 2023-12-28 PROCEDURE — 36415 COLL VENOUS BLD VENIPUNCTURE: CPT

## 2023-12-28 PROCEDURE — 3074F SYST BP LT 130 MM HG: CPT

## 2023-12-28 PROCEDURE — 99214 OFFICE O/P EST MOD 30 MIN: CPT

## 2023-12-28 PROCEDURE — 3078F DIAST BP <80 MM HG: CPT

## 2023-12-28 NOTE — PROGRESS NOTES
"Subjective:     CC: Vaginal bleeding    HPI:   Anali presents today with    Problem   Post-Menopausal Bleeding    Patient reports that for 7 months she has been experiencing vaginal bleeding that's occasional only occurring monthly. She reports it only occurs with  bowel movements. It last occurred yesterday. She reports that she wipes the back and there is no blood and then she wipes the front and theres blood. The blood is pink, watery and mucus like. She denies any pelvic pain or abdominal pain. She does report some abdominal bloating.  She denies any abnormal odors. She is not douching or sexually active. She denies any family history of breast, colon, and endometrial cancer      Breast Mass, Right    Patient first noticed the breast mass about 3 weeks ago. She reports that its not painful. She denies any abnormal nipple discharge or breast skin changes.      Dizziness    Patient reports that her dizziness is ongoing. She tried the Meclizine that was previously prescribed however the do not help. She denies any syncopal episodes.      Obesity (Bmi 30-39.9) (Resolved)            ROS:  - CONSTITUTIONAL: Denies weight loss, fever and chills.  - HEENT: Denies changes in vision and hearing.  - RESPIRATORY: Denies SOB and cough.  - CV: Denies palpitations and CP.  - GI: Denies abdominal pain, nausea, vomiting and diarrhea.  Positive for constipation.  - : Denies dysuria and urinary frequency.  - MSK: Denies myalgia and joint pain.  - SKIN: Denies rash and pruritus.  - NEUROLOGICAL: Denies headache and syncope.  - PSYCHIATRIC: Denies recent changes in mood. Denies anxiety and depression.  - BREAST-positive for right breast mass    Please see HPI for additional ROS.     Objective:     Exam:  /60 (BP Location: Left arm, Patient Position: Sitting, BP Cuff Size: Adult)   Pulse 68   Temp 36 °C (96.8 °F) (Temporal)   Resp 16   Ht 1.651 m (5' 5\")   Wt 81.3 kg (179 lb 4.8 oz)   LMP 05/19/2010   SpO2 98%   BMI " 29.84 kg/m²  Body mass index is 29.84 kg/m².    Physical Exam:  Constitutional: Alert, no distress, well-groomed.  Skin: Warm, dry, good turgor, no rashes in visible areas.  Eye: Equal, round and reactive, conjunctiva clear, lids normal.  ENMT: Lips without lesions, good dentition, moist mucous membranes.  Neck: Trachea midline, no masses, no thyromegaly.  Respiratory: Unlabored respiratory effort, no cough.  Abd: soft, non tender, non distended, normal BS  MSK: Normal gait, moves all extremities.  Neuro: Grossly non-focal.   Psych: Alert and oriented x3, normal affect and mood.  Breast: Right breast with an almond shaped mobile nonfluctuant mass.  Skin with no signs of dimpling or erythema.    Labs: Reviewed    Assessment & Plan:     63 y.o. female with the following -     1. Post-menopausal bleeding  Ongoing.  Etiology unsure prognosis uncertain.  Patient reports that she was initially suspicious that it was possibly rectal given her constipation however she now feels that it is definitely vaginal as she wipes from the back and then wipes from the front and it only occurs when she wipes from the front.  She has no family history of breast, cervical, or endometrial cancer.  A fit test was ordered however it was never completed.  We discussed completing a trams abdominal and transvaginal ultrasound.  - CBC WITHOUT DIFFERENTIAL; Future  - US-PELVIC COMPLETE (TRANSABDOMINAL/TRANSVAGINAL) (COMBO); Future    2. Dizziness  Ongoing concern.  She tried the meclizine that was previously prescribed to her with no improvement of her symptoms.  She denies any syncopal episodes.  We will follow-up with a CBC to see if her dizziness is related to her bleeding.  ER precautions given.  - CBC WITHOUT DIFFERENTIAL; Future    3. Mass of upper outer quadrant of right breast  Acute, unsure etiology.  She reports that she noticed the mass about 3 weeks ago.  She denies any pain, nipple discharge, or skin changes.  Physical exam reveals  a almond shaped mobile mass with no erythema.  We discussed following up with a breast ultrasound.  She has never completed a mammogram.  - US-BREAST LIMITED-RIGHT; Future  - MA-DIAGNOSTIC MAMMO BILAT W/TOMOSYNTHESIS W/CAD; Future         Return if symptoms worsen or fail to improve.    Please note that this dictation was created using voice recognition software. I have made every reasonable attempt to correct obvious errors, but I expect that there are errors of grammar and possibly content that I did not discover before finalizing the note.

## 2024-01-02 PROCEDURE — RXMED WILLOW AMBULATORY MEDICATION CHARGE: Performed by: INTERNAL MEDICINE

## 2024-01-03 ENCOUNTER — PHARMACY VISIT (OUTPATIENT)
Dept: PHARMACY | Facility: MEDICAL CENTER | Age: 64
End: 2024-01-03
Payer: COMMERCIAL

## 2024-01-19 ENCOUNTER — HOSPITAL ENCOUNTER (OUTPATIENT)
Dept: RADIOLOGY | Facility: MEDICAL CENTER | Age: 64
End: 2024-01-19
Payer: MEDICARE

## 2024-01-19 DIAGNOSIS — N95.0 POST-MENOPAUSAL BLEEDING: ICD-10-CM

## 2024-01-19 PROCEDURE — 76830 TRANSVAGINAL US NON-OB: CPT

## 2024-01-22 DIAGNOSIS — K59.03 DRUG-INDUCED CONSTIPATION: ICD-10-CM

## 2024-01-22 NOTE — TELEPHONE ENCOUNTER
Received request via: Pharmacy    Was the patient seen in the last year in this department? Yes    Does the patient have an active prescription (recently filled or refills available) for medication(s) requested? No    Pharmacy Name: [facility]    Does the patient have group home Plus and need 100 day supply (blood pressure, diabetes and cholesterol meds only)? Patient does not have SCP

## 2024-01-23 DIAGNOSIS — N95.0 POST-MENOPAUSAL BLEEDING: ICD-10-CM

## 2024-01-23 DIAGNOSIS — N85.00 ENDOMETRIAL HYPERPLASIA: ICD-10-CM

## 2024-01-23 PROCEDURE — RXMED WILLOW AMBULATORY MEDICATION CHARGE: Performed by: INTERNAL MEDICINE

## 2024-01-23 RX ORDER — LINACLOTIDE 145 UG/1
1 CAPSULE, GELATIN COATED ORAL DAILY
Qty: 30 CAPSULE | Refills: 5 | Status: SHIPPED | OUTPATIENT
Start: 2024-01-23

## 2024-01-25 ENCOUNTER — PHARMACY VISIT (OUTPATIENT)
Dept: PHARMACY | Facility: MEDICAL CENTER | Age: 64
End: 2024-01-25
Payer: COMMERCIAL

## 2024-02-01 DIAGNOSIS — I10 ESSENTIAL HYPERTENSION: ICD-10-CM

## 2024-02-01 PROCEDURE — RXMED WILLOW AMBULATORY MEDICATION CHARGE: Performed by: INTERNAL MEDICINE

## 2024-02-01 RX ORDER — TRIAMTERENE AND HYDROCHLOROTHIAZIDE 75; 50 MG/1; MG/1
TABLET ORAL
Qty: 30 TABLET | Refills: 5 | Status: SHIPPED | OUTPATIENT
Start: 2024-02-01

## 2024-02-01 NOTE — TELEPHONE ENCOUNTER
Received request via: Patient    Was the patient seen in the last year in this department? Yes    Does the patient have an active prescription (recently filled or refills available) for medication(s) requested? No    Pharmacy Name: [facility]     Does the patient have longterm Plus and need 100 day supply (blood pressure, diabetes and cholesterol meds only)? Patient does not have SCP

## 2024-02-05 ENCOUNTER — PHARMACY VISIT (OUTPATIENT)
Dept: PHARMACY | Facility: MEDICAL CENTER | Age: 64
End: 2024-02-05
Payer: COMMERCIAL

## 2024-02-26 ENCOUNTER — PHARMACY VISIT (OUTPATIENT)
Dept: PHARMACY | Facility: MEDICAL CENTER | Age: 64
End: 2024-02-26
Payer: COMMERCIAL

## 2024-02-26 DIAGNOSIS — J44.1 CHRONIC OBSTRUCTIVE PULMONARY DISEASE WITH ACUTE EXACERBATION (HCC): ICD-10-CM

## 2024-02-26 PROCEDURE — RXMED WILLOW AMBULATORY MEDICATION CHARGE: Performed by: INTERNAL MEDICINE

## 2024-02-26 NOTE — TELEPHONE ENCOUNTER
Received request via: Pharmacy    Was the patient seen in the last year in this department? Yes    Does the patient have an active prescription (recently filled or refills available) for medication(s) requested? No    Pharmacy Name: [facility]    Does the patient have alf Plus and need 100 day supply (blood pressure, diabetes and cholesterol meds only)? Patient does not have SCP

## 2024-02-27 RX ORDER — FLUTICASONE FUROATE, UMECLIDINIUM BROMIDE AND VILANTEROL TRIFENATATE 200; 62.5; 25 UG/1; UG/1; UG/1
1 POWDER RESPIRATORY (INHALATION) DAILY
Qty: 60 EACH | Refills: 5 | Status: SHIPPED | OUTPATIENT
Start: 2024-02-27

## 2024-03-10 PROCEDURE — RXMED WILLOW AMBULATORY MEDICATION CHARGE: Performed by: INTERNAL MEDICINE

## 2024-03-12 ENCOUNTER — PHARMACY VISIT (OUTPATIENT)
Dept: PHARMACY | Facility: MEDICAL CENTER | Age: 64
End: 2024-03-12
Payer: COMMERCIAL

## 2024-03-27 ENCOUNTER — PHARMACY VISIT (OUTPATIENT)
Dept: PHARMACY | Facility: MEDICAL CENTER | Age: 64
End: 2024-03-27
Payer: COMMERCIAL

## 2024-03-27 PROCEDURE — RXMED WILLOW AMBULATORY MEDICATION CHARGE: Performed by: INTERNAL MEDICINE

## 2024-04-02 PROCEDURE — RXMED WILLOW AMBULATORY MEDICATION CHARGE: Performed by: INTERNAL MEDICINE

## 2024-04-03 ENCOUNTER — PHARMACY VISIT (OUTPATIENT)
Dept: PHARMACY | Facility: MEDICAL CENTER | Age: 64
End: 2024-04-03
Payer: COMMERCIAL

## 2024-04-21 PROCEDURE — RXMED WILLOW AMBULATORY MEDICATION CHARGE: Performed by: INTERNAL MEDICINE

## 2024-04-23 ENCOUNTER — PHARMACY VISIT (OUTPATIENT)
Dept: PHARMACY | Facility: MEDICAL CENTER | Age: 64
End: 2024-04-23
Payer: COMMERCIAL

## 2024-04-28 PROCEDURE — RXMED WILLOW AMBULATORY MEDICATION CHARGE: Performed by: INTERNAL MEDICINE

## 2024-05-03 ENCOUNTER — PHARMACY VISIT (OUTPATIENT)
Dept: PHARMACY | Facility: MEDICAL CENTER | Age: 64
End: 2024-05-03
Payer: COMMERCIAL

## 2024-05-17 PROCEDURE — RXMED WILLOW AMBULATORY MEDICATION CHARGE: Performed by: INTERNAL MEDICINE

## 2024-05-20 ENCOUNTER — PHARMACY VISIT (OUTPATIENT)
Dept: PHARMACY | Facility: MEDICAL CENTER | Age: 64
End: 2024-05-20
Payer: COMMERCIAL

## 2024-05-27 PROCEDURE — RXMED WILLOW AMBULATORY MEDICATION CHARGE: Performed by: INTERNAL MEDICINE

## 2024-05-29 ENCOUNTER — PHARMACY VISIT (OUTPATIENT)
Dept: PHARMACY | Facility: MEDICAL CENTER | Age: 64
End: 2024-05-29
Payer: COMMERCIAL

## 2024-05-29 PROCEDURE — RXMED WILLOW AMBULATORY MEDICATION CHARGE: Performed by: INTERNAL MEDICINE

## 2024-06-26 ENCOUNTER — PHARMACY VISIT (OUTPATIENT)
Dept: PHARMACY | Facility: MEDICAL CENTER | Age: 64
End: 2024-06-26
Payer: COMMERCIAL

## 2024-06-26 PROCEDURE — RXMED WILLOW AMBULATORY MEDICATION CHARGE: Performed by: INTERNAL MEDICINE

## 2024-07-15 ENCOUNTER — HOSPITAL ENCOUNTER (OUTPATIENT)
Facility: MEDICAL CENTER | Age: 64
End: 2024-07-15
Attending: PHYSICIAN ASSISTANT
Payer: MEDICARE

## 2024-07-15 ENCOUNTER — OFFICE VISIT (OUTPATIENT)
Dept: URGENT CARE | Facility: CLINIC | Age: 64
End: 2024-07-15
Payer: MEDICARE

## 2024-07-15 VITALS
TEMPERATURE: 97.4 F | OXYGEN SATURATION: 97 % | RESPIRATION RATE: 14 BRPM | HEIGHT: 62 IN | SYSTOLIC BLOOD PRESSURE: 112 MMHG | BODY MASS INDEX: 32.83 KG/M2 | DIASTOLIC BLOOD PRESSURE: 64 MMHG | WEIGHT: 178.4 LBS | HEART RATE: 89 BPM

## 2024-07-15 DIAGNOSIS — R30.0 DYSURIA: ICD-10-CM

## 2024-07-15 DIAGNOSIS — N30.01 ACUTE CYSTITIS WITH HEMATURIA: ICD-10-CM

## 2024-07-15 LAB
APPEARANCE UR: NORMAL
BILIRUB UR STRIP-MCNC: NEGATIVE MG/DL
COLOR UR AUTO: NORMAL
GLUCOSE UR STRIP.AUTO-MCNC: NEGATIVE MG/DL
KETONES UR STRIP.AUTO-MCNC: NEGATIVE MG/DL
LEUKOCYTE ESTERASE UR QL STRIP.AUTO: NORMAL
NITRITE UR QL STRIP.AUTO: POSITIVE
PH UR STRIP.AUTO: 7 [PH] (ref 5–8)
PROT UR QL STRIP: NORMAL MG/DL
RBC UR QL AUTO: NORMAL
SP GR UR STRIP.AUTO: 1.02
UROBILINOGEN UR STRIP-MCNC: NORMAL MG/DL

## 2024-07-15 PROCEDURE — 87086 URINE CULTURE/COLONY COUNT: CPT

## 2024-07-15 PROCEDURE — 99213 OFFICE O/P EST LOW 20 MIN: CPT | Performed by: PHYSICIAN ASSISTANT

## 2024-07-15 PROCEDURE — 87186 SC STD MICRODIL/AGAR DIL: CPT

## 2024-07-15 PROCEDURE — 3074F SYST BP LT 130 MM HG: CPT | Performed by: PHYSICIAN ASSISTANT

## 2024-07-15 PROCEDURE — 87077 CULTURE AEROBIC IDENTIFY: CPT

## 2024-07-15 PROCEDURE — 81002 URINALYSIS NONAUTO W/O SCOPE: CPT | Performed by: PHYSICIAN ASSISTANT

## 2024-07-15 PROCEDURE — 3078F DIAST BP <80 MM HG: CPT | Performed by: PHYSICIAN ASSISTANT

## 2024-07-15 RX ORDER — CEFDINIR 300 MG/1
300 CAPSULE ORAL 2 TIMES DAILY
Qty: 14 CAPSULE | Refills: 0 | Status: SHIPPED | OUTPATIENT
Start: 2024-07-15 | End: 2024-07-22

## 2024-07-15 ASSESSMENT — FIBROSIS 4 INDEX: FIB4 SCORE: 1.39

## 2024-07-15 ASSESSMENT — ENCOUNTER SYMPTOMS
DIARRHEA: 0
COUGH: 0
CONSTIPATION: 0
MYALGIAS: 0
EYE PAIN: 0
FEVER: 0
CHILLS: 0
VOMITING: 0
SORE THROAT: 0
ABDOMINAL PAIN: 0
HEADACHES: 0
NAUSEA: 0
SHORTNESS OF BREATH: 0

## 2024-07-17 LAB
BACTERIA UR CULT: ABNORMAL
BACTERIA UR CULT: ABNORMAL
SIGNIFICANT IND 70042: ABNORMAL
SITE SITE: ABNORMAL
SOURCE SOURCE: ABNORMAL

## 2024-07-20 DIAGNOSIS — I10 ESSENTIAL HYPERTENSION: ICD-10-CM

## 2024-07-20 DIAGNOSIS — K59.03 DRUG-INDUCED CONSTIPATION: ICD-10-CM

## 2024-07-20 PROCEDURE — RXMED WILLOW AMBULATORY MEDICATION CHARGE: Performed by: INTERNAL MEDICINE

## 2024-07-23 ENCOUNTER — PHARMACY VISIT (OUTPATIENT)
Dept: PHARMACY | Facility: MEDICAL CENTER | Age: 64
End: 2024-07-23
Payer: COMMERCIAL

## 2024-07-23 RX ORDER — LINACLOTIDE 145 UG/1
1 CAPSULE, GELATIN COATED ORAL DAILY
Qty: 30 CAPSULE | Refills: 5 | Status: SHIPPED | OUTPATIENT
Start: 2024-07-23

## 2024-07-23 RX ORDER — TRIAMTERENE AND HYDROCHLOROTHIAZIDE 75; 50 MG/1; MG/1
TABLET ORAL
Qty: 30 TABLET | Refills: 5 | Status: SHIPPED | OUTPATIENT
Start: 2024-07-23

## 2024-07-25 ENCOUNTER — HOSPITAL ENCOUNTER (OUTPATIENT)
Facility: MEDICAL CENTER | Age: 64
End: 2024-07-25
Payer: MEDICARE

## 2024-07-25 ENCOUNTER — TELEPHONE (OUTPATIENT)
Dept: INTERNAL MEDICINE | Facility: OTHER | Age: 64
End: 2024-07-25
Payer: MEDICARE

## 2024-07-25 ENCOUNTER — TELEPHONE (OUTPATIENT)
Dept: URGENT CARE | Facility: CLINIC | Age: 64
End: 2024-07-25
Payer: MEDICARE

## 2024-07-25 ENCOUNTER — OFFICE VISIT (OUTPATIENT)
Dept: URGENT CARE | Facility: CLINIC | Age: 64
End: 2024-07-25
Payer: MEDICARE

## 2024-07-25 VITALS
WEIGHT: 177.3 LBS | DIASTOLIC BLOOD PRESSURE: 60 MMHG | RESPIRATION RATE: 12 BRPM | SYSTOLIC BLOOD PRESSURE: 110 MMHG | HEART RATE: 76 BPM | BODY MASS INDEX: 29.54 KG/M2 | TEMPERATURE: 96.6 F | HEIGHT: 65 IN | OXYGEN SATURATION: 99 %

## 2024-07-25 DIAGNOSIS — N30.01 ACUTE CYSTITIS WITH HEMATURIA: ICD-10-CM

## 2024-07-25 DIAGNOSIS — N89.8 VAGINAL ITCHING: ICD-10-CM

## 2024-07-25 LAB
APPEARANCE UR: NORMAL
BILIRUB UR STRIP-MCNC: NEGATIVE MG/DL
COLOR UR AUTO: NORMAL
GLUCOSE UR STRIP.AUTO-MCNC: NEGATIVE MG/DL
KETONES UR STRIP.AUTO-MCNC: 15 MG/DL
LEUKOCYTE ESTERASE UR QL STRIP.AUTO: NORMAL
NITRITE UR QL STRIP.AUTO: POSITIVE
PH UR STRIP.AUTO: 6 [PH] (ref 5–8)
PROT UR QL STRIP: NEGATIVE MG/DL
RBC UR QL AUTO: NORMAL
SP GR UR STRIP.AUTO: 1.02
UROBILINOGEN UR STRIP-MCNC: 2 MG/DL

## 2024-07-25 PROCEDURE — 87660 TRICHOMONAS VAGIN DIR PROBE: CPT

## 2024-07-25 PROCEDURE — 87186 SC STD MICRODIL/AGAR DIL: CPT

## 2024-07-25 PROCEDURE — 87480 CANDIDA DNA DIR PROBE: CPT

## 2024-07-25 PROCEDURE — 3078F DIAST BP <80 MM HG: CPT

## 2024-07-25 PROCEDURE — 3074F SYST BP LT 130 MM HG: CPT

## 2024-07-25 PROCEDURE — 81002 URINALYSIS NONAUTO W/O SCOPE: CPT

## 2024-07-25 PROCEDURE — 99214 OFFICE O/P EST MOD 30 MIN: CPT

## 2024-07-25 PROCEDURE — 87086 URINE CULTURE/COLONY COUNT: CPT

## 2024-07-25 PROCEDURE — 87077 CULTURE AEROBIC IDENTIFY: CPT

## 2024-07-25 PROCEDURE — 87510 GARDNER VAG DNA DIR PROBE: CPT

## 2024-07-25 RX ORDER — FLUCONAZOLE 150 MG/1
TABLET ORAL
Qty: 2 TABLET | Refills: 0 | Status: SHIPPED | OUTPATIENT
Start: 2024-07-25

## 2024-07-25 RX ORDER — NITROFURANTOIN 25; 75 MG/1; MG/1
100 CAPSULE ORAL 2 TIMES DAILY
Qty: 10 CAPSULE | Refills: 0 | Status: SHIPPED | OUTPATIENT
Start: 2024-07-25 | End: 2024-07-30

## 2024-07-25 ASSESSMENT — FIBROSIS 4 INDEX: FIB4 SCORE: 1.42

## 2024-07-26 LAB
CANDIDA DNA VAG QL PROBE+SIG AMP: NEGATIVE
G VAGINALIS DNA VAG QL PROBE+SIG AMP: NEGATIVE
T VAGINALIS DNA VAG QL PROBE+SIG AMP: NEGATIVE

## 2024-07-29 PROCEDURE — RXMED WILLOW AMBULATORY MEDICATION CHARGE: Performed by: INTERNAL MEDICINE

## 2024-07-31 ENCOUNTER — PHARMACY VISIT (OUTPATIENT)
Dept: PHARMACY | Facility: MEDICAL CENTER | Age: 64
End: 2024-07-31
Payer: COMMERCIAL

## 2024-08-21 DIAGNOSIS — J44.1 CHRONIC OBSTRUCTIVE PULMONARY DISEASE WITH ACUTE EXACERBATION (HCC): ICD-10-CM

## 2024-08-21 PROCEDURE — RXMED WILLOW AMBULATORY MEDICATION CHARGE: Performed by: INTERNAL MEDICINE

## 2024-08-21 RX ORDER — FLUTICASONE FUROATE, UMECLIDINIUM BROMIDE AND VILANTEROL TRIFENATATE 200; 62.5; 25 UG/1; UG/1; UG/1
1 POWDER RESPIRATORY (INHALATION) DAILY
Qty: 60 EACH | Refills: 5 | Status: SHIPPED | OUTPATIENT
Start: 2024-08-21

## 2024-08-23 ENCOUNTER — PHARMACY VISIT (OUTPATIENT)
Dept: PHARMACY | Facility: MEDICAL CENTER | Age: 64
End: 2024-08-23
Payer: COMMERCIAL

## 2024-10-22 ENCOUNTER — APPOINTMENT (OUTPATIENT)
Dept: MEDICAL GROUP | Facility: MEDICAL CENTER | Age: 64
End: 2024-10-22
Payer: COMMERCIAL

## 2024-10-23 PROCEDURE — RXMED WILLOW AMBULATORY MEDICATION CHARGE: Performed by: INTERNAL MEDICINE

## 2024-10-30 ENCOUNTER — PHARMACY VISIT (OUTPATIENT)
Dept: PHARMACY | Facility: MEDICAL CENTER | Age: 64
End: 2024-10-30
Payer: COMMERCIAL

## 2024-11-15 PROCEDURE — RXMED WILLOW AMBULATORY MEDICATION CHARGE: Performed by: INTERNAL MEDICINE

## 2024-11-18 ENCOUNTER — PHARMACY VISIT (OUTPATIENT)
Dept: PHARMACY | Facility: MEDICAL CENTER | Age: 64
End: 2024-11-18
Payer: COMMERCIAL

## 2024-12-04 ENCOUNTER — APPOINTMENT (OUTPATIENT)
Dept: URGENT CARE | Facility: CLINIC | Age: 64
End: 2024-12-04
Payer: COMMERCIAL

## 2025-01-10 ENCOUNTER — PATIENT MESSAGE (OUTPATIENT)
Dept: HEALTH INFORMATION MANAGEMENT | Facility: OTHER | Age: 65
End: 2025-01-10

## 2025-01-29 DIAGNOSIS — I10 ESSENTIAL HYPERTENSION: ICD-10-CM

## 2025-01-29 DIAGNOSIS — J44.1 CHRONIC OBSTRUCTIVE PULMONARY DISEASE WITH ACUTE EXACERBATION (HCC): ICD-10-CM

## 2025-01-29 DIAGNOSIS — K59.03 DRUG-INDUCED CONSTIPATION: ICD-10-CM

## 2025-01-29 RX ORDER — TRIAMTERENE AND HYDROCHLOROTHIAZIDE 75; 50 MG/1; MG/1
TABLET ORAL
Qty: 30 TABLET | Refills: 5 | Status: CANCELLED | OUTPATIENT
Start: 2025-01-29

## 2025-01-30 ENCOUNTER — PHARMACY VISIT (OUTPATIENT)
Dept: PHARMACY | Facility: MEDICAL CENTER | Age: 65
End: 2025-01-30
Payer: COMMERCIAL

## 2025-01-30 DIAGNOSIS — I10 ESSENTIAL HYPERTENSION: ICD-10-CM

## 2025-01-30 PROCEDURE — RXMED WILLOW AMBULATORY MEDICATION CHARGE: Performed by: INTERNAL MEDICINE

## 2025-01-30 RX ORDER — FLUTICASONE FUROATE, UMECLIDINIUM BROMIDE AND VILANTEROL TRIFENATATE 200; 62.5; 25 UG/1; UG/1; UG/1
1 POWDER RESPIRATORY (INHALATION) DAILY
Qty: 60 EACH | Refills: 0 | OUTPATIENT
Start: 2025-01-30

## 2025-01-30 RX ORDER — TRIAMTERENE AND HYDROCHLOROTHIAZIDE 75; 50 MG/1; MG/1
TABLET ORAL
Qty: 30 TABLET | Refills: 0 | OUTPATIENT
Start: 2025-01-30

## 2025-01-30 RX ORDER — LINACLOTIDE 145 UG/1
1 CAPSULE, GELATIN COATED ORAL DAILY
Qty: 30 CAPSULE | Refills: 5 | OUTPATIENT
Start: 2025-01-30

## 2025-02-06 ENCOUNTER — OFFICE VISIT (OUTPATIENT)
Dept: MEDICAL GROUP | Facility: MEDICAL CENTER | Age: 65
End: 2025-02-06
Attending: INTERNAL MEDICINE
Payer: MEDICARE

## 2025-02-06 VITALS
HEART RATE: 79 BPM | TEMPERATURE: 97.1 F | WEIGHT: 173.5 LBS | DIASTOLIC BLOOD PRESSURE: 64 MMHG | BODY MASS INDEX: 27.88 KG/M2 | SYSTOLIC BLOOD PRESSURE: 118 MMHG | RESPIRATION RATE: 16 BRPM | HEIGHT: 66 IN | OXYGEN SATURATION: 99 %

## 2025-02-06 DIAGNOSIS — G89.29 CHRONIC BILATERAL LOW BACK PAIN WITH LEFT-SIDED SCIATICA: ICD-10-CM

## 2025-02-06 DIAGNOSIS — M54.42 CHRONIC BILATERAL LOW BACK PAIN WITH LEFT-SIDED SCIATICA: ICD-10-CM

## 2025-02-06 DIAGNOSIS — F10.21 ALCOHOLISM IN REMISSION (HCC): ICD-10-CM

## 2025-02-06 DIAGNOSIS — I10 ESSENTIAL HYPERTENSION: ICD-10-CM

## 2025-02-06 DIAGNOSIS — Z13.220 SCREENING, LIPID: ICD-10-CM

## 2025-02-06 DIAGNOSIS — Z78.0 POSTMENOPAUSAL: ICD-10-CM

## 2025-02-06 DIAGNOSIS — K59.03 DRUG-INDUCED CONSTIPATION: ICD-10-CM

## 2025-02-06 DIAGNOSIS — N63.11 MASS OF UPPER OUTER QUADRANT OF RIGHT BREAST: ICD-10-CM

## 2025-02-06 DIAGNOSIS — J44.1 CHRONIC OBSTRUCTIVE PULMONARY DISEASE WITH ACUTE EXACERBATION (HCC): ICD-10-CM

## 2025-02-06 DIAGNOSIS — H25.9 SENILE CATARACT OF RIGHT EYE, UNSPECIFIED AGE-RELATED CATARACT TYPE: ICD-10-CM

## 2025-02-06 DIAGNOSIS — Z72.0 TOBACCO USE: ICD-10-CM

## 2025-02-06 DIAGNOSIS — R73.03 PRE-DIABETES: ICD-10-CM

## 2025-02-06 DIAGNOSIS — J44.9 COPD WITHOUT EXACERBATION (HCC): ICD-10-CM

## 2025-02-06 DIAGNOSIS — N95.0 POST-MENOPAUSAL BLEEDING: ICD-10-CM

## 2025-02-06 PROBLEM — R42 DIZZINESS: Status: RESOLVED | Noted: 2023-10-13 | Resolved: 2025-02-06

## 2025-02-06 PROBLEM — T50.905A MEDICATION REACTION: Status: RESOLVED | Noted: 2023-06-26 | Resolved: 2025-02-06

## 2025-02-06 PROBLEM — R05.1 ACUTE COUGH: Status: RESOLVED | Noted: 2023-01-17 | Resolved: 2025-02-06

## 2025-02-06 LAB
HBA1C MFR BLD: 5.9 % (ref ?–5.8)
POCT INT CON NEG: NEGATIVE
POCT INT CON POS: POSITIVE

## 2025-02-06 PROCEDURE — 3074F SYST BP LT 130 MM HG: CPT | Performed by: INTERNAL MEDICINE

## 2025-02-06 PROCEDURE — 3078F DIAST BP <80 MM HG: CPT | Performed by: INTERNAL MEDICINE

## 2025-02-06 PROCEDURE — 99214 OFFICE O/P EST MOD 30 MIN: CPT | Performed by: INTERNAL MEDICINE

## 2025-02-06 PROCEDURE — 83036 HEMOGLOBIN GLYCOSYLATED A1C: CPT | Performed by: INTERNAL MEDICINE

## 2025-02-06 PROCEDURE — 99213 OFFICE O/P EST LOW 20 MIN: CPT | Performed by: INTERNAL MEDICINE

## 2025-02-06 RX ORDER — GABAPENTIN 400 MG/1
400 CAPSULE ORAL 3 TIMES DAILY
COMMUNITY
Start: 2025-01-13

## 2025-02-06 RX ORDER — MOMETASONE FUROATE 1 MG/G
CREAM TOPICAL
Qty: 45 G | Refills: 2 | Status: CANCELLED | OUTPATIENT
Start: 2025-02-06

## 2025-02-06 RX ORDER — HYDROCODONE BITARTRATE AND ACETAMINOPHEN 5; 325 MG/1; MG/1
1 TABLET ORAL 4 TIMES DAILY PRN
COMMUNITY
Start: 2025-01-07

## 2025-02-06 RX ORDER — METHOCARBAMOL 500 MG/1
TABLET, FILM COATED ORAL
Qty: 90 TABLET | Refills: 3 | Status: SHIPPED | OUTPATIENT
Start: 2025-02-06 | End: 2025-02-07

## 2025-02-06 RX ORDER — METHOCARBAMOL 500 MG/1
500 TABLET, FILM COATED ORAL 2 TIMES DAILY
Qty: 90 TABLET | Refills: 3 | Status: SHIPPED | OUTPATIENT
Start: 2025-02-06 | End: 2025-02-06

## 2025-02-06 RX ORDER — FLUTICASONE FUROATE, UMECLIDINIUM BROMIDE AND VILANTEROL TRIFENATATE 200; 62.5; 25 UG/1; UG/1; UG/1
1 POWDER RESPIRATORY (INHALATION) DAILY
Qty: 60 EACH | Refills: 5 | Status: SHIPPED | OUTPATIENT
Start: 2025-02-06

## 2025-02-06 RX ORDER — ALBUTEROL SULFATE 0.83 MG/ML
2.5 SOLUTION RESPIRATORY (INHALATION) EVERY 4 HOURS PRN
Qty: 75 ML | Refills: 5 | Status: SHIPPED | OUTPATIENT
Start: 2025-02-06

## 2025-02-06 RX ORDER — ALBUTEROL SULFATE 90 UG/1
2 INHALANT RESPIRATORY (INHALATION) EVERY 6 HOURS PRN
Qty: 8.5 G | Refills: 2 | Status: SHIPPED | OUTPATIENT
Start: 2025-02-06

## 2025-02-06 RX ORDER — TRIAMTERENE AND HYDROCHLOROTHIAZIDE 75; 50 MG/1; MG/1
TABLET ORAL
Qty: 100 TABLET | Refills: 3 | Status: SHIPPED | OUTPATIENT
Start: 2025-02-06

## 2025-02-06 RX ORDER — LINACLOTIDE 290 UG/1
1 CAPSULE, GELATIN COATED ORAL DAILY
Qty: 100 CAPSULE | Refills: 3 | Status: SHIPPED | OUTPATIENT
Start: 2025-02-06

## 2025-02-06 ASSESSMENT — FIBROSIS 4 INDEX: FIB4 SCORE: 1.42

## 2025-02-06 NOTE — ASSESSMENT & PLAN NOTE
Pt Urine HCG neg. Result did not transfer over In system, results receipt confirmed with two pt identifiers,    She continues to smoke and is not ready to quit at this time.

## 2025-02-06 NOTE — ASSESSMENT & PLAN NOTE
We reviewed her complaint of postmenopausal vaginal bleeding from 1 year ago.  She states that she did follow-up with an ultrasound and some sort of an exam with Dr. Stephens but she is not sure what was done.  She states there was something found and they wanted her to come back but she never did.  We do not have these records for review.

## 2025-02-06 NOTE — ASSESSMENT & PLAN NOTE
She reports being diagnosed with a cataract many years ago at [facility].  She reports progressively worsening right blurry vision as she has wanted to see a surgeon for cataract removal but has never done so.  She would like a referral today.

## 2025-02-06 NOTE — ASSESSMENT & PLAN NOTE
She first noted her breast mass about a year ago on the right side.  She was seen by one of my colleagues who ordered a right breast ultrasound and mammogram but the patient never completed it.  She states that the mass is still present.  She thinks it may have gotten smaller but she is not sure.  We discussed in detail that this could represent an undiagnosed breast cancer given that she has always refused mammography and has never had a mammogram.  We also discussed that in her age range this is a common diagnosis and I would recommend getting imaging at this time.  She is very reluctant to get a mammogram stating that she is worried about the mass getting squished and spreading despite reassurance that this does not happen.  However, she is amenable to getting an ultrasound of the area.

## 2025-02-06 NOTE — PROGRESS NOTES
Subjective:   Anali Tellez is a 64 y.o. female here today for yearly follow up    Chronic bilateral low back pain with left-sided sciatica  She continues to follow with her pain management doctor monthly.  She is currently on hydrocodone 5-3 25 and she gets 120/month.  Lately she reports that her left-sided low back pain has been worse.  She denies any obvious trigger.  She has been taking her methocarbamol and notes that this provides some relief temporarily but the pain returns.  She is also using gabapentin.  She will be following up with her pain management provider in another 2 weeks.  She has had what sounds like facet nerve ablations in the past that have been very helpful but this was quite a while ago.      Age-related cataract of right eye  She reports being diagnosed with a cataract many years ago at [facility].  She reports progressively worsening right blurry vision as she has wanted to see a surgeon for cataract removal but has never done so.  She would like a referral today.    Breast mass, right  She first noted her breast mass about a year ago on the right side.  She was seen by one of my colleagues who ordered a right breast ultrasound and mammogram but the patient never completed it.  She states that the mass is still present.  She thinks it may have gotten smaller but she is not sure.  We discussed in detail that this could represent an undiagnosed breast cancer given that she has always refused mammography and has never had a mammogram.  We also discussed that in her age range this is a common diagnosis and I would recommend getting imaging at this time.  She is very reluctant to get a mammogram stating that she is worried about the mass getting squished and spreading despite reassurance that this does not happen.  However, she is amenable to getting an ultrasound of the area.    Tobacco use  She continues to smoke and is not ready to quit at this time.    Pre-diabetes  She is  currently off of medication for glycemic control.  A1c in clinic today was 5.9.    Post-menopausal bleeding  We reviewed her complaint of postmenopausal vaginal bleeding from 1 year ago.  She states that she did follow-up with an ultrasound and some sort of an exam with Dr. Stephens but she is not sure what was done.  She states there was something found and they wanted her to come back but she never did.  We do not have these records for review.    HTN (hypertension)  She continues on her Maxide.  Blood pressure remains well-controlled on this regimen.    Drug-induced constipation  Currently taking Linzess 145 mg daily but reports she still only has 1 bowel movement about every 3 days and she feels uncomfortable and constipated.  She is not using any MiraLAX or other OTC treatment at this time.  She is interested in trying an increased dose.  She continues on her hydrocodone regularly.       Current medicines (including changes today)  Current Outpatient Medications   Medication Sig Dispense Refill    HYDROcodone-acetaminophen (NORCO) 5-325 MG Tab per tablet Take 1 Tablet by mouth 4 times a day as needed.      triamterene-hydrochlorothiazide (MAXZIDE 75-50) 75-50 MG Tab TAKE 1 TABLET BY MOUTH EVERY MORNING 100 Tablet 3    fluticasone-umeclidinium-vilanterol (TRELEGY ELLIPTA) 200-62.5-25 mcg/act inhaler Inhale 1 puff every day. 60 Each 5    methocarbamol (ROBAXIN) 500 MG Tab Take 1 Tablet by mouth 2 times a day. Take one pill in morning and 2 pills at bedtime 90 Tablet 3    gabapentin (NEURONTIN) 400 MG Cap Take 400 mg by mouth 3 times a day.      albuterol 108 (90 Base) MCG/ACT Aero Soln inhalation aerosol Inhale 2 Puffs by mouth every 6 hours as needed for Shortness of Breath. 8.5 g 2    albuterol (PROVENTIL) 2.5mg/3ml Nebu Soln solution for nebulization Take 3 mL by nebulization every four hours as needed for Shortness of Breath. 75 mL 5    linaCLOtide (LINZESS) 290 MCG Cap Take 1 Capsule by mouth every day. 100  Capsule 3    sennosides-docusate sodium (SENOKOT-S) 8.6-50 MG tablet Take 1 Tablet by mouth every day. 30 Tablet 11    mometasone (ELOCON) 0.1 % Cream Apply thin layer to rash on hands twice daily as needed until resolved 45 g 2    Prenatal MV-Min-Fe Fum-FA-DHA (PRENATAL 1 PO) Take  by mouth.       No current facility-administered medications for this visit.     She  has a past medical history of Chronic obstructive pulmonary disease (HCC), Hypertension, Low back pain, Obesity (BMI 30-39.9) (02/08/2018), and Rash (07/26/2017).         Objective:     Vitals:    02/06/25 0736   BP: 118/64   Pulse: 79   Resp: 16   Temp: 36.2 °C (97.1 °F)   SpO2: 99%     Body mass index is 28.43 kg/m².   Physical Exam:  Constitutional: Alert, no distress.  Skin: Warm, dry, good turgor, no rashes in visible areas.  Eye: Equal, round and reactive, conjunctiva clear, lids normal.  ENMT: Lips without lesions, poor dentition, oropharynx clear, TM's clear bilaterally  Neck: Trachea midline, no masses, no thyromegaly. No cervical or supraclavicular lymphadenopathy  Respiratory: Unlabored respiratory effort, lungs clear to auscultation, no wheezes, no ronchi.  Cardiovascular: Regular rate and rhythm, no murmurs appreciated, no lower extremity edema  Abdomen: Soft, non-tender, no masses, no hepatosplenomegaly.  Psych: Alert and oriented x3, normal affect and mood.  Breast: Area of increased firmness over the upper part of the right breast approximately 2 cm in diameter    Results and Imaging:   Lab Results   Component Value Date/Time    HBA1C 5.9 (A) 02/06/2025 08:00 AM          Assessment and Plan:   The following treatment plan was discussed    1. Pre-diabetes  Stable off of medication, will continue to monitor.  - POCT Hemoglobin A1C    2. Essential hypertension  Stable, well controlled with current meds which were refilled today.   - triamterene-hydrochlorothiazide (MAXZIDE 75-50) 75-50 MG Tab; TAKE 1 TABLET BY MOUTH EVERY MORNING  Dispense:  100 Tablet; Refill: 3  - Comp Metabolic Panel; Future    3. Post-menopausal bleeding  She reports this has resolved and that she got appropriate testing from Dr. Stephens however we do not have these records.  Discussed obtaining them today to see what follow-up she is needing.    4. Chronic obstructive pulmonary disease (HCC)  Stable, well controlled with current meds which were refilled today.   - fluticasone-umeclidinium-vilanterol (TRELEGY ELLIPTA) 200-62.5-25 mcg/act inhaler; Inhale 1 puff every day.  Dispense: 60 Each; Refill: 5  - albuterol (PROVENTIL) 2.5mg/3ml Nebu Soln solution for nebulization; Take 3 mL by nebulization every four hours as needed for Shortness of Breath.  Dispense: 75 mL; Refill: 5  - albuterol 108 (90 Base) MCG/ACT Aero Soln inhalation aerosol; Inhale 2 Puffs by mouth every 6 hours as needed for Shortness of Breath.  Dispense: 8.5 g; Refill: 2    5. Chronic bilateral low back pain with left-sided sciatica  With acute exacerbation, we discussed following up with pain management to consider additional procedures at her next visit.  -Continue follow-up with pain management for pain meds/procedures  - methocarbamol (ROBAXIN) 500 MG Tab; Take 1 Tablet by mouth 2 times a day. Take one pill in morning and 2 pills at bedtime  Dispense: 90 Tablet; Refill: 3    6. Postmenopausal  - VITAMIN D,25 HYDROXY (DEFICIENCY); Future  - DS-BONE DENSITY STUDY (DEXA); Future    7. Screening, lipid  - Lipid Profile; Future    8. Tobacco use  She is not interested in quitting this time.  Will continue to encourage cessation.    9. Drug-induced constipation  Uncontrolled.  We discussed uptitrating Linzess but that she can also use over-the-counter MiraLAX if needed.  - linaCLOtide (LINZESS) 290 MCG Cap; Take 1 Capsule by mouth every day.  Dispense: 100 Capsule; Refill: 3    10. Mass of upper outer quadrant of right breast  We discussed that in the absence of imaging, we could certainly be missing a treatable cancer.   Although she is not agreeable to mammogram, she is agreeable to getting an ultrasound of the area which I have ordered.  - US-BREAST LIMITED-RIGHT; Future    11. Senile cataract of right eye, unspecified age-related cataract type  She would like to have cataract surgically removed, referral to ophthalmology placed.  - Referral to Ophthalmology    12. Alcoholism in remission (HCC)  Chronic, stable. Continue with current defined treatment plan: continue to monitor. Follow-up at least annually.    Followup: Return in about 3 months (around 5/6/2025).

## 2025-02-06 NOTE — ASSESSMENT & PLAN NOTE
She continues to follow with her pain management doctor monthly.  She is currently on hydrocodone 5-3 25 and she gets 120/month.  Lately she reports that her left-sided low back pain has been worse.  She denies any obvious trigger.  She has been taking her methocarbamol and notes that this provides some relief temporarily but the pain returns.  She is also using gabapentin.  She will be following up with her pain management provider in another 2 weeks.  She has had what sounds like facet nerve ablations in the past that have been very helpful but this was quite a while ago.

## 2025-02-06 NOTE — ASSESSMENT & PLAN NOTE
Currently taking Linzess 145 mg daily but reports she still only has 1 bowel movement about every 3 days and she feels uncomfortable and constipated.  She is not using any MiraLAX or other OTC treatment at this time.  She is interested in trying an increased dose.  She continues on her hydrocodone regularly.

## 2025-02-07 RX ORDER — METHOCARBAMOL 500 MG/1
TABLET, FILM COATED ORAL
Qty: 90 TABLET | Refills: 3 | Status: SHIPPED | OUTPATIENT
Start: 2025-02-07

## 2025-02-12 ENCOUNTER — HOSPITAL ENCOUNTER (OUTPATIENT)
Dept: LAB | Facility: MEDICAL CENTER | Age: 65
End: 2025-02-12
Attending: INTERNAL MEDICINE
Payer: MEDICARE

## 2025-02-12 DIAGNOSIS — Z78.0 POSTMENOPAUSAL: ICD-10-CM

## 2025-02-12 DIAGNOSIS — Z13.220 SCREENING, LIPID: ICD-10-CM

## 2025-02-12 DIAGNOSIS — I10 ESSENTIAL HYPERTENSION: ICD-10-CM

## 2025-02-12 LAB
25(OH)D3 SERPL-MCNC: 40 NG/ML (ref 30–100)
ALBUMIN SERPL BCP-MCNC: 4.2 G/DL (ref 3.2–4.9)
ALBUMIN/GLOB SERPL: 1.2 G/DL
ALP SERPL-CCNC: 88 U/L (ref 30–99)
ALT SERPL-CCNC: 19 U/L (ref 2–50)
ANION GAP SERPL CALC-SCNC: 11 MMOL/L (ref 7–16)
AST SERPL-CCNC: 26 U/L (ref 12–45)
BILIRUB SERPL-MCNC: 0.4 MG/DL (ref 0.1–1.5)
BUN SERPL-MCNC: 17 MG/DL (ref 8–22)
CALCIUM ALBUM COR SERPL-MCNC: 10.1 MG/DL (ref 8.5–10.5)
CALCIUM SERPL-MCNC: 10.3 MG/DL (ref 8.5–10.5)
CHLORIDE SERPL-SCNC: 103 MMOL/L (ref 96–112)
CHOLEST SERPL-MCNC: 161 MG/DL (ref 100–199)
CO2 SERPL-SCNC: 26 MMOL/L (ref 20–33)
CREAT SERPL-MCNC: 1.07 MG/DL (ref 0.5–1.4)
GFR SERPLBLD CREATININE-BSD FMLA CKD-EPI: 57 ML/MIN/1.73 M 2
GLOBULIN SER CALC-MCNC: 3.4 G/DL (ref 1.9–3.5)
GLUCOSE SERPL-MCNC: 91 MG/DL (ref 65–99)
HDLC SERPL-MCNC: 69 MG/DL
LDLC SERPL CALC-MCNC: 78 MG/DL
POTASSIUM SERPL-SCNC: 3.7 MMOL/L (ref 3.6–5.5)
PROT SERPL-MCNC: 7.6 G/DL (ref 6–8.2)
SODIUM SERPL-SCNC: 140 MMOL/L (ref 135–145)
TRIGL SERPL-MCNC: 70 MG/DL (ref 0–149)

## 2025-02-12 PROCEDURE — 80053 COMPREHEN METABOLIC PANEL: CPT

## 2025-02-12 PROCEDURE — 80061 LIPID PANEL: CPT

## 2025-02-12 PROCEDURE — 82306 VITAMIN D 25 HYDROXY: CPT

## 2025-02-12 PROCEDURE — 36415 COLL VENOUS BLD VENIPUNCTURE: CPT

## 2025-02-12 NOTE — Clinical Note
REFERRAL APPROVAL NOTICE         Sent on February 12, 2025                   Anali Tellez  [address]  [address]  [facility] [address]                   Dear Ms. Tellez,    After a careful review of the medical information and benefit coverage, [facility] has processed your referral. See below for additional details.    If applicable, you must be actively enrolled with your insurance for coverage of the authorized service. If you have any questions regarding your coverage, please contact your insurance directly.    REFERRAL INFORMATION   Referral #:  [id number]  Referred-To Provider    Referred-By Provider:  Ophthalmology    Tata Gonsalez M.D.   [facility]      [address]  [facility] [address]  [phone] [address]  [facility] [address]  [phone]    Referral Start Date:  02/06/2025  Referral End Date:   02/06/2026             SCHEDULING  If you do not already have an appointment, please call [phone] to make an appointment.     MORE INFORMATION  If you do not already have a [facility] account, sign up at: [email or web address]  You can access your medical information, make appointments, see lab results, billing information, and more.  If you have questions regarding this referral, please contact  the [facility] Referrals department at:             [phone]. Monday - Friday 8:00AM - 5:00PM.     Sincerely,    [facility]

## 2025-02-13 ENCOUNTER — RESULTS FOLLOW-UP (OUTPATIENT)
Dept: MEDICAL GROUP | Facility: MEDICAL CENTER | Age: 65
End: 2025-02-13

## 2025-03-21 ENCOUNTER — APPOINTMENT (OUTPATIENT)
Dept: RADIOLOGY | Facility: MEDICAL CENTER | Age: 65
End: 2025-03-21
Attending: INTERNAL MEDICINE
Payer: MEDICARE

## 2025-05-08 ENCOUNTER — OFFICE VISIT (OUTPATIENT)
Dept: MEDICAL GROUP | Facility: MEDICAL CENTER | Age: 65
End: 2025-05-08
Attending: INTERNAL MEDICINE
Payer: MEDICARE

## 2025-05-08 VITALS
DIASTOLIC BLOOD PRESSURE: 66 MMHG | OXYGEN SATURATION: 97 % | TEMPERATURE: 97.4 F | HEART RATE: 67 BPM | WEIGHT: 174.6 LBS | SYSTOLIC BLOOD PRESSURE: 114 MMHG | HEIGHT: 67 IN | RESPIRATION RATE: 16 BRPM | BODY MASS INDEX: 27.4 KG/M2

## 2025-05-08 DIAGNOSIS — K59.03 DRUG-INDUCED CONSTIPATION: ICD-10-CM

## 2025-05-08 DIAGNOSIS — N63.11 MASS OF UPPER OUTER QUADRANT OF RIGHT BREAST: ICD-10-CM

## 2025-05-08 DIAGNOSIS — I10 ESSENTIAL HYPERTENSION: ICD-10-CM

## 2025-05-08 DIAGNOSIS — N18.31 CHRONIC KIDNEY DISEASE, STAGE 3A: ICD-10-CM

## 2025-05-08 DIAGNOSIS — J44.9 COPD WITHOUT EXACERBATION (HCC): ICD-10-CM

## 2025-05-08 DIAGNOSIS — H25.9 SENILE CATARACT OF RIGHT EYE, UNSPECIFIED AGE-RELATED CATARACT TYPE: ICD-10-CM

## 2025-05-08 PROCEDURE — RXMED WILLOW AMBULATORY MEDICATION CHARGE: Performed by: INTERNAL MEDICINE

## 2025-05-08 PROCEDURE — 3078F DIAST BP <80 MM HG: CPT | Performed by: INTERNAL MEDICINE

## 2025-05-08 PROCEDURE — 99214 OFFICE O/P EST MOD 30 MIN: CPT | Performed by: INTERNAL MEDICINE

## 2025-05-08 PROCEDURE — 99213 OFFICE O/P EST LOW 20 MIN: CPT | Performed by: INTERNAL MEDICINE

## 2025-05-08 PROCEDURE — 3074F SYST BP LT 130 MM HG: CPT | Performed by: INTERNAL MEDICINE

## 2025-05-08 RX ORDER — TRIAMTERENE AND HYDROCHLOROTHIAZIDE 75; 50 MG/1; MG/1
TABLET ORAL
Qty: 100 TABLET | Refills: 3 | Status: SHIPPED | OUTPATIENT
Start: 2025-05-08

## 2025-05-08 RX ORDER — LINACLOTIDE 290 UG/1
1 CAPSULE, GELATIN COATED ORAL DAILY
Qty: 100 CAPSULE | Refills: 3 | Status: SHIPPED | OUTPATIENT
Start: 2025-05-08

## 2025-05-08 RX ORDER — FLUTICASONE FUROATE, UMECLIDINIUM BROMIDE AND VILANTEROL TRIFENATATE 200; 62.5; 25 UG/1; UG/1; UG/1
1 POWDER RESPIRATORY (INHALATION) DAILY
Qty: 60 EACH | Refills: 5 | Status: SHIPPED | OUTPATIENT
Start: 2025-05-08

## 2025-05-08 ASSESSMENT — FIBROSIS 4 INDEX: FIB4 SCORE: 1.72

## 2025-05-09 NOTE — ASSESSMENT & PLAN NOTE
At her last visit, we uptitrated her Linzess.  She is now taking to 90 mg daily and she reports good bowel movements with this regimen.

## 2025-05-09 NOTE — ASSESSMENT & PLAN NOTE
She has still not obtained any follow-up imaging.  At her last visit we had ordered an ultrasound.  She feels like the area got smaller so she decided not to schedule however she is amenable to proceeding with imaging now.

## 2025-05-09 NOTE — PROGRESS NOTES
Subjective:   Anali Tellez is a 64 y.o. female here today for follow up multiple issues    Breast mass, right  She has still not obtained any follow-up imaging.  At her last visit we had ordered an ultrasound.  She feels like the area got smaller so she decided not to schedule however she is amenable to proceeding with imaging now.    Age-related cataract of right eye  She reports her vision is affected, things look cloudier.  She was referred to ophthalmology but location was too far for her to travel.  She is requesting new referral to a closer location.  She is interested in cataract removal surgery.    Drug-induced constipation  At her last visit, we uptitrated her Linzess.  She is now taking to 90 mg daily and she reports good bowel movements with this regimen.    Chronic kidney disease, stage 3a  GFR has been mildly reduced on her 2 most recent CMP's.  She is asymptomatic.       Current medicines (including changes today)  Current Outpatient Medications   Medication Sig Dispense Refill    fluticasone-umeclidinium-vilanterol (TRELEGY ELLIPTA) 200-62.5-25 MCG/ACT inhaler Inhale 1 puff every day. 60 Each 5    triamterene-hydrochlorothiazide (MAXZIDE 75-50) 75-50 MG Tab TAKE 1 TABLET BY MOUTH EVERY MORNING 100 Tablet 3    linaCLOtide (LINZESS) 290 MCG Cap Take 1 Capsule by mouth every day. 100 Capsule 3    methocarbamol (ROBAXIN) 500 MG Tab Take 1 tab Qam and 2 tabs QHS for muscle spasm/pain 90 Tablet 3    HYDROcodone-acetaminophen (NORCO) 5-325 MG Tab per tablet Take 1 Tablet by mouth 4 times a day as needed.      gabapentin (NEURONTIN) 400 MG Cap Take 400 mg by mouth 3 times a day.      albuterol 108 (90 Base) MCG/ACT Aero Soln inhalation aerosol Inhale 2 Puffs by mouth every 6 hours as needed for Shortness of Breath. 8.5 g 2    albuterol (PROVENTIL) 2.5mg/3ml Nebu Soln solution for nebulization Take 3 mL by nebulization every four hours as needed for Shortness of Breath. 75 mL 5    sennosides-docusate  sodium (SENOKOT-S) 8.6-50 MG tablet Take 1 Tablet by mouth every day. 30 Tablet 11    mometasone (ELOCON) 0.1 % Cream Apply thin layer to rash on hands twice daily as needed until resolved 45 g 2    Prenatal MV-Min-Fe Fum-FA-DHA (PRENATAL 1 PO) Take  by mouth.       No current facility-administered medications for this visit.     She  has a past medical history of Chronic obstructive pulmonary disease (HCC), Hypertension, Low back pain, Obesity (BMI 30-39.9) (02/08/2018), and Rash (07/26/2017).         Objective:     Vitals:    05/08/25 1400   BP: 114/66   Pulse: 67   Resp: 16   Temp: 36.3 °C (97.4 °F)   SpO2: 97%     Body mass index is 27.76 kg/m².   Physical Exam:  Constitutional: Alert, no distress.  Skin: Warm, dry, good turgor, no rashes in visible areas.  Eye: Equal, round and reactive, conjunctiva clear, lids normal.  Psych: Alert and oriented x3, normal affect and mood.        Assessment and Plan:   The following treatment plan was discussed    1. Senile cataract of right eye, unspecified age-related cataract type  Referral placed ophthalmology to [facility] which is closer for patient so that she can be seen and get cataract surgery.  - Referral to Ophthalmology    2. Chronic kidney disease, stage 3a  Stable, we will continue to monitor with yearly labs, avoid nephrotoxic meds    3. COPD without exacerbation (HCC)  Stable, well controlled with current meds which were refilled today.   - fluticasone-umeclidinium-vilanterol (TRELEGY ELLIPTA) 200-62.5-25 MCG/ACT inhaler; Inhale 1 puff every day.  Dispense: 60 Each; Refill: 5    4. Essential hypertension  Stable, well controlled with current meds which were refilled today.   - triamterene-hydrochlorothiazide (MAXZIDE 75-50) 75-50 MG Tab; TAKE 1 TABLET BY MOUTH EVERY MORNING  Dispense: 100 Tablet; Refill: 3    5. Drug-induced constipation  Stable, well controlled with current meds which were refilled today.   - linaCLOtide (LINZESS) 290 MCG Cap; Take 1  Capsule by mouth every day.  Dispense: 100 Capsule; Refill: 3    6. Mass of upper outer quadrant of right breast  Encouraged her to obtain the ordered imaging despite her perception that the mass is smaller.  Order was reprinted and handed to patient today and instructions for scheduling were explained.        Followup: Return in about 5 months (around 10/8/2025).

## 2025-05-09 NOTE — ASSESSMENT & PLAN NOTE
Negative         She reports her vision is affected, things look cloudier.  She was referred to ophthalmology but location was too far for her to travel.  She is requesting new referral to a closer location.  She is interested in cataract removal surgery.

## 2025-05-12 ENCOUNTER — PHARMACY VISIT (OUTPATIENT)
Dept: PHARMACY | Facility: MEDICAL CENTER | Age: 65
End: 2025-05-12
Payer: COMMERCIAL

## 2025-05-13 PROCEDURE — RXMED WILLOW AMBULATORY MEDICATION CHARGE: Performed by: INTERNAL MEDICINE

## 2025-05-21 ENCOUNTER — PHARMACY VISIT (OUTPATIENT)
Dept: PHARMACY | Facility: MEDICAL CENTER | Age: 65
End: 2025-05-21
Payer: COMMERCIAL

## 2025-06-03 ENCOUNTER — HOSPITAL ENCOUNTER (OUTPATIENT)
Dept: RADIOLOGY | Facility: MEDICAL CENTER | Age: 65
End: 2025-06-03
Attending: INTERNAL MEDICINE
Payer: MEDICARE

## 2025-06-03 DIAGNOSIS — N63.11 MASS OF UPPER OUTER QUADRANT OF RIGHT BREAST: ICD-10-CM

## 2025-06-03 PROBLEM — D17.1 LIPOMA OF BREAST: Status: ACTIVE | Noted: 2023-12-28

## 2025-06-03 PROCEDURE — 76642 ULTRASOUND BREAST LIMITED: CPT | Mod: RT

## 2025-08-11 ENCOUNTER — OFFICE VISIT (OUTPATIENT)
Dept: URGENT CARE | Facility: CLINIC | Age: 65
End: 2025-08-11
Payer: MEDICARE

## 2025-08-11 ENCOUNTER — HOSPITAL ENCOUNTER (OUTPATIENT)
Facility: MEDICAL CENTER | Age: 65
End: 2025-08-11
Attending: PHYSICIAN ASSISTANT
Payer: MEDICARE

## 2025-08-11 VITALS
HEIGHT: 66 IN | HEART RATE: 74 BPM | OXYGEN SATURATION: 99 % | RESPIRATION RATE: 14 BRPM | BODY MASS INDEX: 27.97 KG/M2 | DIASTOLIC BLOOD PRESSURE: 70 MMHG | WEIGHT: 174 LBS | SYSTOLIC BLOOD PRESSURE: 116 MMHG | TEMPERATURE: 97 F

## 2025-08-11 DIAGNOSIS — R39.9 UTI SYMPTOMS: Primary | ICD-10-CM

## 2025-08-11 DIAGNOSIS — N30.01 ACUTE CYSTITIS WITH HEMATURIA: ICD-10-CM

## 2025-08-11 LAB
APPEARANCE UR: CLEAR
BILIRUB UR STRIP-MCNC: NEGATIVE MG/DL
COLOR UR AUTO: NORMAL
GLUCOSE UR STRIP.AUTO-MCNC: NEGATIVE MG/DL
KETONES UR STRIP.AUTO-MCNC: NEGATIVE MG/DL
LEUKOCYTE ESTERASE UR QL STRIP.AUTO: NORMAL
NITRITE UR QL STRIP.AUTO: POSITIVE
PH UR STRIP.AUTO: 6.5 [PH] (ref 5–8)
PROT UR QL STRIP: NORMAL MG/DL
RBC UR QL AUTO: NORMAL
SP GR UR STRIP.AUTO: 1.02
UROBILINOGEN UR STRIP-MCNC: 1 MG/DL

## 2025-08-11 PROCEDURE — 87086 URINE CULTURE/COLONY COUNT: CPT

## 2025-08-11 PROCEDURE — 3078F DIAST BP <80 MM HG: CPT | Performed by: PHYSICIAN ASSISTANT

## 2025-08-11 PROCEDURE — 87077 CULTURE AEROBIC IDENTIFY: CPT

## 2025-08-11 PROCEDURE — 81002 URINALYSIS NONAUTO W/O SCOPE: CPT | Performed by: PHYSICIAN ASSISTANT

## 2025-08-11 PROCEDURE — 99214 OFFICE O/P EST MOD 30 MIN: CPT | Performed by: PHYSICIAN ASSISTANT

## 2025-08-11 PROCEDURE — 87186 SC STD MICRODIL/AGAR DIL: CPT

## 2025-08-11 PROCEDURE — 3074F SYST BP LT 130 MM HG: CPT | Performed by: PHYSICIAN ASSISTANT

## 2025-08-11 RX ORDER — NITROFURANTOIN 25; 75 MG/1; MG/1
100 CAPSULE ORAL EVERY 12 HOURS
Qty: 10 CAPSULE | Refills: 0 | Status: SHIPPED | OUTPATIENT
Start: 2025-08-11 | End: 2025-08-16

## 2025-08-11 ASSESSMENT — ENCOUNTER SYMPTOMS
RESPIRATORY NEGATIVE: 1
CARDIOVASCULAR NEGATIVE: 1
FEVER: 0
SWEATS: 0
DIARRHEA: 0
CHILLS: 0
NAUSEA: 0
FLANK PAIN: 0
ABDOMINAL PAIN: 0
VOMITING: 0

## 2025-08-11 ASSESSMENT — FIBROSIS 4 INDEX: FIB4 SCORE: 1.75

## 2025-08-17 DIAGNOSIS — G89.29 CHRONIC BILATERAL LOW BACK PAIN WITH LEFT-SIDED SCIATICA: ICD-10-CM

## 2025-08-17 DIAGNOSIS — M54.42 CHRONIC BILATERAL LOW BACK PAIN WITH LEFT-SIDED SCIATICA: ICD-10-CM

## 2025-08-17 PROCEDURE — RXMED WILLOW AMBULATORY MEDICATION CHARGE: Performed by: INTERNAL MEDICINE

## 2025-08-19 RX ORDER — METHOCARBAMOL 500 MG/1
TABLET, FILM COATED ORAL
Qty: 90 TABLET | Refills: 3 | Status: SHIPPED | OUTPATIENT
Start: 2025-08-19

## 2025-08-22 ENCOUNTER — PHARMACY VISIT (OUTPATIENT)
Dept: PHARMACY | Facility: MEDICAL CENTER | Age: 65
End: 2025-08-22
Payer: COMMERCIAL